# Patient Record
Sex: FEMALE | Race: WHITE | Employment: OTHER | URBAN - METROPOLITAN AREA
[De-identification: names, ages, dates, MRNs, and addresses within clinical notes are randomized per-mention and may not be internally consistent; named-entity substitution may affect disease eponyms.]

---

## 2017-01-01 ENCOUNTER — HOSPITAL ENCOUNTER (OUTPATIENT)
Dept: MRI IMAGING | Age: 67
Discharge: HOME | End: 2017-08-03
Attending: NEUROLOGICAL SURGERY
Payer: MEDICARE

## 2017-01-01 ENCOUNTER — HOSPITAL ENCOUNTER (OUTPATIENT)
Dept: CT IMAGING | Age: 67
Discharge: HOME | End: 2017-07-18
Attending: NEUROLOGICAL SURGERY

## 2017-01-01 ENCOUNTER — OFFICE VISIT (OUTPATIENT)
Dept: FAMILY MEDICINE CLINIC | Age: 67
End: 2017-01-01

## 2017-01-01 ENCOUNTER — APPOINTMENT (OUTPATIENT)
Dept: CT IMAGING | Age: 67
End: 2017-01-01
Attending: NURSE PRACTITIONER
Payer: MEDICARE

## 2017-01-01 ENCOUNTER — TELEPHONE (OUTPATIENT)
Dept: FAMILY MEDICINE CLINIC | Age: 67
End: 2017-01-01

## 2017-01-01 ENCOUNTER — HOSPITAL ENCOUNTER (EMERGENCY)
Age: 67
Discharge: HOME | End: 2017-08-19
Attending: EMERGENCY MEDICINE | Admitting: EMERGENCY MEDICINE
Payer: MEDICARE

## 2017-01-01 ENCOUNTER — APPOINTMENT (OUTPATIENT)
Dept: GENERAL RADIOLOGY | Age: 67
End: 2017-01-01
Attending: NURSE PRACTITIONER
Payer: MEDICARE

## 2017-01-01 VITALS
HEART RATE: 69 BPM | DIASTOLIC BLOOD PRESSURE: 62 MMHG | OXYGEN SATURATION: 94 % | BODY MASS INDEX: 39.85 KG/M2 | WEIGHT: 203 LBS | HEIGHT: 60 IN | RESPIRATION RATE: 16 BRPM | SYSTOLIC BLOOD PRESSURE: 130 MMHG | TEMPERATURE: 97.1 F

## 2017-01-01 VITALS
WEIGHT: 200 LBS | OXYGEN SATURATION: 97 % | DIASTOLIC BLOOD PRESSURE: 71 MMHG | SYSTOLIC BLOOD PRESSURE: 139 MMHG | RESPIRATION RATE: 17 BRPM | TEMPERATURE: 98.4 F | HEART RATE: 59 BPM | BODY MASS INDEX: 39.27 KG/M2 | HEIGHT: 60 IN

## 2017-01-01 VITALS
WEIGHT: 208.6 LBS | RESPIRATION RATE: 16 BRPM | OXYGEN SATURATION: 95 % | DIASTOLIC BLOOD PRESSURE: 72 MMHG | HEIGHT: 62 IN | BODY MASS INDEX: 38.39 KG/M2 | TEMPERATURE: 98 F | HEART RATE: 56 BPM | SYSTOLIC BLOOD PRESSURE: 136 MMHG

## 2017-01-01 DIAGNOSIS — F02.80 EARLY ONSET ALZHEIMER'S DEMENTIA WITHOUT BEHAVIORAL DISTURBANCE (HCC): ICD-10-CM

## 2017-01-01 DIAGNOSIS — Z79.4 TYPE 2 DIABETES MELLITUS WITH HYPERGLYCEMIA, WITH LONG-TERM CURRENT USE OF INSULIN (HCC): Primary | ICD-10-CM

## 2017-01-01 DIAGNOSIS — I10 ESSENTIAL HYPERTENSION WITH GOAL BLOOD PRESSURE LESS THAN 140/90: ICD-10-CM

## 2017-01-01 DIAGNOSIS — E11.65 HYPERGLYCEMIA DUE TO TYPE 2 DIABETES MELLITUS (HCC): ICD-10-CM

## 2017-01-01 DIAGNOSIS — Z12.11 COLON CANCER SCREENING: ICD-10-CM

## 2017-01-01 DIAGNOSIS — I72.9 ANEURYSM OF UNSPECIFIED SITE (HCC): ICD-10-CM

## 2017-01-01 DIAGNOSIS — E08.42 DIABETIC POLYNEUROPATHY ASSOCIATED WITH DIABETES MELLITUS DUE TO UNDERLYING CONDITION (HCC): ICD-10-CM
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
## 2017-01-01 DIAGNOSIS — Z11.59 NEED FOR HEPATITIS C SCREENING TEST: ICD-10-CM

## 2017-01-01 DIAGNOSIS — F33.1 MODERATE EPISODE OF RECURRENT MAJOR DEPRESSIVE DISORDER (HCC): ICD-10-CM

## 2017-01-01 DIAGNOSIS — R56.9 SEIZURE (HCC): Primary | ICD-10-CM

## 2017-01-01 DIAGNOSIS — R56.9 SEIZURE (HCC): ICD-10-CM

## 2017-01-01 DIAGNOSIS — G30.0 EARLY ONSET ALZHEIMER'S DEMENTIA WITHOUT BEHAVIORAL DISTURBANCE (HCC): ICD-10-CM

## 2017-01-01 DIAGNOSIS — E55.9 VITAMIN D DEFICIENCY: ICD-10-CM

## 2017-01-01 DIAGNOSIS — I72.9 ANEURYSM (HCC): ICD-10-CM

## 2017-01-01 DIAGNOSIS — E11.65 TYPE 2 DIABETES MELLITUS WITH HYPERGLYCEMIA, WITH LONG-TERM CURRENT USE OF INSULIN (HCC): Primary | ICD-10-CM

## 2017-01-01 DIAGNOSIS — I73.9 PERIPHERAL ARTERY DISEASE (HCC): ICD-10-CM

## 2017-01-01 DIAGNOSIS — S00.83XA TRAUMATIC HEMATOMA OF FOREHEAD, INITIAL ENCOUNTER: Primary | ICD-10-CM

## 2017-01-01 DIAGNOSIS — Z23 NEEDS FLU SHOT: ICD-10-CM

## 2017-01-01 DIAGNOSIS — S00.81XA FOREHEAD ABRASION, INITIAL ENCOUNTER: ICD-10-CM

## 2017-01-01 DIAGNOSIS — Z23 ENCOUNTER FOR IMMUNIZATION: ICD-10-CM

## 2017-01-01 LAB
ANION GAP SERPL CALC-SCNC: 5 MMOL/L (ref 3–18)
ATRIAL RATE: 54 BPM
BASOPHILS # BLD: 0 K/UL (ref 0–0.06)
BASOPHILS NFR BLD: 1 % (ref 0–2)
BUN SERPL-MCNC: 25 MG/DL (ref 7–18)
BUN/CREAT SERPL: 28 (ref 12–20)
CALCIUM SERPL-MCNC: 9.3 MG/DL (ref 8.5–10.1)
CALCULATED P AXIS, ECG09: 64 DEGREES
CALCULATED R AXIS, ECG10: -5 DEGREES
CALCULATED T AXIS, ECG11: 69 DEGREES
CHLORIDE SERPL-SCNC: 109 MMOL/L (ref 100–108)
CO2 SERPL-SCNC: 27 MMOL/L (ref 21–32)
CREAT SERPL-MCNC: 0.89 MG/DL (ref 0.6–1.3)
DIAGNOSIS, 93000: NORMAL
DIFFERENTIAL METHOD BLD: ABNORMAL
EOSINOPHIL # BLD: 0.1 K/UL (ref 0–0.4)
EOSINOPHIL NFR BLD: 1 % (ref 0–5)
ERYTHROCYTE [DISTWIDTH] IN BLOOD BY AUTOMATED COUNT: 13.4 % (ref 11.6–14.5)
GLUCOSE SERPL-MCNC: 140 MG/DL (ref 74–99)
HBA1C MFR BLD HPLC: 7.1 %
HCT VFR BLD AUTO: 45.7 % (ref 35–45)
HEMOCCULT STL QL IA: NEGATIVE
HGB BLD-MCNC: 15.2 G/DL (ref 12–16)
LEVETIRACETAM SERPL-MCNC: 12.4 UG/ML (ref 10–40)
LYMPHOCYTES # BLD: 1.8 K/UL (ref 0.9–3.6)
LYMPHOCYTES NFR BLD: 21 % (ref 21–52)
MCH RBC QN AUTO: 29.1 PG (ref 24–34)
MCHC RBC AUTO-ENTMCNC: 33.3 G/DL (ref 31–37)
MCV RBC AUTO: 87.5 FL (ref 74–97)
MONOCYTES # BLD: 0.6 K/UL (ref 0.05–1.2)
MONOCYTES NFR BLD: 6 % (ref 3–10)
NEUTS SEG # BLD: 6.3 K/UL (ref 1.8–8)
NEUTS SEG NFR BLD: 71 % (ref 40–73)
P-R INTERVAL, ECG05: 172 MS
PLATELET # BLD AUTO: 214 K/UL (ref 135–420)
PMV BLD AUTO: 10.4 FL (ref 9.2–11.8)
POTASSIUM SERPL-SCNC: 4.9 MMOL/L (ref 3.5–5.5)
Q-T INTERVAL, ECG07: 468 MS
QRS DURATION, ECG06: 88 MS
QTC CALCULATION (BEZET), ECG08: 443 MS
RBC # BLD AUTO: 5.22 M/UL (ref 4.2–5.3)
SODIUM SERPL-SCNC: 141 MMOL/L (ref 136–145)
VENTRICULAR RATE, ECG03: 54 BPM
WBC # BLD AUTO: 8.8 K/UL (ref 4.6–13.2)

## 2017-01-01 PROCEDURE — 73590 X-RAY EXAM OF LOWER LEG: CPT

## 2017-01-01 PROCEDURE — 80048 BASIC METABOLIC PNL TOTAL CA: CPT | Performed by: NURSE PRACTITIONER

## 2017-01-01 PROCEDURE — 70544 MR ANGIOGRAPHY HEAD W/O DYE: CPT

## 2017-01-01 PROCEDURE — 70450 CT HEAD/BRAIN W/O DYE: CPT

## 2017-01-01 PROCEDURE — 80177 DRUG SCRN QUAN LEVETIRACETAM: CPT | Performed by: NURSE PRACTITIONER

## 2017-01-01 PROCEDURE — 85025 COMPLETE CBC W/AUTO DIFF WBC: CPT | Performed by: NURSE PRACTITIONER

## 2017-01-01 PROCEDURE — 74011250637 HC RX REV CODE- 250/637: Performed by: NURSE PRACTITIONER

## 2017-01-01 PROCEDURE — 71020 XR CHEST PA LAT: CPT

## 2017-01-01 PROCEDURE — 99283 EMERGENCY DEPT VISIT LOW MDM: CPT

## 2017-01-01 PROCEDURE — 93005 ELECTROCARDIOGRAM TRACING: CPT

## 2017-01-01 RX ORDER — ASPIRIN 81 MG/1
81 TABLET ORAL DAILY
Qty: 90 TAB | Refills: 1 | Status: SHIPPED | OUTPATIENT
Start: 2017-01-01 | End: 2018-01-01

## 2017-01-01 RX ORDER — DILTIAZEM HYDROCHLORIDE 180 MG/1
180 CAPSULE, EXTENDED RELEASE ORAL DAILY
Qty: 90 CAP | Refills: 0 | Status: SHIPPED | OUTPATIENT
Start: 2017-01-01 | End: 2017-01-01 | Stop reason: SDUPTHER

## 2017-01-01 RX ORDER — LEVETIRACETAM 750 MG/1
TABLET, EXTENDED RELEASE ORAL
Qty: 90 TAB | Refills: 1 | Status: SHIPPED | OUTPATIENT
Start: 2017-01-01

## 2017-01-01 RX ORDER — ACETAMINOPHEN 500 MG
1000 TABLET ORAL
Status: COMPLETED | OUTPATIENT
Start: 2017-01-01 | End: 2017-01-01

## 2017-01-01 RX ORDER — ENALAPRIL MALEATE 20 MG/1
20 TABLET ORAL DAILY
Qty: 90 TAB | Refills: 3 | Status: SHIPPED | OUTPATIENT
Start: 2017-01-01

## 2017-01-01 RX ORDER — DILTIAZEM HYDROCHLORIDE 180 MG/1
CAPSULE, EXTENDED RELEASE ORAL
Qty: 90 CAP | Refills: 0 | Status: SHIPPED | OUTPATIENT
Start: 2017-01-01

## 2017-01-01 RX ORDER — DONEPEZIL HYDROCHLORIDE 10 MG/1
10 TABLET, FILM COATED ORAL
COMMUNITY
End: 2018-01-01 | Stop reason: SDUPTHER

## 2017-01-01 RX ORDER — LEVETIRACETAM 750 MG/1
TABLET, EXTENDED RELEASE ORAL
COMMUNITY
Start: 2017-01-01 | End: 2017-01-01 | Stop reason: SDUPTHER

## 2017-01-01 RX ORDER — INSULIN ASPART 100 [IU]/ML
INJECTION, SOLUTION INTRAVENOUS; SUBCUTANEOUS
Qty: 45 ML | Refills: 1 | Status: SHIPPED | OUTPATIENT
Start: 2017-01-01

## 2017-01-01 RX ORDER — CITALOPRAM 20 MG/1
20 TABLET, FILM COATED ORAL DAILY
Qty: 30 TAB | Refills: 11 | Status: SHIPPED | OUTPATIENT
Start: 2017-01-01

## 2017-01-01 RX ORDER — ERGOCALCIFEROL 1.25 MG/1
50000 CAPSULE ORAL
Qty: 12 CAP | Refills: 3 | Status: SHIPPED | OUTPATIENT
Start: 2017-01-01 | End: 2018-01-01 | Stop reason: SDUPTHER

## 2017-01-01 RX ORDER — LEVETIRACETAM 750 MG/1
TABLET, EXTENDED RELEASE ORAL
Qty: 90 TAB | Refills: 1 | Status: SHIPPED | OUTPATIENT
Start: 2017-01-01 | End: 2017-01-01 | Stop reason: SDUPTHER

## 2017-01-01 RX ADMIN — ACETAMINOPHEN 1000 MG: 500 TABLET ORAL at 08:35

## 2017-01-13 ENCOUNTER — OFFICE VISIT (OUTPATIENT)
Dept: FAMILY MEDICINE CLINIC | Age: 67
End: 2017-01-13

## 2017-01-13 VITALS
WEIGHT: 209 LBS | OXYGEN SATURATION: 90 % | HEIGHT: 62 IN | SYSTOLIC BLOOD PRESSURE: 128 MMHG | HEART RATE: 71 BPM | BODY MASS INDEX: 38.46 KG/M2 | DIASTOLIC BLOOD PRESSURE: 82 MMHG | RESPIRATION RATE: 18 BRPM | TEMPERATURE: 98 F

## 2017-01-13 DIAGNOSIS — R53.81 PHYSICAL DECONDITIONING: ICD-10-CM

## 2017-01-13 DIAGNOSIS — E66.9 OBESITY (BMI 30-39.9): ICD-10-CM

## 2017-01-13 DIAGNOSIS — Z79.4 TYPE 2 DIABETES MELLITUS WITH HYPERGLYCEMIA, WITH LONG-TERM CURRENT USE OF INSULIN (HCC): Primary | ICD-10-CM

## 2017-01-13 DIAGNOSIS — E11.65 TYPE 2 DIABETES MELLITUS WITH HYPERGLYCEMIA, WITH LONG-TERM CURRENT USE OF INSULIN (HCC): Primary | ICD-10-CM

## 2017-01-13 LAB — HBA1C MFR BLD HPLC: 8.4 %

## 2017-01-13 NOTE — PROGRESS NOTES
Kyle Burroughs is a 78 y.o. female presents today with her daughter Macrina Little for annual wellness visit.

## 2017-01-13 NOTE — MR AVS SNAPSHOT
Visit Information Date & Time Provider Department Dept. Phone Encounter #  
 1/13/2017  9:00 AM Heidi Gamboa, [facility] [phone] [id number] Follow-up Instructions Return in about 3 months (around 4/13/2017) for diabetes. Upcoming Health Maintenance Date Due Hepatitis C Screening 1950 COLONOSCOPY 10/3/1967 ZOSTER VACCINE AGE 60> 10/3/2009 MICROALBUMIN Q1 2/19/2017 LIPID PANEL Q1 2/19/2017 MEDICARE YEARLY EXAM 3/19/2017 HEMOGLOBIN A1C Q6M 3/30/2017 FOOT EXAM Q1 4/7/2017 EYE EXAM RETINAL OR DILATED Q1 7/20/2017 BREAST CANCER SCRN MAMMOGRAM 3/30/2018 GLAUCOMA SCREENING Q2Y 7/20/2018 DTaP/Tdap/Td series (2 - Td) 7/6/2026 Allergies as of 1/13/2017  Review Complete On: 1/13/2017 By: Heidi Gamboa MD  
  
 Severity Noted Reaction Type Reactions Latex, Natural Rubber  07/30/2014    Other (comments) Swelling in hands and lips Iodine High 01/23/2014    Anaphylaxis, Swelling Current Immunizations  Reviewed on 9/30/2016 Name Date Influenza High Dose Vaccine PF 9/30/2016 10:03 AM  
 Pneumococcal Conjugate (PCV-13) 9/30/2016 10:03 AM  
  
 Not reviewed this visit You Were Diagnosed With   
  
 Codes Comments Type 2 diabetes mellitus with hyperglycemia, with long-term current use of insulin (HCC)    -  Primary ICD-10-CM: E11.65, Z79.4 ICD-9-CM: 250.00, 790.29, V58.67 Obesity (BMI 30-39. 9)     ICD-10-CM: E66.9 ICD-9-CM: 278.00 Physical deconditioning     ICD-10-CM: R53.81 ICD-9-CM: 799.3 Vitals BP Pulse Temp Resp Height(growth percentile) Weight(growth percentile) 128/82 (BP 1 Location: Left arm, BP Patient Position: Sitting) 71 98 °F (36.7 °C) (Oral) 18 5' 2\" (1.575 m) 209 lb (94.8 kg) SpO2 BMI OB Status Smoking Status 90% 38.23 kg/m2 Hysterectomy Former Smoker BMI and BSA Data  Body Mass Index Body Surface Area  
 38.23 kg/m 2 2.04 m 2  
  
  
 Preferred Pharmacy Pharmacy Name Phone [facility] [address] [place] [address] [address] [phone] Your Updated Medication List  
  
   
This list is accurate as of: 1/13/17  9:10 AM.  Always use your most recent med list. ADVIL PM PO Take  by mouth.  
  
 canagliflozin 100 mg tablet Commonly known as:  Terisa Millet Take 1 Tab by mouth Daily (before breakfast). citalopram 20 mg tablet Commonly known as:  Anu Letters Take 1 Tab by mouth daily. Indications: MAJOR DEPRESSIVE DISORDER  
  
 dilTIAZem  mg XR capsule Commonly known as:  DILACOR XR Take 1 Cap by mouth daily. DUREZOL 0.05 % ophthalmic emulsion Generic drug:  Difluprednate  
  
 enalapril 20 mg tablet Commonly known as:  Ava Sensor Take 1 Tab by mouth daily. ergocalciferol 50,000 unit capsule Commonly known as:  ERGOCALCIFEROL Take 1 Cap by mouth every seven (7) days. ibuprofen 200 mg tablet Commonly known as:  MOTRIN Take 2 Tabs by mouth every eight (8) hours as needed for Pain. Indications: PAIN  
  
 ILEVRO 0.3 % Drps Generic drug:  nepafenac  
  
 insulin detemir 100 unit/mL (3 mL) Inpn Commonly known as:  Zoey Quinterosks Give 40 units at bedtime under the skin  Indications: TYPE 2 DIABETES MELLITUS NovoLOG Flexpen 100 unit/mL Inpn Generic drug:  insulin aspart INJECT  30 UNITS SUBCUTANEOUSLY IN THE MORNING  AND  20 UNITS AT NIGHT. INDICATIONS: DIABETES MELLITUS  
  
 ondansetron 4 mg disintegrating tablet Commonly known as:  ZOFRAN ODT Take 1 Tab by mouth every eight (8) hours as needed for Nausea. Follow-up Instructions Return in about 3 months (around 4/13/2017) for diabetes. Introducing [facility]! Dear Dianne Hartley: Thank you for requesting a [employer] account. Our records indicate that you already have an active [employer] account. You can access your account anytime at [email or web address] Did you know that you can access your hospital and ER discharge instructions at any time in [employer]? You can also review all of your test results from your hospital stay or ER visit. Additional Information If you have questions, please visit the Frequently Asked Questions section of the [employer] website at [email or web address] Remember, [employer] is NOT to be used for urgent needs. For medical emergencies, dial 911. Now available from your iPhone and Android! Please provide this summary of care documentation to your next provider. Your primary care clinician is listed as Román Abbott. If you have any questions after today's visit, please call [phone].

## 2017-01-13 NOTE — PROGRESS NOTES
Linda Reilly is a 78 y.o.  female and presents with    Chief Complaint   Patient presents with    Diabetes    Fatigue       Subjective:  Mrs. Mitchell Espinoza presents for f/u diabetes. She reports that walking into the office she had to rest 3 times. Her daughter reports that she is not exercising at home. Diabetes Mellitus:  She has diabetes mellitus, and  hypertension and obesity. Diabetic ROS - medication compliance: compliant all of the time, diabetic diet compliance: noncompliant some of the time, home glucose monitoring: is performed regularly, minimum reading is 90, maximum reading is 190, and average reading is 160. Lab review: orders written for new lab studies as appropriate; see orders. Patient Active Problem List   Diagnosis Code    DM (diabetes mellitus) ([facility] E11.9    Essential hypertension, benign I10    Morbid obesity ([facility] E66.01    Lymphoma in remission Z85.72    Advance care planning Z71.89     Patient Active Problem List    Diagnosis Date Noted    Advance care planning 07/06/2016    DM (diabetes mellitus) ([facility] 01/23/2014    Essential hypertension, benign 01/23/2014    Morbid obesity ([facility] 01/23/2014    Lymphoma in remission 01/23/2014     Current Outpatient Prescriptions   Medication Sig Dispense Refill    citalopram (CELEXA) 20 mg tablet Take 1 Tab by mouth daily. Indications: MAJOR DEPRESSIVE DISORDER 30 Tab 11    canagliflozin (INVOKANA) 100 mg tablet Take 1 Tab by mouth Daily (before breakfast). 30 Tab 11    NOVOLOG FLEXPEN 100 unit/mL inpn INJECT  30 UNITS SUBCUTANEOUSLY IN THE MORNING  AND  20 UNITS AT NIGHT. INDICATIONS: DIABETES MELLITUS 45 mL 1    DUREZOL 0.05 % ophthalmic emulsion       ILEVRO 0.3 % drps       IBUPROFEN/DIPHENHYDRAMINE CIT (ADVIL PM PO) Take  by mouth.  ondansetron (ZOFRAN ODT) 4 mg disintegrating tablet Take 1 Tab by mouth every eight (8) hours as needed for Nausea.  20 Tab 0    enalapril (VASOTEC) 20 mg tablet Take 1 Tab by mouth daily. 90 Tab 3    diltiazem XR (DILACOR XR) 180 mg XR capsule Take 1 Cap by mouth daily. 90 Cap 3    ibuprofen (MOTRIN) 200 mg tablet Take 2 Tabs by mouth every eight (8) hours as needed for Pain. Indications: PAIN 30 Tab 0    insulin detemir (LEVEMIR FLEXPEN) 100 unit/mL (3 mL) inpn Give 40 units at bedtime under the skin  Indications: TYPE 2 DIABETES MELLITUS 15 Pen 1    ergocalciferol (ERGOCALCIFEROL) 50,000 unit capsule Take 1 Cap by mouth every seven (7) days. 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 No pertinent family history. Social History   Substance Use Topics    Smoking status: Former Smoker    Smokeless tobacco: Never Used    Alcohol use No       ROS   General ROS: negative for - chills or fever  Ophthalmic ROS: negative for - decreased vision  Endocrine ROS: negative for - polydipsia/polyuria or temperature intolerance  Respiratory ROS: no cough, shortness of breath, or wheezing  Cardiovascular ROS: no chest pain or dyspnea on exertion  Gastrointestinal ROS: no abdominal pain, change in bowel habits, or black or bloody stools  Musculoskeletal ROS: negative for - joint pain or muscle pain  Neurological ROS: no TIA or stroke symptoms; using a 4 wheel walker for ambulation  Dermatological ROS: negative for - rash or skin lesion changes    All other systems reviewed and are negative.       Objective:  Vitals:    01/13/17 0850   BP: 128/82   Pulse: 71   Resp: 18   Temp: 98 °F (36.7 °C)   TempSrc: Oral   SpO2: 90%   Weight: 209 lb (94.8 kg)   Height: 5' 2\" (1.575 m)   PainSc:   0 - No pain alert, well appearing, and in no distress, oriented to person, place, and time and obese  Mental status - normal mood, behavior, speech, dress, motor activity, and thought processes  Chest - clear to auscultation, no wheezes, rales or rhonchi, symmetric air entry  Heart - normal rate, regular rhythm, normal S1, S2, no murmurs, rubs, clicks or gallops  Neurological - normal muscle tone, no tremors, strength 5/5, guarded gait; using walker without difficulty    LABS   hgb a1C 8.4    Assessment/Plan:    1. Type 2 diabetes mellitus with hyperglycemia, with long-term current use of insulin ([facility])  Goal Hgb a1C < 7; pt instructed to increase activity; daughter will enroll in [facility] with exercise classes    2. Obesity (BMI 30-39. 9)  I have reviewed/discussed the above normal BMI with the patient. I have recommended the following interventions: dietary management education, guidance, and counseling, dietary needs education and encourage exercise . The plan is as follows: I have counseled this patient on diet and exercise regimens. .        3. Physical deconditioning  Pt to begin supervised exercise      Lab review: labs reviewed, I note that glycosylated hemoglobin abnormal high      I have discussed the diagnosis with the patient and the intended plan as seen in the above orders. The patient has received an after-visit summary and questions were answered concerning future plans. I have discussed medication side effects and warnings with the patient as well. I have reviewed the plan of care with the patient, accepted their input and they are in agreement with the treatment goals. Follow-up Disposition:  Return in about 3 months (around 4/13/2017) for diabetes.

## 2017-03-31 ENCOUNTER — HOSPITAL ENCOUNTER (OUTPATIENT)
Dept: MAMMOGRAPHY | Age: 67
Discharge: HOME | End: 2017-03-31
Attending: FAMILY MEDICINE
Payer: MEDICARE

## 2017-03-31 DIAGNOSIS — Z12.31 VISIT FOR SCREENING MAMMOGRAM: ICD-10-CM

## 2017-03-31 PROCEDURE — 77067 SCR MAMMO BI INCL CAD: CPT

## 2017-04-14 ENCOUNTER — OFFICE VISIT (OUTPATIENT)
Dept: FAMILY MEDICINE CLINIC | Age: 67
End: 2017-04-14

## 2017-04-14 ENCOUNTER — HOSPITAL ENCOUNTER (EMERGENCY)
Age: 67
Discharge: HOME | End: 2017-04-14
Attending: EMERGENCY MEDICINE
Payer: MEDICARE

## 2017-04-14 ENCOUNTER — APPOINTMENT (OUTPATIENT)
Dept: GENERAL RADIOLOGY | Age: 67
End: 2017-04-14
Attending: EMERGENCY MEDICINE
Payer: MEDICARE

## 2017-04-14 ENCOUNTER — APPOINTMENT (OUTPATIENT)
Dept: CT IMAGING | Age: 67
End: 2017-04-14
Attending: EMERGENCY MEDICINE
Payer: MEDICARE

## 2017-04-14 VITALS
OXYGEN SATURATION: 98 % | HEART RATE: 49 BPM | DIASTOLIC BLOOD PRESSURE: 52 MMHG | SYSTOLIC BLOOD PRESSURE: 98 MMHG | BODY MASS INDEX: 38.64 KG/M2 | RESPIRATION RATE: 17 BRPM | TEMPERATURE: 97.9 F | WEIGHT: 210 LBS | HEIGHT: 62 IN

## 2017-04-14 VITALS
HEART RATE: 68 BPM | DIASTOLIC BLOOD PRESSURE: 67 MMHG | RESPIRATION RATE: 18 BRPM | SYSTOLIC BLOOD PRESSURE: 166 MMHG | OXYGEN SATURATION: 90 %

## 2017-04-14 DIAGNOSIS — N30.00 ACUTE CYSTITIS WITHOUT HEMATURIA: ICD-10-CM

## 2017-04-14 DIAGNOSIS — Z00.00 MEDICARE ANNUAL WELLNESS VISIT, SUBSEQUENT: ICD-10-CM

## 2017-04-14 DIAGNOSIS — R56.9 SEIZURE (HCC): Primary | ICD-10-CM

## 2017-04-14 DIAGNOSIS — R55 SYNCOPE AND COLLAPSE: Primary | ICD-10-CM

## 2017-04-14 DIAGNOSIS — Z79.4 TYPE 2 DIABETES MELLITUS WITH HYPERGLYCEMIA, WITH LONG-TERM CURRENT USE OF INSULIN (HCC): ICD-10-CM

## 2017-04-14 DIAGNOSIS — E66.9 OBESITY (BMI 30-39.9): ICD-10-CM

## 2017-04-14 DIAGNOSIS — R11.2 NAUSEA AND VOMITING, INTRACTABILITY OF VOMITING NOT SPECIFIED, UNSPECIFIED VOMITING TYPE: ICD-10-CM

## 2017-04-14 DIAGNOSIS — E11.65 TYPE 2 DIABETES MELLITUS WITH HYPERGLYCEMIA, WITH LONG-TERM CURRENT USE OF INSULIN (HCC): ICD-10-CM

## 2017-04-14 DIAGNOSIS — Z71.89 ADVANCE CARE PLANNING: ICD-10-CM

## 2017-04-14 DIAGNOSIS — I10 ESSENTIAL HYPERTENSION: ICD-10-CM

## 2017-04-14 LAB
ANION GAP BLD CALC-SCNC: 6 MMOL/L (ref 3–18)
APPEARANCE UR: ABNORMAL
BACTERIA URNS QL MICRO: ABNORMAL /HPF
BASOPHILS # BLD AUTO: 0 K/UL (ref 0–0.06)
BASOPHILS # BLD: 0 % (ref 0–2)
BILIRUB UR QL: NEGATIVE
BUN SERPL-MCNC: 24 MG/DL (ref 7–18)
BUN/CREAT SERPL: 23 (ref 12–20)
CALCIUM SERPL-MCNC: 9.6 MG/DL (ref 8.5–10.1)
CHLORIDE SERPL-SCNC: 105 MMOL/L (ref 100–108)
CK MB CFR SERPL CALC: NORMAL % (ref 0–4)
CK MB SERPL-MCNC: <1 NG/ML (ref 5–25)
CK SERPL-CCNC: 50 U/L (ref 26–192)
CO2 SERPL-SCNC: 31 MMOL/L (ref 21–32)
COLOR UR: YELLOW
CREAT SERPL-MCNC: 1.05 MG/DL (ref 0.6–1.3)
DIFFERENTIAL METHOD BLD: ABNORMAL
EOSINOPHIL # BLD: 0.1 K/UL (ref 0–0.4)
EOSINOPHIL NFR BLD: 0 % (ref 0–5)
EPITH CASTS URNS QL MICRO: ABNORMAL /LPF (ref 0–5)
ERYTHROCYTE [DISTWIDTH] IN BLOOD BY AUTOMATED COUNT: 13.2 % (ref 11.6–14.5)
GLUCOSE POC: 133 MG/DL
GLUCOSE SERPL-MCNC: 95 MG/DL (ref 74–99)
GLUCOSE UR STRIP.AUTO-MCNC: >1000 MG/DL
HBA1C MFR BLD HPLC: 8.3 %
HCT VFR BLD AUTO: 43.7 % (ref 35–45)
HGB BLD-MCNC: 14.5 G/DL (ref 12–16)
HGB UR QL STRIP: NEGATIVE
KETONES UR QL STRIP.AUTO: 40 MG/DL
LEUKOCYTE ESTERASE UR QL STRIP.AUTO: NEGATIVE
LYMPHOCYTES # BLD AUTO: 10 % (ref 21–52)
LYMPHOCYTES # BLD: 1.2 K/UL (ref 0.9–3.6)
MCH RBC QN AUTO: 29 PG (ref 24–34)
MCHC RBC AUTO-ENTMCNC: 33.2 G/DL (ref 31–37)
MCV RBC AUTO: 87.4 FL (ref 74–97)
MONOCYTES # BLD: 0.7 K/UL (ref 0.05–1.2)
MONOCYTES NFR BLD AUTO: 5 % (ref 3–10)
NEUTS SEG # BLD: 10 K/UL (ref 1.8–8)
NEUTS SEG NFR BLD AUTO: 85 % (ref 40–73)
NITRITE UR QL STRIP.AUTO: POSITIVE
PH UR STRIP: 5 [PH] (ref 5–8)
PLATELET # BLD AUTO: 172 K/UL (ref 135–420)
PMV BLD AUTO: 11.8 FL (ref 9.2–11.8)
POTASSIUM SERPL-SCNC: 4.1 MMOL/L (ref 3.5–5.5)
PROT UR STRIP-MCNC: NEGATIVE MG/DL
RBC # BLD AUTO: 5 M/UL (ref 4.2–5.3)
RBC #/AREA URNS HPF: 0 /HPF (ref 0–5)
SODIUM SERPL-SCNC: 142 MMOL/L (ref 136–145)
SP GR UR REFRACTOMETRY: >1.03 (ref 1–1.03)
TROPONIN I SERPL-MCNC: <0.02 NG/ML (ref 0–0.04)
TROPONIN I SERPL-MCNC: <0.02 NG/ML (ref 0–0.04)
UROBILINOGEN UR QL STRIP.AUTO: 0.2 EU/DL (ref 0.2–1)
WBC # BLD AUTO: 12 K/UL (ref 4.6–13.2)
WBC URNS QL MICRO: ABNORMAL /HPF (ref 0–4)

## 2017-04-14 PROCEDURE — 99285 EMERGENCY DEPT VISIT HI MDM: CPT

## 2017-04-14 PROCEDURE — 81001 URINALYSIS AUTO W/SCOPE: CPT | Performed by: EMERGENCY MEDICINE

## 2017-04-14 PROCEDURE — 82550 ASSAY OF CK (CPK): CPT | Performed by: EMERGENCY MEDICINE

## 2017-04-14 PROCEDURE — 87086 URINE CULTURE/COLONY COUNT: CPT | Performed by: EMERGENCY MEDICINE

## 2017-04-14 PROCEDURE — 87186 SC STD MICRODIL/AGAR DIL: CPT | Performed by: EMERGENCY MEDICINE

## 2017-04-14 PROCEDURE — 71010 XR CHEST PORT: CPT

## 2017-04-14 PROCEDURE — 70450 CT HEAD/BRAIN W/O DYE: CPT

## 2017-04-14 PROCEDURE — 87077 CULTURE AEROBIC IDENTIFY: CPT | Performed by: EMERGENCY MEDICINE

## 2017-04-14 PROCEDURE — 96361 HYDRATE IV INFUSION ADD-ON: CPT

## 2017-04-14 PROCEDURE — 74011000258 HC RX REV CODE- 258: Performed by: EMERGENCY MEDICINE

## 2017-04-14 PROCEDURE — 74011250636 HC RX REV CODE- 250/636: Performed by: EMERGENCY MEDICINE

## 2017-04-14 PROCEDURE — 80048 BASIC METABOLIC PNL TOTAL CA: CPT | Performed by: EMERGENCY MEDICINE

## 2017-04-14 PROCEDURE — 96365 THER/PROPH/DIAG IV INF INIT: CPT

## 2017-04-14 PROCEDURE — 85025 COMPLETE CBC W/AUTO DIFF WBC: CPT | Performed by: EMERGENCY MEDICINE

## 2017-04-14 PROCEDURE — 93005 ELECTROCARDIOGRAM TRACING: CPT

## 2017-04-14 RX ORDER — CEPHALEXIN 500 MG/1
500 CAPSULE ORAL 4 TIMES DAILY
Qty: 28 CAP | Refills: 0 | Status: SHIPPED | OUTPATIENT
Start: 2017-04-14 | End: 2017-04-21

## 2017-04-14 RX ADMIN — SODIUM CHLORIDE 1000 ML: 900 INJECTION, SOLUTION INTRAVENOUS at 10:50

## 2017-04-14 RX ADMIN — CEFTRIAXONE SODIUM 1 G: 1 INJECTION, POWDER, FOR SOLUTION INTRAMUSCULAR; INTRAVENOUS at 16:07

## 2017-04-14 NOTE — DISCHARGE INSTRUCTIONS
Fainting: Care Instructions  Your Care Instructions    When you faint, or pass out, you lose consciousness for a short time. A brief drop in blood flow to the brain often causes it. When you fall or lie down, more blood flows to your brain and you regain consciousness. Emotional stress, pain, or overheating--especially if you have been standing--can make you faint. In these cases, fainting is usually not serious. But fainting can be a sign of a more serious problem. Your doctor may want you to have more tests to rule out other causes. The treatment you need depends on the reason why you fainted. The doctor has checked you carefully, but problems can develop later. If you notice any problems or new symptoms, get medical treatment right away. Follow-up care is a key part of your treatment and safety. Be sure to make and go to all appointments, and call your doctor if you are having problems. It's also a good idea to know your test results and keep a list of the medicines you take. How can you care for yourself at home? · Drink plenty of fluids to prevent dehydration. If you have kidney, heart, or liver disease and have to limit fluids, talk with your doctor before you increase your fluid intake. When should you call for help? Call 911 anytime you think you may need emergency care. For example, call if:  · You have symptoms of a heart problem. These may include:  ¨ Chest pain or pressure. ¨ Severe trouble breathing. ¨ A fast or irregular heartbeat. ¨ Lightheadedness or sudden weakness. ¨ Coughing up pink, foamy mucus. ¨ Passing out. After you call 911, the  may tell you to chew 1 adult-strength or 2 to 4 low-dose aspirin. Wait for an ambulance. Do not try to drive yourself. · You have symptoms of a stroke. These may include:  ¨ Sudden numbness, tingling, weakness, or loss of movement in your face, arm, or leg, especially on only one side of your body. ¨ Sudden vision changes.   ¨ Sudden trouble speaking. ¨ Sudden confusion or trouble understanding simple statements. ¨ Sudden problems with walking or balance. ¨ A sudden, severe headache that is different from past headaches. · You passed out (lost consciousness) again. Watch closely for changes in your health, and be sure to contact your doctor if:  · You do not get better as expected. Where can you learn more? Go to http://elizabeth-demetris.info/. Enter T331 in the search box to learn more about \"Fainting: Care Instructions. \"  Current as of: May 27, 2016  Content Version: 11.2  © 4136-2450 [employer]. Care instructions adapted under license by [employer] (which disclaims liability or warranty for this information). If you have questions about a medical condition or this instruction, always ask your healthcare professional. [address] any warranty or liability for your use of this information. Lightheadedness or Faintness: Care Instructions  Your Care Instructions  Lightheadedness is a feeling that you are about to faint or \"pass out. \" You do not feel as if you or your surroundings are moving. It is different from vertigo, which is the feeling that you or things around you are spinning or tilting. Lightheadedness usually goes away or gets better when you lie down. If lightheadedness gets worse, it can lead to a fainting spell. It is common to feel lightheaded from time to time. Lightheadedness usually is not caused by a serious problem. It often is caused by a short-lasting drop in blood pressure and blood flow to your head that occurs when you get up too quickly from a seated or lying position. Follow-up care is a key part of your treatment and safety. Be sure to make and go to all appointments, and call your doctor if you are having problems. It's also a good idea to know your test results and keep a list of the medicines you take.   How can you care for yourself at home?  · Lie down for 1 or 2 minutes when you feel lightheaded. After lying down, sit up slowly and remain sitting for 1 to 2 minutes before slowly standing up. · Avoid movements, positions, or activities that have made you lightheaded in the past.  · Get plenty of rest, especially if you have a cold or flu, which can cause lightheadedness. · Make sure you drink plenty of fluids, especially if you have a fever or have been sweating. · Do not drive or put yourself and others in danger while you feel lightheaded. When should you call for help? Call 911 anytime you think you may need emergency care. For example, call if:  · You have symptoms of a stroke. These may include:  ¨ Sudden numbness, tingling, weakness, or loss of movement in your face, arm, or leg, especially on only one side of your body. ¨ Sudden vision changes. ¨ Sudden trouble speaking. ¨ Sudden confusion or trouble understanding simple statements. ¨ Sudden problems with walking or balance. ¨ A sudden, severe headache that is different from past headaches. · You have symptoms of a heart attack. These may include:  ¨ Chest pain or pressure, or a strange feeling in the chest.  ¨ Sweating. ¨ Shortness of breath. ¨ Nausea or vomiting. ¨ Pain, pressure, or a strange feeling in the back, neck, jaw, or upper belly or in one or both shoulders or arms. ¨ Lightheadedness or sudden weakness. ¨ A fast or irregular heartbeat. After you call 911, the  may tell you to chew 1 adult-strength or 2 to 4 low-dose aspirin. Wait for an ambulance. Do not try to drive yourself. Watch closely for changes in your health, and be sure to contact your doctor if:  · Your lightheadedness gets worse or does not get better with home care. Where can you learn more? Go to http://elizabeth-demetris.info/. Enter F800 in the search box to learn more about \"Lightheadedness or Faintness: Care Instructions. \"  Current as of: May 27, 2016  Content Version: 11.2  © 5446-7658 [employer]. Care instructions adapted under license by [employer] (which disclaims liability or warranty for this information). If you have questions about a medical condition or this instruction, always ask your healthcare professional. [address] any warranty or liability for your use of this information.

## 2017-04-14 NOTE — ACP (ADVANCE CARE PLANNING)
Advance Care Planning (ACP) Provider Note - Comprehensive     Date of ACP Conversation: 04/14/17  Persons included in Conversation:  patient and POA  Length of ACP Conversation in minutes:  16 minutes    Authorized Decision Maker (if patient is incapable of making informed decisions): This person is:  her daughter          General ACP for ALL Patients with Decision Making Capacity:   Importance of advance care planning, including choosing a healthcare agent to communicate patient's healthcare decisions if patient lost the ability to make decisions, such as after a sudden illness or accident  Understanding of the healthcare agent role was assessed and information provided  Exploration of values, goals, and preferences if recovery is not expected, even with continued medical treatment in the event of: Imminent death  Severe, permanent brain injury  \"In these circumstances, what matters most to you? \"  Care focused more on comfort or quality of life. \"What, if any, treatments would you want to avoid? \" none at this time  Opportunity offered to explore how cultural, Orthodoxy, spiritual, or personal beliefs would affect decisions for future care     Review of Existing Advance Directive:  What information were you given about medical decisions to consider before completing your advance directive? planning form    For Serious or Chronic Illness:  Understanding of medical condition      Interventions Provided:  Reviewed existing Advance Directive   changes  may come after ER evaluation for seizure and  new findings on brain MRI

## 2017-04-14 NOTE — ED PROVIDER NOTES
HPI Comments: 10:50 AM Jacqueline Mann is a 78 y.o. Female with a hx of HTN, Diabetes, and Depression who presents to the ED via EMS for evaluation of syncope. The pt states that she was visiting Dr. Karla Morris for a check up when she felt \"light-headed\" and passed out. She also states that she vomited one time at her appointment. The pt also states that she has been experiencing lower abdominal pain, which she has had for \"awhile,\" and chest pain when laying down, and that she has been constipated for a couple days. The pt reported that the last time she fainted was two years ago. She denies a fever and urinary sx. No other complaints at this time. Patient is a 78 y.o. female presenting with syncope. The history is provided by the patient. Syncope    Associated symptoms include chest pain (when laying down ), nausea, vomiting and light-headedness. Pertinent negatives include no fever and no back pain. Abdominal pain: lower abdomen  Her past medical history is significant for syncope. 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 No pertinent family history. Social History     Social History    Marital status: SINGLE     Spouse name: N/A    Number of children: N/A    Years of education: N/A     Occupational History    Not on file. Social History Main Topics    Smoking status: Former Smoker    Smokeless tobacco: Never Used    Alcohol use No    Drug use: No    Sexual activity: No     Other Topics Concern    Not on file     Social History Narrative         ALLERGIES: Latex, natural rubber and Iodine    Review of Systems   Constitutional: Negative for fever. HENT: Negative for trouble swallowing.     Respiratory: Negative for shortness of breath. Cardiovascular: Positive for chest pain (when laying down ) and syncope. Gastrointestinal: Positive for constipation, nausea and vomiting. Negative for diarrhea. Abdominal pain: lower abdomen    Genitourinary: Negative. Negative for difficulty urinating. Musculoskeletal: Negative for back pain and neck pain. Skin: Negative for wound. Neurological: Positive for syncope and light-headedness. Psychiatric/Behavioral: Negative for behavioral problems. All other systems reviewed and are negative. Vitals:    04/14/17 1415 04/14/17 1430 04/14/17 1445 04/14/17 1500   BP: (!) 165/145 165/71 (!) 156/106 156/69   Pulse: 65 66 68 72   Resp:       SpO2: 100% 100% 100% 100%            Physical Exam   Constitutional: She is oriented to person, place, and time. She appears well-developed. No distress. Chronically ill-appearing, nad   HENT:   Head: Normocephalic and atraumatic. Eyes: EOM are normal.   Neck: Normal range of motion. Cardiovascular: Normal rate. Pulmonary/Chest: Effort normal and breath sounds normal. No respiratory distress. Abdominal: Soft. There is no tenderness. Musculoskeletal: Normal range of motion. Mechanically stable   Neurological: She is alert and oriented to person, place, and time. No focal deficits noted   Psychiatric: Her behavior is normal.   Nursing note and vitals reviewed. MDM  Number of Diagnoses or Management Options  Acute cystitis without hematuria:   Essential hypertension:   Syncope and collapse:   Diagnosis management comments: 79 yo CF with PMHx HTN, DM presents by EMS after syncopal episode at pcp office. Pt with light-headedness prior to episode, but currently asymptomatic. Examination unremarkable. Consider orthostatic, vasovagal, non-specific. Will evaluate for acute process. 4:10 PM  ua with uti, will culture and treat.   After discussion with pt and family, episode today involved seizure-like activity, had what is described as post-ictal period, and pt does have h/o seizures. Consider uti that lowered seizure threshold. Pt has been at baseline and asymptomatic in ED. Discussed results with pt and family and poc for dc home, abx, symptom management, return precautions.            Amount and/or Complexity of Data Reviewed  Clinical lab tests: ordered and reviewed  Tests in the radiology section of CPT®: ordered and reviewed  Obtain history from someone other than the patient: yes  Review and summarize past medical records: yes  Independent visualization of images, tracings, or specimens: yes    Patient Progress  Patient progress: improved    ED Course       EKG  Date/Time: 4/14/2017 11:19 AM  Performed by: Svetlana Patrick  Authorized by: Svetlana Patrick     ECG reviewed by ED Physician in the absence of a cardiologist: yes    Previous ECG:     Previous ECG:  Compared to current    Comparison ECG info:  1/24/14    Similarity:  No change  Interpretation:     Interpretation: non-specific    Rate:     ECG rate:  56    ECG rate assessment: bradycardic    Rhythm:     Rhythm: sinus bradycardia    Ectopy:     Ectopy: none    QRS:     QRS axis:  Normal    QRS intervals:  Normal  Conduction:     Conduction: normal    ST segments:     ST segments:  Normal  T waves:     T waves: normal            Lab findings:  Recent Results (from the past 12 hour(s))   EKG, 12 LEAD, INITIAL    Collection Time: 04/14/17 10:44 AM   Result Value Ref Range    Ventricular Rate 56 BPM    Atrial Rate 56 BPM    P-R Interval 164 ms    QRS Duration 82 ms    Q-T Interval 454 ms    QTC Calculation (Bezet) 438 ms    Calculated P Axis 57 degrees    Calculated R Axis -9 degrees    Calculated T Axis 87 degrees    Diagnosis       Poor data quality, interpretation may be adversely affected  Sinus bradycardia  Otherwise normal ECG  When compared with ECG of 24-JAN-2014 08:36,  Questionable change in QRS axis     AMB POC HEMOGLOBIN A1C    Collection Time: 04/14/17 12:39 PM   Result Value Ref Range    Hemoglobin A1c (POC) 8.3 %   AMB POC GLUCOSE BLOOD, BY GLUCOSE MONITORING DEVICE    Collection Time: 04/14/17 12:40 PM   Result Value Ref Range    Glucose  mg/dL   CARDIAC PANEL,(CK, CKMB & TROPONIN)    Collection Time: 04/14/17 12:44 PM   Result Value Ref Range    CK 50 26 - 192 U/L    CK - MB <1.0 <3.6 ng/ml    CK-MB Index Cannot be calulated 0.0 - 4.0 %    Troponin-I, Qt. <0.02 0.0 - 0.045 NG/ML   CBC WITH AUTOMATED DIFF    Collection Time: 04/14/17 12:44 PM   Result Value Ref Range    WBC 12.0 4.6 - 13.2 K/uL    RBC 5.00 4.20 - 5.30 M/uL    HGB 14.5 12.0 - 16.0 g/dL    HCT 43.7 35.0 - 45.0 %    MCV 87.4 74.0 - 97.0 FL    MCH 29.0 24.0 - 34.0 PG    MCHC 33.2 31.0 - 37.0 g/dL    RDW 13.2 11.6 - 14.5 %    PLATELET 407 458 - 386 K/uL    MPV 11.8 9.2 - 11.8 FL    NEUTROPHILS 85 (H) 40 - 73 %    LYMPHOCYTES 10 (L) 21 - 52 %    MONOCYTES 5 3 - 10 %    EOSINOPHILS 0 0 - 5 %    BASOPHILS 0 0 - 2 %    ABS. NEUTROPHILS 10.0 (H) 1.8 - 8.0 K/UL    ABS. LYMPHOCYTES 1.2 0.9 - 3.6 K/UL    ABS. MONOCYTES 0.7 0.05 - 1.2 K/UL    ABS. EOSINOPHILS 0.1 0.0 - 0.4 K/UL    ABS.  BASOPHILS 0.0 0.0 - 0.06 K/UL    DF AUTOMATED     METABOLIC PANEL, BASIC    Collection Time: 04/14/17 12:44 PM   Result Value Ref Range    Sodium 142 136 - 145 mmol/L    Potassium 4.1 3.5 - 5.5 mmol/L    Chloride 105 100 - 108 mmol/L    CO2 31 21 - 32 mmol/L    Anion gap 6 3.0 - 18 mmol/L    Glucose 95 74 - 99 mg/dL    BUN 24 (H) 7.0 - 18 MG/DL    Creatinine 1.05 0.6 - 1.3 MG/DL    BUN/Creatinine ratio 23 (H) 12 - 20      GFR est AA >60 >60 ml/min/1.73m2    GFR est non-AA 52 (L) >60 ml/min/1.73m2    Calcium 9.6 8.5 - 10.1 MG/DL   URINALYSIS W/ RFLX MICROSCOPIC    Collection Time: 04/14/17  2:53 PM   Result Value Ref Range    Color YELLOW      Appearance CLOUDY      Specific gravity >1.030 (H) 1.005 - 1.030    pH (UA) 5.0 5.0 - 8.0      Protein NEGATIVE  NEG mg/dL    Glucose >1000 (A) NEG mg/dL    Ketone 40 (A) NEG mg/dL    Bilirubin NEGATIVE  NEG      Blood NEGATIVE  NEG      Urobilinogen 0.2 0.2 - 1.0 EU/dL    Nitrites POSITIVE (A) NEG      Leukocyte Esterase NEGATIVE  NEG     URINE MICROSCOPIC ONLY    Collection Time: 04/14/17  2:53 PM   Result Value Ref Range    WBC 15 to 20 0 - 4 /hpf    RBC 0 0 - 5 /hpf    Epithelial cells FEW 0 - 5 /lpf    Bacteria 4+ (A) NEG /hpf   TROPONIN I    Collection Time: 04/14/17  3:21 PM   Result Value Ref Range    Troponin-I, Qt. <0.02 0.0 - 0.045 NG/ML         X-Ray, CT or other radiology findings or impressions:  CT HEAD WO CONT   Final Result   IMPRESSION:     1. No acute intracranial hemorrhage, mass effect, midline shift, or herniation. No definite CT evidence of acute cortical infarct is seen. Please note that  noncontrast head CT may be normal in early acute infarct.      2. Hypodense cystic-appearing lesion in the anterior left frontal lobe without  current mass effect, instead with adjacent encephalomalacic changes and evidence  of interval volume loss. 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 Of note, a solid appearing, enhancing intra-axial mass was  present in this location on prior 7/12 MR brain study, and underwent needle  biopsy. Questionable tiny hyperdense contralateral right frontal cystic lesion,  not definitive, but also in location of another previously seen enhancing solid  appearing lesion in 2012. These cystic appearing lesions would be an unusual  presentation of cystic residual/recurrent tumor, although not excluded. Follow-up MR brain with gadolinium may be helpful.     3. Relatively confluent bifrontal white matter hypodensity, nonspecific perhaps  representing postradiation changes given prior bifrontal masses.     4. Additional stable, mild nonspecific white matter disease likely representing  chronic small vessel changes. XR CHEST PORT   Final Result   Impression:  No radiographic evidence of an acute abnormality.          Progress notes, Consult notes, additional Procedure notes, and/or Re-evaluation:   4:06 PM I have assessed the patient, who will be discharged in stable condition. I've given the patient precautions to return to the emergency room if there are any new or worsening conditions. I've also instructed the patient to follow up regarding their visit today. Patient has verbalized understanding and agreement with treatment plan, plan to discharge, and follow-up. All questions were answered at this time. Disposition:  Diagnosis:   1. Syncope and collapse    2. Essential hypertension    3. Acute cystitis without hematuria          Disposition: Discharge      Scribe Attestation:   William Stapleton acting as a scribe for and in the presence of Dr. Jaime Diaz MD     Signed by: Spike Mary, April 14, 2017 at 4:13 PM     Signed by: Spike Batista Trainer, April 14, 2017 at 4:13 PM       Provider Attestation:   I personally performed the services described in the documentation, reviewed the documentation, as recorded by the scribe in my presence, and it accurately and completely records my words and actions.      Reviewed and signed by:  Dr. Jaime Diaz MD

## 2017-04-14 NOTE — PROGRESS NOTES
Maida Singh is a 78 y.o.  female and presents with  (AWV) The Medicare Annual Wellness Exam PROGRESS NOTE    This is a John Exam (AWV)    I have reviewed the patient's medical history in detail and updated the computerized patient record. Maida Singh is a 78 y.o.  female and presents for an annual wellness exam       Patient Active Problem List   Diagnosis Code    DM (diabetes mellitus) ([facility] E11.9    Essential hypertension, benign I10    Morbid obesity ([facility] E66.01    Lymphoma in remission Z85.72    Advance care planning Z71.89     Patient Active Problem List    Diagnosis Date Noted    Advance care planning 07/06/2016    DM (diabetes mellitus) ([facility] 01/23/2014    Essential hypertension, benign 01/23/2014    Morbid obesity ([facility] 01/23/2014    Lymphoma in remission 01/23/2014     Current Outpatient Prescriptions   Medication Sig Dispense Refill    NOVOLOG FLEXPEN 100 unit/mL inpn INJECT  30 UNITS SUBCUTANEOUSLY IN THE MORNING  AND  20 UNITS AT NIGHT. INDICATIONS: DIABETES MELLITUS 45 mL 1    DUREZOL 0.05 % ophthalmic emulsion       ILEVRO 0.3 % drps       IBUPROFEN/DIPHENHYDRAMINE CIT (ADVIL PM PO) Take  by mouth.  ondansetron (ZOFRAN ODT) 4 mg disintegrating tablet Take 1 Tab by mouth every eight (8) hours as needed for Nausea. 20 Tab 0    citalopram (CELEXA) 20 mg tablet Take 1 Tab by mouth daily. Indications: MAJOR DEPRESSIVE DISORDER 30 Tab 11    enalapril (VASOTEC) 20 mg tablet Take 1 Tab by mouth daily. 90 Tab 3    diltiazem XR (DILACOR XR) 180 mg XR capsule Take 1 Cap by mouth daily. 90 Cap 3    ibuprofen (MOTRIN) 200 mg tablet Take 2 Tabs by mouth every eight (8) hours as needed for Pain. Indications: PAIN 30 Tab 0    canagliflozin (INVOKANA) 100 mg tablet Take 1 Tab by mouth Daily (before breakfast).  30 Tab 11    insulin detemir (LEVEMIR FLEXPEN) 100 unit/mL (3 mL) inpn Give 40 units at bedtime under the skin  Indications: TYPE 2 DIABETES MELLITUS 15 Pen 1    ergocalciferol (ERGOCALCIFEROL) 50,000 unit capsule Take 1 Cap by mouth every seven (7) days. 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 No pertinent family history. Social History   Substance Use Topics    Smoking status: Former Smoker    Smokeless tobacco: Never Used    Alcohol use No       ROS   General ROS: negative for - chills or fever  Psychological ROS: positive for - irritable  Ophthalmic ROS: negative for - blurry vision  ENT ROS: negative for - headaches or sore throat; + nasal discharge associated with allergies  Endocrine ROS: negative for - polydipsia/polyuria or temperature intolerance  Respiratory ROS: no cough, shortness of breath, or wheezing  Cardiovascular ROS: positive for - chest pain at night when she is recumbent   Gastrointestinal ROS: no abdominal pain or black or bloody stools  positive for - diarrhea for the past 2-3 days  Genito-Urinary ROS: incontinence occasionally  Musculoskeletal ROS: negative for - joint pain or muscle pain  Neurological ROS: no TIA or stroke symptoms  Dermatological ROS: negative for - rash or skin lesion changes    All other systems reviewed and are negative.     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 INDICATIONS: DIABETES MELLITUS 45 mL 1    DUREZOL 0.05 % ophthalmic emulsion       ILEVRO 0.3 % drps       IBUPROFEN/DIPHENHYDRAMINE CIT (ADVIL PM PO) Take  by mouth.  ondansetron (ZOFRAN ODT) 4 mg disintegrating tablet Take 1 Tab by mouth every eight (8) hours as needed for Nausea. 20 Tab 0    citalopram (CELEXA) 20 mg tablet Take 1 Tab by mouth daily. Indications: MAJOR DEPRESSIVE DISORDER 30 Tab 11    enalapril (VASOTEC) 20 mg tablet Take 1 Tab by mouth daily. 90 Tab 3    diltiazem XR (DILACOR XR) 180 mg XR capsule Take 1 Cap by mouth daily. 90 Cap 3    ibuprofen (MOTRIN) 200 mg tablet Take 2 Tabs by mouth every eight (8) hours as needed for Pain. Indications: PAIN 30 Tab 0    canagliflozin (INVOKANA) 100 mg tablet Take 1 Tab by mouth Daily (before breakfast). 30 Tab 11    insulin detemir (LEVEMIR FLEXPEN) 100 unit/mL (3 mL) inpn Give 40 units at bedtime under the skin  Indications: TYPE 2 DIABETES MELLITUS 15 Pen 1    ergocalciferol (ERGOCALCIFEROL) 50,000 unit capsule Take 1 Cap by mouth every seven (7) days. 12 Cap 3     Allergies   Allergen Reactions    Latex, Natural Rubber Other (comments)     Swelling in hands and lips    Iodine Anaphylaxis and Swelling     History reviewed. No pertinent family history.   Social History   Substance Use Topics    Smoking status: Former Smoker    Smokeless tobacco: Never Used    Alcohol use No     Patient Active Problem List   Diagnosis Code    DM (diabetes mellitus) ([facility] [address] E11.9    Essential hypertension, benign I10    Morbid obesity ([facility] [address] E66.01    Lymphoma in remission Z85.72    Advance care planning Z71.89       Health Maintenance History  Immunizations reviewed, dtap due , pneumovax up to date, flu up to date, zoster due  Colonoscopy: up to date,   Eye exam: due  Mammo up to date  Dexascan up to date        Depression Risk Factor Screening:      Patient Health Questionnaire (PHQ-2)   Over the last 2 weeks, how often have you been bothered by any of the following problems? · Little interest or pleasure in doing things? · Not at all. [0]  · Feeling down, depressed, or hopeless? · Not at all. [0]    Total Score: 0/6  PHQ-2 Assessment Scoring:   A score of 2 or more requires further screening with the PHQ-9    Alcohol Risk Factor Screening:     Women: On any occasion during the past 3 months, have you had more than 3 drinks containing alcohol?  no   Do you average more than 7 drinks per week?  no    Functional Ability and Level of Safety:     Hearing Loss    mild    Activities of Daily Living   Self-care. Requires assistance with: no ADLs; her son in law prepares her meals in order to help keep a diabetic diet in place    Fall Risk   Ambulatory aid furniture (30 pts)  Gait weak (10 pts)  Forgets limitations (15 pts)  Score: 55    Abuse Screen   None    Examination   Physical Examination  Vitals:    04/14/17 0922   BP: 124/82   Pulse: 79   Resp: 17   Temp: 98.4 °F (36.9 °C)   TempSrc: Oral   SpO2: 92%   Weight: 210 lb (95.3 kg)   Height: 5' 2\" (1.575 m)   PainSc:   0 - No pain      Body mass index is 38.41 kg/(m^2).      Evaluation of Cognitive Function:  Mood/affect:good mood with appropriate affect initially  Appearance:obese  Family member/caregiver input:she has become irritable; she was seen by Dr. Stephan Fink in oncology and Dr. Dillan Crum in neurosurgery after a new lesion was found on her brain suspicious of lymphoma    ill-appearing    Patient Care Team:  Baldemar Patrick MD as PCP - General (Family Practice)    End-of-life planning  Advanced Directive in the case than an injury or illness causes the patient to be unable to make health care decisions    Health Care Directive or Living Will: yes    Advice/Referrals/Counselling/Plan:   Education and counseling provided:  End-of-Life planning (with patient's consent)  Screening Mammography  Bone mass measurement (DEXA)  Medical nutrition therapy for individuals with diabetes or renal disease  weight loss, physical activity, fall prevention, and nutrition    ICD-10-CM ICD-9-CM    1. Seizure ([address] R56.9 780.39    2. Obesity (BMI 30-39. 9) E66.9 278.00    3. Nausea and vomiting, intractability of vomiting not specified, unspecified vomiting type R11.2 787.01    4. Type 2 diabetes mellitus with hyperglycemia, with long-term current use of insulin (HCC) E11.65 250.00 AMB POC HEMOGLOBIN A1C    Z79.4 790.29 AMB POC GLUCOSE BLOOD, BY GLUCOSE MONITORING DEVICE     V58.67    5. Medicare annual wellness visit, subsequent Z00.00 V70.0    6. Advance care planning Z71.89 V65.49 ADVANCE CARE PLANNING FIRST 30 MINS   . Brief written plan, checklist    I have discussed the diagnosis with the patient and the intended plan as seen in the above orders. The patient has received an after-visit summary and questions were answered concerning future plans. I have discussed medication side effects and warnings with the patient as well. I have reviewed the plan of care with the patient, accepted their input and they are in agreement with the treatment goals. Follow-up Disposition: Not on File      ____________________________________________________________    Problem Assessment    for treatment of   Chief Complaint   Patient presents with    Annual Wellness Visit    Diabetes    Hypertension         SUBJECTIVE  Pt presents for annual wellness exam and for f/u diabetes, hypertension and brain lesion. His daughter reports that if the lymphoma has returned the neurosurgeon has said there is nothing more to do. Hypertension  Patient is here for follow-up of hypertension. She indicates that she is feeling well and denies any symptoms referable to her hypertension. She is not exercising and is adherent to low salt diet. Blood pressure is well controlled at home. Use of agents associated with hypertension: none.     Diabetes Mellitus:  She has diabetes mellitus, and  hyperlipidemia and obesity. She did not eat today but used her insulin. Diabetic ROS - medication compliance: compliant most of the time, diabetic diet compliance: noncompliant some of the time, home glucose monitoring: is performed regularly. Lab review: orders written for new lab studies as appropriate; see orders. During interview patient had staring spell and tremor and was confused afterwards. Visit Vitals    BP 98/52 (BP 1 Location: Left arm, BP Patient Position: Sitting)    Pulse (!) 49    Temp 97.9 °F (36.6 °C) (Oral)    Resp 17    Ht 5' 2\" (1.575 m)    Wt 210 lb (95.3 kg)    SpO2 98%    BMI 38.41 kg/m2     General:  Answers questions when questioned but ill appearing after staring and tremors. Head:  Normocephalic, without obvious abnormality, atraumatic. Eyes:  Conjunctivae/corneas clear. PERRL, EOMs intact. Ears:  External ears normal; no hemotympanum   Nose: Nares normal. Clear rhinorrhea   Throat: Lips, mucosa, and tongue normal. Teeth and gums normal.   Neck: Supple, symmetrical, trachea midline, no adenopathy, thyroid: no enlargement/tenderness/nodules, no carotid bruit and no JVD. Back:   Symmetric, no curvature. ROM normal. No CVA tenderness. Lungs:   Clear to auscultation bilaterally. Chest wall:  No tenderness or deformity. Heart:  Regular rate and rhythm, S1, S2 normal, no murmur, click, rub or gallop. Breast Exam:  No tenderness, masses, or nipple abnormality. Abdomen:   Soft, non-tender. Bowel sounds normal. No masses,  No organomegaly. Genitalia:  deferred   Rectal:  deferred   Extremities: Extremities normal, atraumatic, no cyanosis or edema. Pulses: 2+ and symmetric all extremities. Skin: Diaphoretic and pale. No rashes or lesions. Lymph nodes: Cervical, supraclavicular, and axillary nodes normal.   Neurologic: CNII-XII intact. Normal strength, sensation and reflexes throughout.      LABS   hgb a1c 8.3  Glucose 133    Assessment/Plan: 1. Seizure ([address]  Suspected episode; EMT called to transport pt to ER    2. Obesity (BMI 30-39. 9)  I have reviewed/discussed the above normal BMI with the patient. I have recommended the following interventions: dietary management education, guidance, and counseling and encourage exercise . Roberta Munoz 3. Nausea and vomiting, intractability of vomiting not specified, unspecified vomiting type  Hypotensive, diaphoretic with normal glucose level; possibly post ictal    4. Type 2 diabetes mellitus with hyperglycemia, with long-term current use of insulin ([facility])  Goal hgb a1c <7; poorly controlled; needs to follow diet more closely  - AMB POC HEMOGLOBIN A1C  - AMB POC GLUCOSE BLOOD, BY GLUCOSE MONITORING DEVICE    5. Medicare annual wellness visit, subsequent  Reviewed preventive recommendations    6.  Advance care planning  See ACP  - ADVANCE CARE PLANNING FIRST 30 MINS      Lab review: labs reviewed, I note that glycosylated hemoglobin abnormal high

## 2017-04-15 LAB
ATRIAL RATE: 56 BPM
CALCULATED P AXIS, ECG09: 57 DEGREES
CALCULATED R AXIS, ECG10: -9 DEGREES
CALCULATED T AXIS, ECG11: 87 DEGREES
DIAGNOSIS, 93000: NORMAL
P-R INTERVAL, ECG05: 164 MS
Q-T INTERVAL, ECG07: 454 MS
QRS DURATION, ECG06: 82 MS
QTC CALCULATION (BEZET), ECG08: 438 MS
VENTRICULAR RATE, ECG03: 56 BPM

## 2017-04-16 LAB
BACTERIA SPEC CULT: ABNORMAL
SERVICE CMNT-IMP: ABNORMAL

## 2017-04-18 NOTE — ACP (ADVANCE CARE PLANNING)
Advance Care Planning (ACP) Provider Note - Comprehensive     Date of ACP Conversation: 04/14/17  Persons included in Conversation:  patient and POA  Length of ACP Conversation in minutes:  16 minutes    Authorized Decision Maker (if patient is incapable of making informed decisions): This person is:  her daughter          General ACP for ALL Patients with Decision Making Capacity:   Importance of advance care planning, including choosing a healthcare agent to communicate patient's healthcare decisions if patient lost the ability to make decisions, such as after a sudden illness or accident  Understanding of the healthcare agent role was assessed and information provided  Exploration of values, goals, and preferences if recovery is not expected, even with continued medical treatment in the event of: Imminent death  Severe, permanent brain injury  \"In these circumstances, what matters most to you? \"  Care focused more on comfort or quality of life. \"What, if any, treatments would you want to avoid? \" nothing  Opportunity offered to explore how cultural, Latter-day, spiritual, or personal beliefs would affect decisions for future care     Review of Existing Advance Directive:  What information were you given about medical decisions to consider before completing your advance directive? decision making tools    For Serious or Chronic Illness:  Understanding of medical condition      Interventions Provided:  Reviewed existing Advance Directive

## 2017-04-20 ENCOUNTER — OFFICE VISIT (OUTPATIENT)
Dept: FAMILY MEDICINE CLINIC | Age: 67
End: 2017-04-20

## 2017-04-20 VITALS
RESPIRATION RATE: 17 BRPM | HEIGHT: 62 IN | WEIGHT: 210 LBS | BODY MASS INDEX: 38.64 KG/M2 | SYSTOLIC BLOOD PRESSURE: 138 MMHG | DIASTOLIC BLOOD PRESSURE: 70 MMHG | HEART RATE: 96 BPM | TEMPERATURE: 98.6 F | OXYGEN SATURATION: 99 %

## 2017-04-20 DIAGNOSIS — E11.65 TYPE 2 DIABETES MELLITUS WITH HYPERGLYCEMIA, WITH LONG-TERM CURRENT USE OF INSULIN (HCC): ICD-10-CM

## 2017-04-20 DIAGNOSIS — E66.9 OBESITY (BMI 30-39.9): ICD-10-CM

## 2017-04-20 DIAGNOSIS — Z79.4 TYPE 2 DIABETES MELLITUS WITH HYPERGLYCEMIA, WITH LONG-TERM CURRENT USE OF INSULIN (HCC): ICD-10-CM

## 2017-04-20 DIAGNOSIS — B37.31 VAGINA, CANDIDIASIS: ICD-10-CM

## 2017-04-20 DIAGNOSIS — N30.00 ACUTE CYSTITIS WITHOUT HEMATURIA: Primary | ICD-10-CM

## 2017-04-20 DIAGNOSIS — R53.81 PHYSICAL DECONDITIONING: ICD-10-CM

## 2017-04-20 RX ORDER — FLUCONAZOLE 150 MG/1
150 TABLET ORAL DAILY
Qty: 1 TAB | Refills: 0 | Status: SHIPPED | OUTPATIENT
Start: 2017-04-20 | End: 2017-04-21

## 2017-04-20 NOTE — MR AVS SNAPSHOT
Visit Information Date & Time Provider Department Dept. Phone Encounter #  
 4/20/2017 10:45 AM Red Wheeler, [address] [facility] [phone] [id number] Follow-up Instructions Return in about 3 months (around 7/20/2017) for diabetes. Upcoming Health Maintenance Date Due Hepatitis C Screening 1950 COLONOSCOPY 10/3/1967 ZOSTER VACCINE AGE 60> 10/3/2009 MICROALBUMIN Q1 2/19/2017 LIPID PANEL Q1 2/19/2017 FOOT EXAM Q1 4/7/2017 EYE EXAM RETINAL OR DILATED Q1 7/20/2017 HEMOGLOBIN A1C Q6M 10/14/2017 MEDICARE YEARLY EXAM 4/15/2018 GLAUCOMA SCREENING Q2Y 7/20/2018 BREAST CANCER SCRN MAMMOGRAM 3/31/2019 DTaP/Tdap/Td series (2 - Td) 7/6/2026 Allergies as of 4/20/2017  Review Complete On: 4/20/2017 By: Red Wheeler MD  
  
 Severity Noted Reaction Type Reactions Latex, Natural Rubber  07/30/2014    Other (comments) Swelling in hands and lips Iodine High 01/23/2014    Anaphylaxis, Swelling Current Immunizations  Reviewed on 9/30/2016 Name Date Influenza High Dose Vaccine PF 9/30/2016 10:03 AM  
 Pneumococcal Conjugate (PCV-13) 9/30/2016 10:03 AM  
  
 Not reviewed this visit You Were Diagnosed With   
  
 Codes Comments Acute cystitis without hematuria    -  Primary ICD-10-CM: N30.00 ICD-9-CM: 595.0 Obesity (BMI 30-39. 9)     ICD-10-CM: E66.9 ICD-9-CM: 278.00 Type 2 diabetes mellitus with hyperglycemia, with long-term current use of insulin (HCC)     ICD-10-CM: E11.65, Z79.4 ICD-9-CM: 250.00, 790.29, V58.67 Physical deconditioning     ICD-10-CM: R53.81 ICD-9-CM: 799.3 Vagina, candidiasis     ICD-10-CM: B37.3 ICD-9-CM: 112.1 Vitals BP Pulse Temp Resp Height(growth percentile) Weight(growth percentile) 138/70 (BP 1 Location: Right arm, BP Patient Position: Sitting) 96 98.6 °F (37 °C) (Oral) 17 5' 2\" (1.575 m) 210 lb (95.3 kg) SpO2 BMI OB Status Smoking Status 99% 38.41 kg/m2 Hysterectomy Former Smoker BMI and BSA Data Body Mass Index Body Surface Area  
 38.41 kg/m 2 2.04 m 2 Preferred Pharmacy Pharmacy Name Phone [facility] [place] [address] [address] [phone] Your Updated Medication List  
  
   
This list is accurate as of: 4/20/17 11:36 AM.  Always use your most recent med list. ADVIL PM PO Take  by mouth.  
  
 canagliflozin 100 mg tablet Commonly known as:  Rudolph Beverage Take 1 Tab by mouth Daily (before breakfast). cephALEXin 500 mg capsule Commonly known as:  Kerry Greener Take 1 Cap by mouth four (4) times daily for 7 days. citalopram 20 mg tablet Commonly known as:  Corie Brunette Take 1 Tab by mouth daily. Indications: MAJOR DEPRESSIVE DISORDER  
  
 dilTIAZem  mg XR capsule Commonly known as:  DILACOR XR Take 1 Cap by mouth daily. DUREZOL 0.05 % ophthalmic emulsion Generic drug:  Difluprednate  
  
 enalapril 20 mg tablet Commonly known as:  Danni Mosley Take 1 Tab by mouth daily. ergocalciferol 50,000 unit capsule Commonly known as:  ERGOCALCIFEROL Take 1 Cap by mouth every seven (7) days. fluconazole 150 mg tablet Commonly known as:  DIFLUCAN Take 1 Tab by mouth daily for 1 day. FDA advises cautious prescribing of oral fluconazole in pregnancy. ibuprofen 200 mg tablet Commonly known as:  MOTRIN Take 2 Tabs by mouth every eight (8) hours as needed for Pain. Indications: PAIN  
  
 ILEVRO 0.3 % Drps Generic drug:  nepafenac  
  
 insulin detemir 100 unit/mL (3 mL) Inpn Commonly known as:  Lisette Corti Give 40 units at bedtime under the skin  Indications: TYPE 2 DIABETES MELLITUS NovoLOG Flexpen 100 unit/mL Inpn Generic drug:  insulin aspart INJECT  30 UNITS SUBCUTANEOUSLY IN THE MORNING  AND  20 UNITS AT NIGHT. INDICATIONS: DIABETES MELLITUS  
  
 ondansetron 4 mg disintegrating tablet Commonly known as:  ZOFRAN ODT Take 1 Tab by mouth every eight (8) hours as needed for Nausea. Prescriptions Sent to Pharmacy Refills  
 fluconazole (DIFLUCAN) 150 mg tablet 0 Sig: Take 1 Tab by mouth daily for 1 day. FDA advises cautious prescribing of oral fluconazole in pregnancy. Class: Normal  
 Pharmacy: [address] [place] [address], [address] [place] [address] #: [phone] Route: Oral  
  
Follow-up Instructions Return in about 3 months (around 7/20/2017) for diabetes. Patient Instructions Vaginal Yeast Infection: Care Instructions Your Care Instructions A vaginal yeast infection is caused by too many yeast cells in the vagina. This is common in women of all ages. Itching, vaginal discharge and irritation, and other symptoms can bother you. But yeast infections don't often cause other health problems. Some medicines can increase your risk of getting a yeast infection. These include antibiotics, birth control pills, hormones, and steroids. You may also be more likely to get a yeast infection if you are pregnant, have diabetes, douche, or wear tight clothes. With treatment, most yeast infections get better in 2 to 3 days. Follow-up care is a key part of your treatment and safety. Be sure to make and go to all appointments, and call your doctor if you are having problems. It's also a good idea to know your test results and keep a list of the medicines you take. How can you care for yourself at home? · Take your medicines exactly as prescribed. Call your doctor if you think you are having a problem with your medicine. · Ask your doctor about over-the-counter (OTC) medicines for yeast infections. They may cost less than prescription medicines. If you use an OTC treatment, read and follow all instructions on the label. · Do not use tampons while using a vaginal cream or suppository. The tampons can absorb the medicine. Use pads instead. · Wear loose cotton clothing. Do not wear nylon or other fabric that holds body heat and moisture close to the skin. · Try sleeping without underwear. · Do not scratch. Relieve itching with a cold pack or a cool bath. · Do not wash your vaginal area more than once a day. Use plain water or a mild, unscented soap. Air-dry the vaginal area. · Change out of wet swimsuits after swimming. · Do not have sex until you have finished your treatment. · Do not douche. When should you call for help? Call your doctor now or seek immediate medical care if: 
· You have unexpected vaginal bleeding. · You have new or increased pain in your vagina or pelvis. Watch closely for changes in your health, and be sure to contact your doctor if: 
· You have a fever. · You are not getting better after 2 days. · Your symptoms come back after you finish your medicines. Where can you learn more? Go to http://elizabeth-demetris.info/. Enter Y909 in the search box to learn more about \"Vaginal Yeast Infection: Care Instructions. \" Current as of: October 13, 2016 Content Version: 11.2 © 0209-3347 [employer]. Care instructions adapted under license by [employer] (which disclaims liability or warranty for this information). If you have questions about a medical condition or this instruction, always ask your healthcare professional. [address] any warranty or liability for your use of this information. Introducing [facility]! Dear Benton Lefort: Thank you for requesting a Social DJ account. Our records indicate that you already have an active Social DJ account. You can access your account anytime at [email or web address] Did you know that you can access your hospital and ER discharge instructions at any time in Social DJ? You can also review all of your test results from your hospital stay or ER visit. Additional Information If you have questions, please visit the Frequently Asked Questions section of the [employer] website at [email or web address] Remember, [employer] is NOT to be used for urgent needs. For medical emergencies, dial 911. Now available from your iPhone and Android! Please provide this summary of care documentation to your next provider. Your primary care clinician is listed as Courtney Barnard. If you have any questions after today's visit, please call [phone].

## 2017-04-20 NOTE — PATIENT INSTRUCTIONS

## 2017-04-20 NOTE — PROGRESS NOTES
Akil Rodriguez is a 78 y.o.  female and presents with    Chief Complaint   Patient presents with   [facility] Follow Up       Subjective:  Ms. López Polanco presents for f/u after ER evaluation for change in mental status. She was transferred via EMS and underwent laboratory study and imaging; she was found to have a UTI and is taking keflex without side effects. She now c/o vaginal itching. Diabetes Mellitus:  She has diabetes mellitus, and  hypertension, hyperlipidemia and obesity. Diabetic ROS - medication compliance: compliant all of the time, diabetic diet compliance: improved, home glucose monitoring: improved with increased fluid intake after ER evaluation. Lab review: labs reviewed, I note that glycosylated hemoglobin abnormal high. Blood glucose improved after ER visit for a couple days but has returned    Patient Active Problem List   Diagnosis Code    DM (diabetes mellitus) ([facility] E11.9    Essential hypertension, benign I10    Morbid obesity ([facility] E66.01    Lymphoma in remission Z85.72    Advance care planning Z71.89     Patient Active Problem List    Diagnosis Date Noted    Advance care planning 07/06/2016    DM (diabetes mellitus) ([facility] [facility] 01/23/2014    Essential hypertension, benign 01/23/2014    Morbid obesity ([facility] [facility] 01/23/2014    Lymphoma in remission 01/23/2014     Current Outpatient Prescriptions   Medication Sig Dispense Refill    cephALEXin (KEFLEX) 500 mg capsule Take 1 Cap by mouth four (4) times daily for 7 days. 28 Cap 0    NOVOLOG FLEXPEN 100 unit/mL inpn INJECT  30 UNITS SUBCUTANEOUSLY IN THE MORNING  AND  20 UNITS AT NIGHT. INDICATIONS: DIABETES MELLITUS 45 mL 1    DUREZOL 0.05 % ophthalmic emulsion       ILEVRO 0.3 % drps       IBUPROFEN/DIPHENHYDRAMINE CIT (ADVIL PM PO) Take  by mouth.  ondansetron (ZOFRAN ODT) 4 mg disintegrating tablet Take 1 Tab by mouth every eight (8) hours as needed for Nausea.  20 Tab 0    citalopram (CELEXA) 20 mg tablet Take 1 Tab by mouth daily. Indications: MAJOR DEPRESSIVE DISORDER 30 Tab 11    enalapril (VASOTEC) 20 mg tablet Take 1 Tab by mouth daily. 90 Tab 3    diltiazem XR (DILACOR XR) 180 mg XR capsule Take 1 Cap by mouth daily. 90 Cap 3    ibuprofen (MOTRIN) 200 mg tablet Take 2 Tabs by mouth every eight (8) hours as needed for Pain. Indications: PAIN 30 Tab 0    canagliflozin (INVOKANA) 100 mg tablet Take 1 Tab by mouth Daily (before breakfast). 30 Tab 11    insulin detemir (LEVEMIR FLEXPEN) 100 unit/mL (3 mL) inpn Give 40 units at bedtime under the skin  Indications: TYPE 2 DIABETES MELLITUS 15 Pen 1    ergocalciferol (ERGOCALCIFEROL) 50,000 unit capsule Take 1 Cap by mouth every seven (7) days. 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 No pertinent family history.   Social History   Substance Use Topics    Smoking status: Former Smoker    Smokeless tobacco: Never Used    Alcohol use No       ROS   General ROS: negative for - chills or fever  Psychological ROS: negative for - anxiety or depression  Ophthalmic ROS: negative for - blurry vision  ENT ROS: negative for - headaches  Endocrine ROS: negative for - polydipsia/polyuria or temperature intolerance  Respiratory ROS: no cough, shortness of breath, or wheezing  Cardiovascular ROS: no chest pain or dyspnea on exertion  Gastrointestinal ROS: no abdominal pain, change in bowel habits, or black or bloody stools  Genito-Urinary ROS: no dysuria, trouble voiding, or hematuria  Musculoskeletal ROS: negative for - joint pain or muscle pain  Neurological ROS: negative for - numbness/tingling or weakness  Dermatological ROS: negative for - rash or skin lesion changes    All other systems reviewed and are negative. Objective:  Vitals:    04/20/17 1050   BP: 138/70   Pulse: 96   Resp: 17   Temp: 98.6 °F (37 °C)   TempSrc: Oral   SpO2: 99%   Weight: 210 lb (95.3 kg)   Height: 5' 2\" (1.575 m)       alert, well appearing, and in no distress, oriented to person, place, and time and obese  Mental status - normal mood, behavior, speech, dress, motor activity, and thought processes  Chest - clear to auscultation, no wheezes, rales or rhonchi, symmetric air entry  Heart - normal rate, regular rhythm, normal S1, S2, no murmurs, rubs, clicks or gallops  Neurological - guarded gait using walker for assistance  Extremities - peripheral pulses normal, no pedal edema, no clubbing or cyanosis  Skin - normal coloration and turgor, no rashes, no suspicious skin lesions noted    LABS   Urine culture - E. coli  TESTS  Head CT  IMPRESSION:     1. No acute intracranial hemorrhage, mass effect, midline shift, or herniation. No definite CT evidence of acute cortical infarct is seen. Please note that  noncontrast head CT may be normal in early acute infarct.      2. Hypodense cystic-appearing lesion in the anterior left frontal lobe without  current mass effect, instead with adjacent encephalomalacic changes and evidence  of interval volume loss. 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 Of note, a solid appearing, enhancing intra-axial mass was  present in this location on prior 7/12 MR brain study, and underwent needle  biopsy. Questionable tiny hyperdense contralateral right frontal cystic lesion,  not definitive, but also in location of another previously seen enhancing solid  appearing lesion in 2012. These cystic appearing lesions would be an unusual  presentation of cystic residual/recurrent tumor, although not excluded.   Follow-up MR brain with gadolinium may be helpful.     3. Relatively confluent bifrontal white matter hypodensity, nonspecific perhaps  representing postradiation changes given prior bifrontal masses.     4. Additional stable, mild nonspecific white matter disease likely representing  chronic small vessel changes. Assessment/Plan:    1. Acute cystitis without hematuria  Improved with current antibiotic regimen; increase hydration    2. Obesity (BMI 30-39. 9)  I have reviewed/discussed the above normal BMI with the patient. I have recommended the following interventions: dietary management education, guidance, and counseling, monitor weight and prescribed dietary intake . 3. Type 2 diabetes mellitus with hyperglycemia, with long-term current use of insulin (HCC)  Goal hgb a1c <7; encourage compliance with diet and medication regimen    4. Physical deconditioning  Exercise as tolerated    5. Vagina, candidiasis  Antibiotic induced change in vaginal javier  - fluconazole (DIFLUCAN) 150 mg tablet; Take 1 Tab by mouth daily for 1 day. FDA advises cautious prescribing of oral fluconazole in pregnancy. Dispense: 1 Tab; Refill: 0      Lab review: labs reviewed, I note that hemogram normal, basic metabolic panel normal      I have discussed the diagnosis with the patient and the intended plan as seen in the above orders. The patient has received an after-visit summary and questions were answered concerning future plans. I have discussed medication side effects and warnings with the patient as well. I have reviewed the plan of care with the patient, accepted their input and they are in agreement with the treatment goals. Follow-up Disposition:  Return in about 3 months (around 7/20/2017) for diabetes.

## 2017-05-18 NOTE — TELEPHONE ENCOUNTER
Patient requesting the following medication refill     Medication requesting   canagliflozin (Invokana) 100mg tablet  Citalopram (Celexa) 20mg tablet    Date last prescribed 07/22/2016       QTY 30 Refills 11    Date patient last seen 04/20/2017    Follow up appt scheduled for 07/21/2017    Please advise

## 2017-06-05 NOTE — TELEPHONE ENCOUNTER
Patient requesting the following medication refill     Medication requesting:    enalapril (Vasotec) 20mg tablet  Ergocalciferol (Ergocalciferol) 50,000 unit capsule    Date last prescribed 07/22/2016    QTY 90 Refills 3    Date patient last seen 04/20/2017    Follow up appt scheduled for 07/21/2017    Please advise

## 2017-07-21 NOTE — MR AVS SNAPSHOT
Visit Information Date & Time Provider Department Dept. Phone Encounter #  
 7/21/2017 10:45 AM Eddie Kumar, [address] [phone] [id number] Follow-up Instructions Return in about 6 months (around 1/21/2018) for diabetes. Upcoming Health Maintenance Date Due Hepatitis C Screening 1950 COLONOSCOPY 10/3/1967 ZOSTER VACCINE AGE 60> 8/3/2009 MICROALBUMIN Q1 2/19/2017 LIPID PANEL Q1 2/19/2017 FOOT EXAM Q1 4/7/2017 EYE EXAM RETINAL OR DILATED Q1 7/20/2017 INFLUENZA AGE 9 TO ADULT 8/1/2017 HEMOGLOBIN A1C Q6M 10/14/2017 MEDICARE YEARLY EXAM 4/15/2018 GLAUCOMA SCREENING Q2Y 7/20/2018 BREAST CANCER SCRN MAMMOGRAM 3/31/2019 DTaP/Tdap/Td series (2 - Td) 7/6/2026 Allergies as of 7/21/2017  Review Complete On: 7/21/2017 By: Eddie Kumar MD  
  
 Severity Noted Reaction Type Reactions Latex, Natural Rubber  07/30/2014    Other (comments) Swelling in hands and lips Iodine High 01/23/2014    Anaphylaxis, Swelling Current Immunizations  Reviewed on 9/30/2016 Name Date Influenza High Dose Vaccine PF 9/30/2016 10:03 AM  
 Pneumococcal Conjugate (PCV-13) 9/30/2016 10:03 AM  
  
 Not reviewed this visit You Were Diagnosed With   
  
 Codes Comments Type 2 diabetes mellitus with hyperglycemia, with long-term current use of insulin (HCC)    -  Primary ICD-10-CM: E11.65, Z79.4 ICD-9-CM: 250.00, 790.29, V58.67 Essential hypertension with goal blood pressure less than 140/90     ICD-10-CM: I10 
ICD-9-CM: 401.9 Vitamin D deficiency     ICD-10-CM: E55.9 ICD-9-CM: 268.9 Need for hepatitis C screening test     ICD-10-CM: Z11.59 
ICD-9-CM: V73.89 Diabetic polyneuropathy associated with diabetes mellitus due to underlying condition ([facility] [email or web address] 75.)     ICD-10-CM: N77.26 
ICD-9-CM: 249.60, 357.2 Vitals BP Pulse Temp Resp Height(growth percentile) Weight(growth percentile) 136/72 (BP 1 Location: Left arm, BP Patient Position: Sitting) (!) 56 98 °F (36.7 °C) (Oral) 16 5' 2\" (1.575 m) 208 lb 9.6 oz (94.6 kg) SpO2 BMI OB Status Smoking Status 95% 38.15 kg/m2 Hysterectomy Former Smoker Vitals History BMI and BSA Data Body Mass Index Body Surface Area  
 38.15 kg/m 2 2.03 m 2 Preferred Pharmacy Pharmacy Name Phone [facility] [facility] [address] [place] [address] [phone] Your Updated Medication List  
  
   
This list is accurate as of: 7/21/17 11:39 AM.  Always use your most recent med list. ADVIL PM PO Take  by mouth.  
  
 canagliflozin 100 mg tablet Commonly known as:  Cecile  Take 1 Tab by mouth Daily (before breakfast). citalopram 20 mg tablet Commonly known as:  Margreta Shainta Take 1 Tab by mouth daily. Indications: major depressive disorder  
  
 dilTIAZem  mg XR capsule Commonly known as:  DILACOR XR Take 1 Cap by mouth daily. DUREZOL 0.05 % ophthalmic emulsion Generic drug:  Difluprednate  
  
 enalapril 20 mg tablet Commonly known as:  Darreld Standard Take 1 Tab by mouth daily. ergocalciferol 50,000 unit capsule Commonly known as:  ERGOCALCIFEROL Take 1 Cap by mouth every seven (7) days. ILEVRO 0.3 % Drps Generic drug:  nepafenac  
  
 levETIRAcetam 750 mg ER tablet Commonly known as:  KEPPRA XR Take 1 Tab by Mouth Every Night at Bedtime. 3 MO SUPPLY NovoLOG Flexpen 100 unit/mL Inpn Generic drug:  insulin aspart INJECT  30 UNITS SUBCUTANEOUSLY IN THE MORNING  AND  20 UNITS AT NIGHT FOR DIABETES  
  
 ondansetron 4 mg disintegrating tablet Commonly known as:  ZOFRAN ODT Take 1 Tab by mouth every eight (8) hours as needed for Nausea. We Performed the Following AMB POC FUNDUS PHOTOGRAPHY WITH INTERP AND REPORT [74619 CPT(R)] AMB POC HEMOGLOBIN A1C [98985 CPT(R)]  DIABETES FOOT EXAM [7 Custom] Follow-up Instructions Return in about 6 months (around 1/21/2018) for diabetes. To-Do List   
 07/21/2017 Lab:  HEPATITIS C AB   
  
 07/21/2017 Lab:  LIPID PANEL Patient Instructions Learning About Foot and Toenail Care Checking your loved one's feet and keeping them clean and soft can help prevent cracks and infection in the skin. This is especially important for people who have diabetes. Keeping toenails trimmedand polished if that's what the person likesalso helps the person feel well-groomed. If the person you care for has diabetes or has foot problems, such as bad bunions and corns, think about taking them to see a podiatrist. This is a doctor who specializes in the care of the feet. Sometimes a podiatrist will come to the home if the person can't go out for visits. Try to take the person for salon pedicures if that is what they want. It's a chance to get out and see people and continue a favorite activity. You can do basic nail care at home. Usually all you need to do is keep the nails clean and at a safe length. How do you trim someone's toenails? Try to trim the person's nails every week. Or check the nails each week to see if they need to be trimmed. It's easiest to trim nails after the person has had a shower or foot bath. It makes the nails softer and easier to trim. Start by gathering your supplies. You will need toenail clippers and a nail file. You may also need nail polish and nail polish remover. To trim the nails: 
1. Wash and dry your hands. You don't need to wear gloves. 2. Use nail polish remover to take off any polish. 3. Hold the person's foot and toe steady with one hand while you trim the nail with your other hand. Trim the nails straight across. Leave the nails a little longer at the corners so that the sharp ends don't cut into the skin. 4. Keep the nails no longer than the tip of the toes. 5. Let the nails dry if they are still damp and soft. 6. Use a nail file to gently smooth the edges of the nails, especially at the corners. They may be sharp after the nails are cut straight. 7. Apply nail polish, if the person wants it. If the person's nails are thick and discolored, it may be safest to have a podiatrist cut them. What else do you need to know? When you're caring for someone's nails, it is important to remember not to trim or cut the cuticles. A minor cut in a cuticle could lead to an infection. Wash the feet daily in the shower or bath or in a basin made for washing feet. It's extra important to wash the feet carefully if the person has diabetes. After washing the feet, dry gently. Put lotion on the feet, especially on the heels. But don't put it between the toes. If the person doesn't have diabetes and you see signs of athlete's foot (such as dry, cracking, or itchy skin between the toes), you can try an over-the-counter medicine. These medicines can kill the fungus that causes athlete's foot. If the problem doesn't go away, talk to the person's doctor. Look every day for cuts or signs of infection, such as pain, swelling, redness, or warmth. If you see any of these signsespecially in someone who has diabetescall the doctor. Where can you learn more? Go to http://elizabeth-demetris.info/. Enter A726 in the search box to learn more about \"Learning About Foot and Toenail Care. \" Current as of: March 17, 2017 Content Version: 11.3 © 6578-5042 [employer]. Care instructions adapted under license by [employer] (which disclaims liability or warranty for this information). If you have questions about a medical condition or this instruction, always ask your healthcare professional. [address] any warranty or liability for your use of this information. Diabetes Foot Health: Care Instructions Your Care Instructions When you have diabetes, your feet need extra care and attention. Diabetes can damage the nerve endings and blood vessels in your feet, making you less likely to notice when your feet are injured. Diabetes also limits your body's ability to fight infection and get blood to areas that need it. If you get a minor foot injury, it could become an ulcer or a serious infection. With good foot care, you can prevent most of these problems. Caring for your feet can be quick and easy. Most of the care can be done when you are bathing or getting ready for bed. Follow-up care is a key part of your treatment and safety. Be sure to make and go to all appointments, and call your doctor if you are having problems. Its also a good idea to know your test results and keep a list of the medicines you take. How can you care for yourself at home? · Keep your blood sugar close to normal by watching what and how much you eat, monitoring blood sugar, taking medicines if prescribed, and getting regular exercise. · Do not smoke. Smoking affects blood flow and can make foot problems worse. If you need help quitting, talk to your doctor about stop-smoking programs and medicines. These can increase your chances of quitting for good. · Eat a diet that is low in fats. High fat intake can cause fat to build up in your blood vessels and decrease blood flow. · Inspect your feet daily for blisters, cuts, cracks, or sores. If you cannot see well, use a mirror or have someone help you. · Take care of your feet: 
[facility] AUTHORITY your feet every day. Use warm (not hot) water. Check the water temperature with your wrists or other part of your body, not your feet. ¨ Dry your feet well. Pat them dry. Do not rub the skin on your feet too hard. Dry well between your toes. If the skin on your feet stays moist, bacteria or a fungus can grow, which can lead to infection. ¨ Keep your skin soft.  Use moisturizing skin cream to keep the skin on your feet soft and prevent calluses and cracks. But do not put the cream between your toes, and stop using any cream that causes a rash. ¨ Clean underneath your toenails carefully. Do not use a sharp object to clean underneath your toenails. Use the blunt end of a nail file or other rounded tool. ¨ Trim and file your toenails straight across to prevent ingrown toenails. Use a nail clipper, not scissors. Use an emery board to smooth the edges. · Change socks daily. Socks without seams are best, because seams often rub the feet. You can find socks for people with diabetes from specialty catalogs. · Look inside your shoes every day for things like gravel or torn linings, which could cause blisters or sores. · Buy shoes that fit well: 
¨ Look for shoes that have plenty of space around the toes. This helps prevent bunions and blisters. ¨ Try on shoes while wearing the kind of socks you will usually wear with the shoes. ¨ Avoid plastic shoes. They may rub your feet and cause blisters. Good shoes should be made of materials that are flexible and breathable, such as leather or cloth. ¨ Break in new shoes slowly by wearing them for no more than an hour a day for several days. Take extra time to check your feet for red areas, blisters, or other problems after you wear new shoes. · Do not go barefoot. Do not wear sandals, and do not wear shoes with very thin soles. Thin soles are easy to puncture. They also do not protect your feet from hot pavement or cold weather. · Have your doctor check your feet during each visit. If you have a foot problem, see your doctor. Do not try to treat an early foot problem at home. Home remedies or treatments that you can buy without a prescription (such as corn removers) can be harmful. · Always get early treatment for foot problems. A minor irritation can lead to a major problem if not properly cared for early. When should you call for help? Call your doctor now or seek immediate medical care if: 
· You have a foot sore, an ulcer or break in the skin that is not healing after 4 days, bleeding corns or calluses, or an ingrown toenail. · You have blue or black areas, which can mean bruising or blood flow problems. · You have peeling skin or tiny blisters between your toes or cracking or oozing of the skin. · You have a fever for more than 24 hours and a foot sore. · You have new numbness or tingling in your feet that does not go away after you move your feet or change positions. · You have unexplained or unusual swelling of the foot or ankle. Watch closely for changes in your health, and be sure to contact your doctor if: 
· You cannot do proper foot care. Where can you learn more? Go to http://elizabeth-demetris.info/. Enter A739 in the search box to learn more about \"Diabetes Foot Health: Care Instructions. \" Current as of: March 13, 2017 Content Version: 11.3 © 6391-5842 [employer]. Care instructions adapted under license by [employer] (which disclaims liability or warranty for this information). If you have questions about a medical condition or this instruction, always ask your healthcare professional. [address] any warranty or liability for your use of this information. Introducing [facility]! Dear Lianna Watts: Thank you for requesting a [employer] account. Our records indicate that you already have an active [employer] account. You can access your account anytime at [email or web address] Did you know that you can access your hospital and ER discharge instructions at any time in [employer]? You can also review all of your test results from your hospital stay or ER visit. Additional Information If you have questions, please visit the Frequently Asked Questions section of the [employer] website at [email or web address] Remember, MyChart is NOT to be used for urgent needs. For medical emergencies, dial 911. Now available from your iPhone and Android! Please provide this summary of care documentation to your next provider. Your primary care clinician is listed as Prettyil Adelina. If you have any questions after today's visit, please call [phone].

## 2017-07-21 NOTE — PATIENT INSTRUCTIONS
Learning About Foot and Toenail Care  Checking your loved one's feet and keeping them clean and soft can help prevent cracks and infection in the skin. This is especially important for people who have diabetes. Keeping toenails trimmedand polished if that's what the person likesalso helps the person feel well-groomed. If the person you care for has diabetes or has foot problems, such as bad bunions and corns, think about taking them to see a podiatrist. This is a doctor who specializes in the care of the feet. Sometimes a podiatrist will come to the home if the person can't go out for visits. Try to take the person for salon pedicures if that is what they want. It's a chance to get out and see people and continue a favorite activity. You can do basic nail care at home. Usually all you need to do is keep the nails clean and at a safe length. How do you trim someone's toenails? Try to trim the person's nails every week. Or check the nails each week to see if they need to be trimmed. It's easiest to trim nails after the person has had a shower or foot bath. It makes the nails softer and easier to trim. Start by gathering your supplies. You will need toenail clippers and a nail file. You may also need nail polish and nail polish remover. To trim the nails:  1. Wash and dry your hands. You don't need to wear gloves. 2. Use nail polish remover to take off any polish. 3. Hold the person's foot and toe steady with one hand while you trim the nail with your other hand. Trim the nails straight across. Leave the nails a little longer at the corners so that the sharp ends don't cut into the skin. 4. Keep the nails no longer than the tip of the toes. 5. Let the nails dry if they are still damp and soft. 6. Use a nail file to gently smooth the edges of the nails, especially at the corners. They may be sharp after the nails are cut straight. 7. Apply nail polish, if the person wants it.   If the person's nails are thick and discolored, it may be safest to have a podiatrist cut them. What else do you need to know? When you're caring for someone's nails, it is important to remember not to trim or cut the cuticles. A minor cut in a cuticle could lead to an infection. Wash the feet daily in the shower or bath or in a basin made for washing feet. It's extra important to wash the feet carefully if the person has diabetes. After washing the feet, dry gently. Put lotion on the feet, especially on the heels. But don't put it between the toes. If the person doesn't have diabetes and you see signs of athlete's foot (such as dry, cracking, or itchy skin between the toes), you can try an over-the-counter medicine. These medicines can kill the fungus that causes athlete's foot. If the problem doesn't go away, talk to the person's doctor. Look every day for cuts or signs of infection, such as pain, swelling, redness, or warmth. If you see any of these signsespecially in someone who has diabetescall the doctor. Where can you learn more? Go to http://elizabeth-demetris.info/. Enter A726 in the search box to learn more about \"Learning About Foot and Toenail Care. \"  Current as of: March 17, 2017  Content Version: 11.3  © 4499-4570 [employer]. Care instructions adapted under license by [employer] (which disclaims liability or warranty for this information). If you have questions about a medical condition or this instruction, always ask your healthcare professional. [place] [address] any warranty or liability for your use of this information. Diabetes Foot Health: Care Instructions  Your Care Instructions    When you have diabetes, your feet need extra care and attention. Diabetes can damage the nerve endings and blood vessels in your feet, making you less likely to notice when your feet are injured.  Diabetes also limits your body's ability to fight infection and get blood to areas that need it. If you get a minor foot injury, it could become an ulcer or a serious infection. With good foot care, you can prevent most of these problems. Caring for your feet can be quick and easy. Most of the care can be done when you are bathing or getting ready for bed. Follow-up care is a key part of your treatment and safety. Be sure to make and go to all appointments, and call your doctor if you are having problems. Its also a good idea to know your test results and keep a list of the medicines you take. How can you care for yourself at home? · Keep your blood sugar close to normal by watching what and how much you eat, monitoring blood sugar, taking medicines if prescribed, and getting regular exercise. · Do not smoke. Smoking affects blood flow and can make foot problems worse. If you need help quitting, talk to your doctor about stop-smoking programs and medicines. These can increase your chances of quitting for good. · Eat a diet that is low in fats. High fat intake can cause fat to build up in your blood vessels and decrease blood flow. · Inspect your feet daily for blisters, cuts, cracks, or sores. If you cannot see well, use a mirror or have someone help you. · Take care of your feet:  [facility] AUTHORITY your feet every day. Use warm (not hot) water. Check the water temperature with your wrists or other part of your body, not your feet. ¨ Dry your feet well. Pat them dry. Do not rub the skin on your feet too hard. Dry well between your toes. If the skin on your feet stays moist, bacteria or a fungus can grow, which can lead to infection. ¨ Keep your skin soft. Use moisturizing skin cream to keep the skin on your feet soft and prevent calluses and cracks. But do not put the cream between your toes, and stop using any cream that causes a rash. ¨ Clean underneath your toenails carefully. Do not use a sharp object to clean underneath your toenails.  Use the blunt end of a nail file or other rounded tool. ¨ Trim and file your toenails straight across to prevent ingrown toenails. Use a nail clipper, not scissors. Use an emery board to smooth the edges. · Change socks daily. Socks without seams are best, because seams often rub the feet. You can find socks for people with diabetes from specialty catalogs. · Look inside your shoes every day for things like gravel or torn linings, which could cause blisters or sores. · Buy shoes that fit well:  ¨ Look for shoes that have plenty of space around the toes. This helps prevent bunions and blisters. ¨ Try on shoes while wearing the kind of socks you will usually wear with the shoes. ¨ Avoid plastic shoes. They may rub your feet and cause blisters. Good shoes should be made of materials that are flexible and breathable, such as leather or cloth. ¨ Break in new shoes slowly by wearing them for no more than an hour a day for several days. Take extra time to check your feet for red areas, blisters, or other problems after you wear new shoes. · Do not go barefoot. Do not wear sandals, and do not wear shoes with very thin soles. Thin soles are easy to puncture. They also do not protect your feet from hot pavement or cold weather. · Have your doctor check your feet during each visit. If you have a foot problem, see your doctor. Do not try to treat an early foot problem at home. Home remedies or treatments that you can buy without a prescription (such as corn removers) can be harmful. · Always get early treatment for foot problems. A minor irritation can lead to a major problem if not properly cared for early. When should you call for help? Call your doctor now or seek immediate medical care if:  · You have a foot sore, an ulcer or break in the skin that is not healing after 4 days, bleeding corns or calluses, or an ingrown toenail. · You have blue or black areas, which can mean bruising or blood flow problems.   · You have peeling skin or tiny blisters between your toes or cracking or oozing of the skin. · You have a fever for more than 24 hours and a foot sore. · You have new numbness or tingling in your feet that does not go away after you move your feet or change positions. · You have unexplained or unusual swelling of the foot or ankle. Watch closely for changes in your health, and be sure to contact your doctor if:  · You cannot do proper foot care. Where can you learn more? Go to http://elizabeth-demetris.info/. Enter A739 in the search box to learn more about \"Diabetes Foot Health: Care Instructions. \"  Current as of: March 13, 2017  Content Version: 11.3  © 3931-7453 [employer]. Care instructions adapted under license by [employer] (which disclaims liability or warranty for this information). If you have questions about a medical condition or this instruction, always ask your healthcare professional. [address] any warranty or liability for your use of this information.

## 2017-07-21 NOTE — PROGRESS NOTES
1. Have you been to the ER, urgent care clinic since your last visit? Hospitalized since your last visit? No    2. Have you seen or consulted any other health care providers outside of the [address] [place] [address] since your last visit? Include any pap smears or colon screening.  No

## 2017-07-21 NOTE — PROGRESS NOTES
Jayec Becker is a 78 y.o.  female and presents with    Chief Complaint   Patient presents with    Diabetes     Subjective:  Hypertension  Patient is here for follow-up of hypertension. She indicates that she is feeling well and denies any symptoms referable to her hypertension. She is not exercising and is adherent to low salt diet. Blood pressure is well controlled at home. Use of agents associated with hypertension: none. Diabetes Mellitus:  She has diabetes mellitus, and  hypertension, hyperlipidemia and obesity. Diabetic ROS - medication compliance: compliant all of the time, diabetic diet compliance: improved, home glucose monitoring: improved with increased fluid intake after ER evaluation. Lab review: labs reviewed, I note that glycosylated hemoglobin ordered         Patient Active Problem List   Diagnosis Code    DM (diabetes mellitus) ([facility] E11.9    Essential hypertension, benign I10    Morbid obesity ([facility] E66.01    Lymphoma in remission Z85.72    Advance care planning Z71.89     Patient Active Problem List    Diagnosis Date Noted    Advance care planning 07/06/2016    DM (diabetes mellitus) ([facility] 01/23/2014    Essential hypertension, benign 01/23/2014    Morbid obesity ([facility] 01/23/2014    Lymphoma in remission 01/23/2014     Current Outpatient Prescriptions   Medication Sig Dispense Refill    dilTIAZem XR (DILACOR XR) 180 mg XR capsule Take 1 Cap by mouth daily. 90 Cap 0    enalapril (VASOTEC) 20 mg tablet Take 1 Tab by mouth daily. 90 Tab 3    ergocalciferol (ERGOCALCIFEROL) 50,000 unit capsule Take 1 Cap by mouth every seven (7) days. 12 Cap 3    NOVOLOG FLEXPEN 100 unit/mL inpn INJECT  30 UNITS SUBCUTANEOUSLY IN THE MORNING  AND  20 UNITS AT NIGHT FOR DIABETES 45 mL 1    citalopram (CELEXA) 20 mg tablet Take 1 Tab by mouth daily.  Indications: major depressive disorder 30 Tab 11    canagliflozin (INVOKANA) 100 mg tablet Take 1 Tab by mouth Daily (before breakfast). 30 Tab 11    IBUPROFEN/DIPHENHYDRAMINE CIT (ADVIL PM PO) Take  by mouth.  ondansetron (ZOFRAN ODT) 4 mg disintegrating tablet Take 1 Tab by mouth every eight (8) hours as needed for Nausea. 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 No pertinent family history. Social History   Substance Use Topics    Smoking status: Former Smoker    Smokeless tobacco: Never Used    Alcohol use No       ROS   General ROS: negative for - chills or fever  Psychological ROS: negative for - anxiety or depression  Ophthalmic ROS: negative for - blurry vision  ENT ROS: negative for - headaches  Endocrine ROS: negative for - polydipsia/polyuria or temperature intolerance  Respiratory ROS: no cough, shortness of breath, or wheezing  Cardiovascular ROS: no chest pain or dyspnea on exertion  Gastrointestinal ROS: no abdominal pain, change in bowel habits, or black or bloody stools  Genito-Urinary ROS: no dysuria, trouble voiding, or hematuria  Musculoskeletal ROS: negative for - joint pain or muscle pain  Neurological ROS: negative for - numbness/tingling or weakness  Dermatological ROS: negative for - rash or skin lesion changes       All other systems reviewed and are negative.       Objective:  Vitals:    07/21/17 1042   BP: 136/72   Pulse: (!) 56   Resp: 16   Temp: 98 °F (36.7 °C)   TempSrc: Oral   SpO2: 95%   Weight: 208 lb 9.6 oz (94.6 kg)   Height: 5' 2\" (1.575 m)   PainSc:   0 - No pain     alert, well appearing, and in no distress, oriented to person, place, and time and obese  Mental status - normal mood, behavior, speech, dress, motor activity, and thought processes  Chest - clear to auscultation, no wheezes, rales or rhonchi, symmetric air entry  Heart - normal rate, regular rhythm, normal S1, S2, no murmurs, rubs, clicks or gallops  Neurological - guarded gait using walker for assistance  Extremities - peripheral pulses normal, no pedal edema, no clubbing or cyanosis  Skin - nails with onychomycosis; normal coloration and turgor, no rashes, no suspicious skin lesions noted    Diabetic foot exam:     Left: Filament test reduced sensation with micro filament   Pulse DP: 2+ (normal)   Deformities: None  Right: Filament test reduced sensation with micro filament   Pulse DP: 2+ (normal)   Deformities: None    LABS   hgb a1c 7.1    Assessment/Plan:    1. Type 2 diabetes mellitus with hyperglycemia, with long-term current use of insulin ([facility])  Goal hgb a1c <7; improved but borderline controlled; evaluate for retinopathy; encourage foot care  - AMB POC HEMOGLOBIN A1C  - AMB POC FUNDUS PHOTOGRAPHY WITH INTERP AND REPORT  - LIPID PANEL; Future  -  DIABETES FOOT EXAM    2. Essential hypertension with goal blood pressure less than 140/90  Well controlled with current medication    3. Vitamin D deficiency  Continue treatment    4. Need for hepatitis C screening test    - HEPATITIS C AB; Future    5. Diabetic polyneuropathy associated with diabetes mellitus due to underlying condition ([facility]  Foot care  -  DIABETES FOOT EXAM      Lab review: labs reviewed, I note that glycosylated hemoglobin mildly abnormal but acceptable      I have discussed the diagnosis with the patient and the intended plan as seen in the above orders. The patient has received an after-visit summary and questions were answered concerning future plans. I have discussed medication side effects and warnings with the patient as well.  I have reviewed the plan of care with the patient, accepted their input and they are in agreement with the treatment goals. Follow-up Disposition:  Return in about 6 months (around 1/21/2018) for diabetes.

## 2017-08-19 NOTE — ED PROVIDER NOTES
HPI Comments: Arcelia Curling is a 78year old female who presents to the ED after being brought in by her daughter. Pt states that earlier this morning, she rolled out of bed and hit the floor. She struck her right forehead and bilateral lower legs in the fall. Pt has a seizure Hx and takes Keppra at night to prevent seizures. States she did not lose consciousness during this event today, and the family reported that she was whispering out for help and they heard her, so as post ictal state is unlikely - lending credence to the fact that this was a fall and not a seizure. Pt has no other complaints of pain other than the aforementioned. Patient is a 78 y.o. female presenting with fall. The history is provided by the patient and a relative. History limited by: No communication barrier. Fall   Fall occurred: Pt states she fell out of bed. Distance fallen: unknown. She landed on carpet. Point of impact: Right forehead. Pain location: Bilateral lower legs & forehead. The pain is moderate. She was not ambulatory at the scene. There was no entrapment after the fall. There was no drug use involved in the accident. There was no alcohol use involved in the accident. Pertinent negatives include no loss of consciousness. The risk factors include being elderly (Mobility problems - uses walker). The symptoms are aggravated by activity. She has tried nothing for the symptoms. The treatment provided no relief. 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 No pertinent family history. Social History     Social History    Marital status: SINGLE     Spouse name: N/A    Number of children: N/A    Years of education: N/A     Occupational History    Not on file. Social History Main Topics    Smoking status: Former Smoker    Smokeless tobacco: Never Used    Alcohol use No    Drug use: No    Sexual activity: No     Other Topics Concern    Not on file     Social History Narrative         ALLERGIES: Latex, natural rubber and Iodine    Review of Systems   Constitutional: Negative. HENT: Negative. Eyes: Negative. Respiratory: Negative. Cardiovascular: Negative. Gastrointestinal: Negative. Endocrine: Negative. Genitourinary: Negative. Musculoskeletal: Positive for arthralgias (bialteral lower legs) and myalgias (bilateral lower legs). Skin: Positive for wound (forehead abrasion and hematoma). Allergic/Immunologic: Negative. Neurological: Negative. Negative for loss of consciousness. Psychiatric/Behavioral: Negative. Vitals:    08/19/17 0723   BP: 139/71   Pulse: (!) 59   Resp: 17   Temp: 98.4 °F (36.9 °C)   SpO2: 97%   Weight: 90.7 kg (200 lb)   Height: 5' (1.524 m)            Physical Exam   Constitutional: She is oriented to person, place, and time. She appears well-developed and well-nourished. No distress. HENT:   Head: Normocephalic and atraumatic. Eyes: EOM are normal. Pupils are equal, round, and reactive to light. Neck: Normal range of motion. Neck supple. Cardiovascular: Normal rate, regular rhythm, normal heart sounds and intact distal pulses. Pulmonary/Chest: Effort normal and breath sounds normal. No respiratory distress. She has no wheezes. She has no rales. Abdominal: Soft. Bowel sounds are normal. There is no tenderness. There is no rebound. Genitourinary:   Genitourinary Comments: NE   Musculoskeletal: Normal range of motion. Neurological: She is alert and oriented to person, place, and time. No cranial nerve deficit. Coordination normal.   Skin: Skin is warm and dry. Forehead hematoma with slight central abrasion     Psychiatric: She has a normal mood and affect.    Nursing note and vitals reviewed. MDM  Number of Diagnoses or Management Options  Fall, initial encounter:   Traumatic hematoma of forehead, initial encounter:      Amount and/or Complexity of Data Reviewed  Tests in the radiology section of CPT®: ordered and reviewed    Risk of Complications, Morbidity, and/or Mortality  Presenting problems: low  Diagnostic procedures: low  Management options: low    Patient Progress  Patient progress: stable    ED Course       Procedures    Diagnosis:   1. Traumatic hematoma of forehead, initial encounter    2. Fall, initial encounter          Disposition:   Discharged to Home. Follow-up Information     Follow up With Details Comments Irene Stafford MD Call on Monday to arrange follow up. [id number] [facility]  [address] [facility]  [phone]            Patient's Medications   Start Taking    No medications on file   Continue Taking    CANAGLIFLOZIN (INVOKANA) 100 MG TABLET    Take 1 Tab by mouth Daily (before breakfast). CITALOPRAM (CELEXA) 20 MG TABLET    Take 1 Tab by mouth daily. Indications: major depressive disorder    DILTIAZEM XR (DILACOR XR) 180 MG XR CAPSULE    Take 1 Cap by mouth daily. DUREZOL 0.05 % OPHTHALMIC EMULSION        ENALAPRIL (VASOTEC) 20 MG TABLET    Take 1 Tab by mouth daily. ERGOCALCIFEROL (ERGOCALCIFEROL) 50,000 UNIT CAPSULE    Take 1 Cap by mouth every seven (7) days. IBUPROFEN/DIPHENHYDRAMINE CIT (ADVIL PM PO)    Take  by mouth. ILEVRO 0.3 % DRPS        LEVETIRACETAM (KEPPRA XR) 750 MG ER TABLET    Take 1 Tab by Mouth Every Night at Bedtime. 3 MO SUPPLY    NOVOLOG FLEXPEN 100 UNIT/ML INPN    INJECT  30 UNITS SUBCUTANEOUSLY IN THE MORNING  AND  20 UNITS AT NIGHT FOR DIABETES    ONDANSETRON (ZOFRAN ODT) 4 MG DISINTEGRATING TABLET    Take 1 Tab by mouth every eight (8) hours as needed for Nausea.    These Medications have changed    No medications on file   Stop Taking    No medications on file Provider Attestation:    I was personally available for consultation in the emergency department. I have reviewed the chart prior to the patient being discharged and agree with the [facility], including the assessment, treatment plan, and disposition.      Kenney Warren MD

## 2017-08-19 NOTE — DISCHARGE INSTRUCTIONS
Head Injury: Care Instructions  Your Care Instructions  Most injuries to the head are minor. Bumps, cuts, and scrapes on the head and face usually heal well and can be treated the same as injuries to other parts of the body. Although it's rare, once in a while a more serious problem shows up after you are home. So it's good to be on the lookout for symptoms for a day or two. Follow-up care is a key part of your treatment and safety. Be sure to make and go to all appointments, and call your doctor if you are having problems. It's also a good idea to know your test results and keep a list of the medicines you take. How can you care for yourself at home? · Follow your doctor's instructions. He or she will tell you if you need someone to watch you closely for the next 24 hours or longer. · Take it easy for the next few days or more if you are not feeling well. · Ask your doctor when it's okay for you to go back to activities like driving a car, riding a bike, or operating machinery. When should you call for help? Call 911 anytime you think you may need emergency care. For example, call if:  · You have a seizure. · You passed out (lost consciousness). · You are confused or can't stay awake. Call your doctor now or seek immediate medical care if:  · You have new or worse vomiting. · You feel less alert. · You have new weakness or numbness in any part of your body. Watch closely for changes in your health, and be sure to contact your doctor if:  · You do not get better as expected. · You have new symptoms, such as headaches, trouble concentrating, or changes in mood. Where can you learn more? Go to http://elizabeth-demetris.info/. Enter C418 in the search box to learn more about \"Head Injury: Care Instructions. \"  Current as of: October 14, 2016  Content Version: 11.3  © 0906-4872 [employer].  Care instructions adapted under license by [employer] (which disclaims liability or warranty for this information). If you have questions about a medical condition or this instruction, always ask your healthcare professional. [address] any warranty or liability for your use of this information. [employer] Activation    Thank you for requesting access to [employer]. Please follow the instructions below to securely access and download your online medical record. [employer] allows you to send messages to your doctor, view your test results, renew your prescriptions, schedule appointments, and more. How Do I Sign Up? 1. In your internet browser, go to [email or web address]  2. Click on the First Time User? Click Here link in the Sign In box. You will be redirect to the New Member Sign Up page. 3. Enter your [employer] Access Code exactly as it appears below. You will not need to use this code after youve completed the sign-up process. If you do not sign up before the expiration date, you must request a new code. [employer] Access Code: Activation code not generated  Current [employer] Status: Active (This is the date your [employer] access code will )    4. Enter the last four digits of your Social Security Number (xxxx) and Date of Birth (mm/dd/yyyy) as indicated and click Submit. You will be taken to the next sign-up page. 5. Create a [employer] ID. This will be your [employer] login ID and cannot be changed, so think of one that is secure and easy to remember. 6. Create a [employer] password. You can change your password at any time. 7. Enter your Password Reset Question and Answer. This can be used at a later time if you forget your password. 8. Enter your e-mail address. You will receive e-mail notification when new information is available in [address] 9. Click Sign Up. You can now view and download portions of your medical record. 10. Click the Download Summary menu link to download a portable copy of your medical information.     Additional Information    If you have questions, please visit the Frequently Asked Questions section of the MyChart website at [email or web address] Remember, MyChart is NOT to be used for urgent needs. For medical emergencies, dial 911. Apply Ice to the affected area three times daily for 5 to 10 minutes at a time. Follow up with Dr Lana Wheeler for further evaluation and treatment. If symptoms worsen, return top the ER at once. May take Tylenol as needed for discomfort.

## 2017-08-19 NOTE — ED NOTES
I have reviewed discharge instructions with the patient and daughter. The patient and caregiver verbalized understanding.

## 2017-08-19 NOTE — ED TRIAGE NOTES
Found on floor this morning after being on floor x 30 min. Right temporal bump with bruising with small cut.

## 2017-10-13 NOTE — PATIENT INSTRUCTIONS
Learning About Peripheral Arterial Disease of the Legs  What is peripheral arterial disease? Peripheral arterial disease (PAD) is narrowing or blockage of arteries in your arms and legs. The most common cause of PAD is the buildup of plaque on the inside of arteries. Plaque is made of extra cholesterol, calcium, and other material in your blood. Over time, plaque builds up in the walls of the arteries, including those that supply blood to your legs. This buildup leads to poor blood flow. When you have PAD, you have a risk of having plaque in other arteries in your body. This raises your risk of a heart attack and stroke. This information focuses on peripheral arterial disease of the legs, the area where it is most common. When you have PAD of the legs and you walk or exercise, your leg muscles do not get enough blood, and you can get painful cramps. The cramps are called intermittent claudication. Peripheral arterial disease is also called peripheral vascular disease. What are the symptoms? Many people who have PAD do not have any symptoms. But if you have symptoms, you may have a tight, aching, or squeezing pain in the calf, thigh, or buttock. This pain usually happens after you have walked a certain distance. The pain goes away if you stop walking. As PAD gets worse, you may have pain in your foot or toe when you are not walking. You also may have symptoms that you can see, such as:  · Feet and toes that become pale from exercise or when elevated. · Loss of hair on the feet and toes. · Feet that turn red when dangled. · Blue or purple marks on the legs, feet, or toes. · Sores on the feet or toes. · Discolored or black skin on the legs or feet. This is a sign of gangrene (death of tissue). How can you prevent PAD? · Quit smoking. Quitting smoking is one of the best things you can do to help prevent PAD. If you need help quitting, talk to your doctor about stop-smoking programs and medicines. These can increase your chances of quitting for good. · Stay at a healthy weight. · Manage other health problems, including diabetes, high blood pressure, and high cholesterol. · Be physically active. Get at least 30 minutes of exercise on most days of the week. Walking is a good choice. You also may want to do other activities, such as running, swimming, cycling, or playing tennis or team sports. · Eat a variety of heart-healthy foods. ¨ Eat fruits, vegetables, whole grains (like oatmeal), dried beans and peas, nuts and seeds, soy products (like tofu), and fat-free or low-fat dairy products. ¨ Replace butter, margarine, and hydrogenated or partially hydrogenated oils with olive and canola oils. (Canola oil margarine without trans fat is fine.)  ¨ Replace red meat with fish, poultry, and soy protein (like tofu). ¨ Limit processed and packaged foods like chips, crackers, and cookies. How is PAD treated? Your doctor may suggest ways to relieve symptoms and lower your risk of heart attack and stroke. These may include:  · Quitting smoking. It's one of the most important things you can do. If you need help quitting, talk to your doctor about stop-smoking programs and medicines. These can increase your chances of quitting for good. · Eating heart-healthy foods. · Staying at a healthy weight. Lose weight if you need to. · Regular exercise. Your doctor may suggest a program that includes walking and weight training exercises. You might try a supervised program or try it at home. Your goal is to be able to walk farther without pain. · Medicines that help manage other problems such as high blood pressure and high cholesterol. · Medicine, such as aspirin, that prevents blood clots which could cause a heart attack or stroke. · Procedures, such as opening narrowed or blocked arteries (angioplasty) or using healthy blood vessels to create detours around narrowed or blocked arteries (bypass surgery).   Follow-up care is a key part of your treatment and safety. Be sure to make and go to all appointments, and call your doctor if you are having problems. It's also a good idea to know your test results and keep a list of the medicines you take. Where can you learn more? Go to http://elizabeth-demetris.info/. Enter W578 in the search box to learn more about \"Learning About Peripheral Arterial Disease of the Legs. \"  Current as of: June 4, 2016  Content Version: 11.3  © 8427-7774 [employer]. Care instructions adapted under license by [employer] (which disclaims liability or warranty for this information). If you have questions about a medical condition or this instruction, always ask your healthcare professional. [address] any warranty or liability for your use of this information.

## 2017-10-13 NOTE — PROGRESS NOTES
Meron Sarmiento is a 75 y.o. female  Chief Complaint   Patient presents with    Medication Evaluation     1. Have you been to the ER, urgent care clinic since your last visit? Hospitalized since your last visit? No    2. Have you seen or consulted any other health care providers outside of the [address] [place] [address] since your last visit? Include any pap smears or colon screening.  No

## 2017-10-13 NOTE — MR AVS SNAPSHOT
Visit Information Date & Time Provider Department Dept. Phone Encounter #  
 10/13/2017  2:00 PM Joon Sen, [address] [facility] [phone] Follow-up Instructions Return in about 3 months (around 1/13/2018) for diabetes. Your Appointments 1/19/2018 10:15 AM  
Follow Up with Joon Sen MD  
[address] [address]) Appt Note: 6 months (around 1/21/2018) for diabetes. [address] [facility] 83 [address]  
  
   
 [address] [place] [address] [address] Upcoming Health Maintenance Date Due Hepatitis C Screening 1950 COLONOSCOPY 10/3/1967 ZOSTER VACCINE AGE 60> 8/3/2009 MICROALBUMIN Q1 2/19/2017 LIPID PANEL Q1 2/19/2017 INFLUENZA AGE 9 TO ADULT 8/1/2017 HEMOGLOBIN A1C Q6M 1/21/2018 MEDICARE YEARLY EXAM 4/15/2018 GLAUCOMA SCREENING Q2Y 7/20/2018 FOOT EXAM Q1 7/21/2018 EYE EXAM RETINAL OR DILATED Q1 7/21/2018 BREAST CANCER SCRN MAMMOGRAM 3/31/2019 DTaP/Tdap/Td series (2 - Td) 7/6/2026 Allergies as of 10/13/2017  Review Complete On: 10/13/2017 By: Jeanmarie Parra LPN Severity Noted Reaction Type Reactions Latex, Natural Rubber  07/30/2014    Other (comments) Swelling in hands and lips Iodine High 01/23/2014    Anaphylaxis, Swelling Current Immunizations  Reviewed on 9/30/2016 Name Date Influenza High Dose Vaccine PF 10/13/2017  2:44 PM, 9/30/2016 10:03 AM  
 Pneumococcal Conjugate (PCV-13) 9/30/2016 10:03 AM  
 Pneumococcal Polysaccharide (PPSV-23) 10/13/2017  2:44 PM  
  
 Not reviewed this visit You Were Diagnosed With   
  
 Codes Comments Type 2 diabetes mellitus with hyperglycemia, with long-term current use of insulin (HCC)    -  Primary ICD-10-CM: E11.65, Z79.4 ICD-9-CM: 250.00, 790.29, V58.67 Essential hypertension with goal blood pressure less than 140/90     ICD-10-CM: I10 
ICD-9-CM: 401.9 Diabetic polyneuropathy associated with diabetes mellitus due to underlying condition ([facility]     ICD-10-CM: N56.12 
ICD-9-CM: 249.60, 357.2 Colon cancer screening     ICD-10-CM: Z12.11 ICD-9-CM: V76.51 Needs flu shot     ICD-10-CM: U55 ICD-9-CM: V04.81 Encounter for immunization     ICD-10-CM: C09 ICD-9-CM: V03.89 Peripheral artery disease ([facility]     ICD-10-CM: I73.9 ICD-9-CM: 443.9 Early onset Alzheimer's dementia without behavioral disturbance     ICD-10-CM: G30.0, F02.80 ICD-9-CM: 331.0, 294.10 Vitals BP Pulse Temp Resp Height(growth percentile) Weight(growth percentile) 130/62 (BP 1 Location: Left arm, BP Patient Position: Sitting) 69 97.1 °F (36.2 °C) (Oral) 16 5' (1.524 m) 203 lb (92.1 kg) SpO2 BMI OB Status Smoking Status 94% 39.65 kg/m2 Hysterectomy Former Smoker Vitals History BMI and BSA Data Body Mass Index Body Surface Area  
 39.65 kg/m 2 1.97 m 2 Preferred Pharmacy Pharmacy Name Phone [address] 7524 [facility] [address] [phone] Your Updated Medication List  
  
   
This list is accurate as of: 10/13/17  2:45 PM.  Always use your most recent med list. ADVIL PM PO Take  by mouth. aspirin delayed-release 81 mg tablet Take 1 Tab by mouth daily. canagliflozin 100 mg tablet Commonly known as:  Terisa Millet Take 1 Tab by mouth Daily (before breakfast). citalopram 20 mg tablet Commonly known as:  Anu Letters Take 1 Tab by mouth daily. Indications: major depressive disorder  
  
 dilTIAZem  mg XR capsule Commonly known as:  DILACOR XR Take 1 Cap by mouth daily. donepezil 10 mg tablet Commonly known as:  ARICEPT Take 10 mg by mouth nightly. DUREZOL 0.05 % ophthalmic emulsion Generic drug:  Difluprednate  
  
 enalapril 20 mg tablet Commonly known as:  Ava Sensor Take 1 Tab by mouth daily. ergocalciferol 50,000 unit capsule Commonly known as:  ERGOCALCIFEROL Take 1 Cap by mouth every seven (7) days. ILEVRO 0.3 % Drps Generic drug:  nepafenac  
  
 levETIRAcetam 750 mg ER tablet Commonly known as:  KEPPRA XR Take 1 Tab by Mouth Every Night at Bedtime. NovoLOG Flexpen 100 unit/mL Inpn Generic drug:  insulin aspart INJECT  30 UNITS SUBCUTANEOUSLY IN THE MORNING  AND  20 UNITS AT NIGHT FOR DIABETES  
  
 ondansetron 4 mg disintegrating tablet Commonly known as:  ZOFRAN ODT Take 1 Tab by mouth every eight (8) hours as needed for Nausea. Prescriptions Sent to Pharmacy Refills  
 aspirin delayed-release 81 mg tablet 1 Sig: Take 1 Tab by mouth daily. Class: Normal  
 Pharmacy: [address] [place] [address], [address] [place] [address] Ph #: [phone] Route: Oral  
  
We Performed the Following ADMIN INFLUENZA VIRUS VAC [ [facility]PCS] ADMIN PNEUMOCOCCAL VACCINE [ HCPCS] INFLUENZA VIRUS VACCINE, HIGH DOSE SEASONAL, PRESERVATIVE FREE [39425 CPT(R)] PNEUMOCOCCAL POLYSACCHARIDE VACCINE, 23-VALENT, ADULT OR IMMUNOSUPPRESSED PT DOSE, [09531 CPT(R)] Follow-up Instructions Return in about 3 months (around 1/13/2018) for diabetes. To-Do List   
 11/12/2017 Lab:  OCCULT BLOOD, IMMUNOASSAY (FIT) Patient Instructions Learning About Peripheral Arterial Disease of the Legs What is peripheral arterial disease? Peripheral arterial disease (PAD) is narrowing or blockage of arteries in your arms and legs. The most common cause of PAD is the buildup of plaque on the inside of arteries. Plaque is made of extra cholesterol, calcium, and other material in your blood. Over time, plaque builds up in the walls of the arteries, including those that supply blood to your legs. This buildup leads to poor blood flow.  When you have PAD, you have a risk of having plaque in other arteries in your body. This raises your risk of a heart attack and stroke. This information focuses on peripheral arterial disease of the legs, the area where it is most common. When you have PAD of the legs and you walk or exercise, your leg muscles do not get enough blood, and you can get painful cramps. The cramps are called intermittent claudication. Peripheral arterial disease is also called peripheral vascular disease. What are the symptoms? Many people who have PAD do not have any symptoms. But if you have symptoms, you may have a tight, aching, or squeezing pain in the calf, thigh, or buttock. This pain usually happens after you have walked a certain distance. The pain goes away if you stop walking. As PAD gets worse, you may have pain in your foot or toe when you are not walking. You also may have symptoms that you can see, such as: · Feet and toes that become pale from exercise or when elevated. · Loss of hair on the feet and toes. · Feet that turn red when dangled. · Blue or purple marks on the legs, feet, or toes. · Sores on the feet or toes. · Discolored or black skin on the legs or feet. This is a sign of gangrene (death of tissue). How can you prevent PAD? · Quit smoking. Quitting smoking is one of the best things you can do to help prevent PAD. If you need help quitting, talk to your doctor about stop-smoking programs and medicines. These can increase your chances of quitting for good. · Stay at a healthy weight. · Manage other health problems, including diabetes, high blood pressure, and high cholesterol. · Be physically active. Get at least 30 minutes of exercise on most days of the week. Walking is a good choice. You also may want to do other activities, such as running, swimming, cycling, or playing tennis or team sports. · Eat a variety of heart-healthy foods.  
¨ Eat fruits, vegetables, whole grains (like oatmeal), dried beans and peas, nuts and seeds, soy products (like tofu), and fat-free or low-fat dairy products. ¨ Replace butter, margarine, and hydrogenated or partially hydrogenated oils with olive and canola oils. (Canola oil margarine without trans fat is fine.) ¨ Replace red meat with fish, poultry, and soy protein (like tofu). ¨ Limit processed and packaged foods like chips, crackers, and cookies. How is PAD treated? Your doctor may suggest ways to relieve symptoms and lower your risk of heart attack and stroke. These may include: · Quitting smoking. It's one of the most important things you can do. If you need help quitting, talk to your doctor about stop-smoking programs and medicines. These can increase your chances of quitting for good. · Eating heart-healthy foods. · Staying at a healthy weight. Lose weight if you need to. · Regular exercise. Your doctor may suggest a program that includes walking and weight training exercises. You might try a supervised program or try it at home. Your goal is to be able to walk farther without pain. · Medicines that help manage other problems such as high blood pressure and high cholesterol. · Medicine, such as aspirin, that prevents blood clots which could cause a heart attack or stroke. · Procedures, such as opening narrowed or blocked arteries (angioplasty) or using healthy blood vessels to create detours around narrowed or blocked arteries (bypass surgery). Follow-up care is a key part of your treatment and safety. Be sure to make and go to all appointments, and call your doctor if you are having problems. It's also a good idea to know your test results and keep a list of the medicines you take. Where can you learn more? Go to http://elizabeth-demetris.info/. Enter J034 in the search box to learn more about \"Learning About Peripheral Arterial Disease of the Legs. \" Current as of: June 4, 2016 Content Version: 11.3 © 6289-3361 Healthwise, Incorporated. Care instructions adapted under license by [employer] (which disclaims liability or warranty for this information). If you have questions about a medical condition or this instruction, always ask your healthcare professional. [address] any warranty or liability for your use of this information. Introducing [facility]! Dear Aleida Walters: Thank you for requesting a [employer] account. Our records indicate that you already have an active [employer] account. You can access your account anytime at [email or web address] Did you know that you can access your hospital and ER discharge instructions at any time in [employer]? You can also review all of your test results from your hospital stay or ER visit. Additional Information If you have questions, please visit the Frequently Asked Questions section of the [employer] website at [email or web address] Remember, [employer] is NOT to be used for urgent needs. For medical emergencies, dial 911. Now available from your iPhone and Android! Please provide this summary of care documentation to your next provider. Your primary care clinician is listed as Lisa Mcfarlane. If you have any questions after today's visit, please call [phone].

## 2017-10-13 NOTE — PROGRESS NOTES
Sixto Bennett is a 75 y.o.  female and presents with    Chief Complaint   Patient presents with    Medication Evaluation     Subjective:  Mrs. Mo Turner presents with her daughter for request for colon cancer screening. She is also here for evaluation of diabetes. Diabetes Mellitus:  She has diabetes mellitus, and  hypertension, hyperlipidemia and obesity. Diabetic ROS - medication compliance: compliant all of the time, diabetic diet compliance: noncompliant some of the time, home glucose monitoring: is performed sporadically. Lab review: labs reviewed, I note that glycosylated hemoglobin mildly abnormal but acceptable. Patient Active Problem List   Diagnosis Code    DM (diabetes mellitus) ([facility] E11.9    Essential hypertension, benign I10    Morbid obesity ([facility] E66.01    Lymphoma in remission ([facility] C85.90    Advance care planning Z71.89     Patient Active Problem List    Diagnosis Date Noted    Advance care planning 07/06/2016    DM (diabetes mellitus) ([facility] 01/23/2014    Essential hypertension, benign 01/23/2014    Morbid obesity ([facility] 01/23/2014    Lymphoma in remission ([facility] 01/23/2014     Current Outpatient Prescriptions   Medication Sig Dispense Refill    donepezil (ARICEPT) 10 mg tablet Take 10 mg by mouth nightly.  levETIRAcetam (KEPPRA XR) 750 mg ER tablet Take 1 Tab by Mouth Every Night at Bedtime. 90 Tab 1    dilTIAZem XR (DILACOR XR) 180 mg XR capsule Take 1 Cap by mouth daily. 90 Cap 0    enalapril (VASOTEC) 20 mg tablet Take 1 Tab by mouth daily. 90 Tab 3    ergocalciferol (ERGOCALCIFEROL) 50,000 unit capsule Take 1 Cap by mouth every seven (7) days. 12 Cap 3    NOVOLOG FLEXPEN 100 unit/mL inpn INJECT  30 UNITS SUBCUTANEOUSLY IN THE MORNING  AND  20 UNITS AT NIGHT FOR DIABETES 45 mL 1    citalopram (CELEXA) 20 mg tablet Take 1 Tab by mouth daily.  Indications: major depressive disorder 30 Tab 11    canagliflozin (INVOKANA) 100 mg tablet Take 1 Tab by mouth Daily (before breakfast). 30 Tab 11    ILEVRO 0.3 % drps       ondansetron (ZOFRAN ODT) 4 mg disintegrating tablet Take 1 Tab by mouth every eight (8) hours as needed for Nausea. 20 Tab 0    DUREZOL 0.05 % ophthalmic emulsion       IBUPROFEN/DIPHENHYDRAMINE CIT (ADVIL PM PO) Take  by mouth. 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 Social History   Substance Use Topics    Smoking status: Former Smoker    Smokeless tobacco: Never Used    Alcohol use No       ROS   General ROS: negative for - chills or fever  Psychological ROS: negative for - anxiety or depression  Ophthalmic ROS: negative for - blurry vision  ENT ROS: negative for - headaches, nasal congestion or sore throat  Endocrine ROS: negative for - temperature intolerance  Respiratory ROS: no cough, shortness of breath, or wheezing  Cardiovascular ROS: no chest pain or dyspnea on exertion  Gastrointestinal ROS: no abdominal pain, change in bowel habits, or black or bloody stools  Genito-Urinary ROS: no dysuria, trouble voiding, or hematuria  Musculoskeletal ROS: negative for - joint pain or muscle pain  Neurological ROS: no TIA or stroke symptoms  Dermatological ROS: negative for - rash or skin lesion changes    All other systems reviewed and are negative.       Objective:Vitals:    10/13/17 1341   BP: 130/62   Pulse: 69   Resp: 16   Temp: 97.1 °F (36.2 °C)   TempSrc: Oral   SpO2: 94%   Weight: 203 lb (92.1 kg)   Height: 5' (1.524 m)   PainSc:   0 - No pain       alert, well appearing, and in no distress, oriented to person, place, and time and obese  Mental status - normal mood, behavior, speech, dress, motor activity, and thought processes  Chest - clear to auscultation, no wheezes, rales or rhonchi, symmetric air entry  Heart - normal rate, regular rhythm, normal S1, S2, no murmurs, rubs, clicks or gallops  Neurological - cranial nerves II through XII intact, wide based gait and station  Skin - NAILS: onychomycosis of the toenails and overgrowth    Assessment/Plan:    1. Type 2 diabetes mellitus with hyperglycemia, with long-term current use of insulin ([facility])  Goal hgb a1c <7; borderline controlled; family is working well with Ms. Braulio Gtz to keep her glucose controlled; continue current treatment  2. Essential hypertension with goal blood pressure less than 140/90  Continue current medication with good control    3. Diabetic polyneuropathy associated with diabetes mellitus due to underlying condition ([facility]  Foot care    4. Colon cancer screening  Concern for risk associated with colonoscopy; check stool for blood which is acceptable screening  - OCCULT BLOOD, IMMUNOASSAY (FIT); Future    5. Needs flu shot    - ADMIN INFLUENZA VIRUS VAC  - INFLUENZA VIRUS VACCINE, HIGH DOSE SEASONAL, PRESERVATIVE FREE    6. Encounter for immunization    - ADMIN PNEUMOCOCCAL VACCINE  - Pneumococcal Polysac (PPSV) 23-Valent    7. Peripheral artery disease (HCC)  Continue ASA therapy  - aspirin delayed-release 81 mg tablet; Take 1 Tab by mouth daily. Dispense: 90 Tab; Refill: 1      Lab review: no lab studies available for review at time of visit      I have discussed the diagnosis with the patient and the intended plan as seen in the above orders. The patient has received an after-visit summary and questions were answered concerning future plans. I have discussed medication side effects and warnings with the patient as well. I have reviewed the plan of care with the patient, accepted their input and they are in agreement with the treatment goals.      Follow-up Disposition:  Return in about 3 months (around 1/13/2018) for diabetes.

## 2017-10-16 NOTE — TELEPHONE ENCOUNTER
From: [place] [facility]  To: Estelita Campbell MD  Sent: 10/14/2017 6:17 AM EDT  Subject: Medication Renewal Request    Original authorizing provider: MD Mallika Ballard Alert would like a refill of the following medications:  dilTIAZem XR (DILACOR XR) 180 mg XR capsule Estelita Campbell MD]    Preferred pharmacy: [facility], [address]    Comment:

## 2017-10-17 NOTE — TELEPHONE ENCOUNTER
[employer] auth Approved. NQOD#[id number]  10/17/2017 to 10/17/2018  Faxed to [phone], this encounter will be closed.

## 2018-01-01 ENCOUNTER — HOME CARE VISIT (OUTPATIENT)
Dept: SCHEDULING | Facility: HOME HEALTH | Age: 68
End: 2018-01-01
Payer: MEDICARE

## 2018-01-01 ENCOUNTER — DOCUMENTATION ONLY (OUTPATIENT)
Dept: FAMILY MEDICINE CLINIC | Age: 68
End: 2018-01-01

## 2018-01-01 ENCOUNTER — APPOINTMENT (OUTPATIENT)
Dept: MRI IMAGING | Age: 68
DRG: 064 | End: 2018-01-01
Attending: HOSPITALIST
Payer: MEDICARE

## 2018-01-01 ENCOUNTER — PATIENT OUTREACH (OUTPATIENT)
Dept: FAMILY MEDICINE CLINIC | Age: 68
End: 2018-01-01

## 2018-01-01 ENCOUNTER — APPOINTMENT (OUTPATIENT)
Dept: MRI IMAGING | Age: 68
DRG: 064 | End: 2018-01-01
Attending: EMERGENCY MEDICINE
Payer: MEDICARE

## 2018-01-01 ENCOUNTER — HOME CARE VISIT (OUTPATIENT)
Dept: HOME HEALTH SERVICES | Facility: HOME HEALTH | Age: 68
End: 2018-01-01
Payer: MEDICARE

## 2018-01-01 ENCOUNTER — HOME CARE VISIT (OUTPATIENT)
Dept: HOME HEALTH SERVICES | Facility: HOME HEALTH | Age: 68
End: 2018-01-01

## 2018-01-01 ENCOUNTER — TELEPHONE (OUTPATIENT)
Dept: FAMILY MEDICINE CLINIC | Age: 68
End: 2018-01-01

## 2018-01-01 ENCOUNTER — APPOINTMENT (OUTPATIENT)
Dept: GENERAL RADIOLOGY | Age: 68
DRG: 064 | End: 2018-01-01
Attending: HOSPITALIST
Payer: MEDICARE

## 2018-01-01 ENCOUNTER — HOSPITAL ENCOUNTER (INPATIENT)
Dept: RADIATION THERAPY | Age: 68
Discharge: HOME | DRG: 064 | End: 2018-03-07
Payer: MEDICARE

## 2018-01-01 ENCOUNTER — OFFICE VISIT (OUTPATIENT)
Dept: FAMILY MEDICINE CLINIC | Age: 68
End: 2018-01-01

## 2018-01-01 ENCOUNTER — HOME HEALTH ADMISSION (OUTPATIENT)
Dept: HOME HEALTH SERVICES | Facility: HOME HEALTH | Age: 68
End: 2018-01-01
Payer: MEDICARE

## 2018-01-01 ENCOUNTER — APPOINTMENT (OUTPATIENT)
Dept: CT IMAGING | Age: 68
DRG: 064 | End: 2018-01-01
Attending: EMERGENCY MEDICINE
Payer: MEDICARE

## 2018-01-01 ENCOUNTER — HOSPITAL ENCOUNTER (INPATIENT)
Age: 68
LOS: 6 days | Discharge: HOME | DRG: 064 | End: 2018-03-12
Attending: EMERGENCY MEDICINE | Admitting: HOSPITALIST
Payer: MEDICARE

## 2018-01-01 ENCOUNTER — APPOINTMENT (OUTPATIENT)
Dept: GENERAL RADIOLOGY | Age: 68
DRG: 064 | End: 2018-01-01
Attending: EMERGENCY MEDICINE
Payer: MEDICARE

## 2018-01-01 VITALS
TEMPERATURE: 96.6 F | HEART RATE: 69 BPM | SYSTOLIC BLOOD PRESSURE: 138 MMHG | WEIGHT: 198 LBS | DIASTOLIC BLOOD PRESSURE: 62 MMHG | HEIGHT: 60 IN | OXYGEN SATURATION: 95 % | RESPIRATION RATE: 18 BRPM | BODY MASS INDEX: 38.87 KG/M2

## 2018-01-01 VITALS
RESPIRATION RATE: 17 BRPM | TEMPERATURE: 96.3 F | HEART RATE: 78 BPM | SYSTOLIC BLOOD PRESSURE: 166 MMHG | DIASTOLIC BLOOD PRESSURE: 76 MMHG | WEIGHT: 194 LBS | BODY MASS INDEX: 38.09 KG/M2 | HEIGHT: 60 IN | OXYGEN SATURATION: 97 %

## 2018-01-01 VITALS
HEART RATE: 63 BPM | TEMPERATURE: 98.7 F | DIASTOLIC BLOOD PRESSURE: 69 MMHG | SYSTOLIC BLOOD PRESSURE: 142 MMHG | OXYGEN SATURATION: 97 %

## 2018-01-01 VITALS
DIASTOLIC BLOOD PRESSURE: 70 MMHG | SYSTOLIC BLOOD PRESSURE: 124 MMHG | HEART RATE: 71 BPM | OXYGEN SATURATION: 97 % | TEMPERATURE: 100.1 F

## 2018-01-01 VITALS
SYSTOLIC BLOOD PRESSURE: 126 MMHG | HEART RATE: 74 BPM | TEMPERATURE: 98.1 F | OXYGEN SATURATION: 93 % | DIASTOLIC BLOOD PRESSURE: 70 MMHG

## 2018-01-01 VITALS
HEART RATE: 55 BPM | OXYGEN SATURATION: 97 % | DIASTOLIC BLOOD PRESSURE: 70 MMHG | TEMPERATURE: 97.9 F | SYSTOLIC BLOOD PRESSURE: 140 MMHG | RESPIRATION RATE: 16 BRPM

## 2018-01-01 VITALS
HEART RATE: 78 BPM | SYSTOLIC BLOOD PRESSURE: 143 MMHG | HEIGHT: 60 IN | BODY MASS INDEX: 38.21 KG/M2 | TEMPERATURE: 97.1 F | OXYGEN SATURATION: 97 % | RESPIRATION RATE: 16 BRPM | DIASTOLIC BLOOD PRESSURE: 73 MMHG | WEIGHT: 194.6 LBS

## 2018-01-01 VITALS
WEIGHT: 196.87 LBS | SYSTOLIC BLOOD PRESSURE: 162 MMHG | BODY MASS INDEX: 36.23 KG/M2 | RESPIRATION RATE: 15 BRPM | HEART RATE: 60 BPM | OXYGEN SATURATION: 96 % | TEMPERATURE: 98 F | HEIGHT: 62 IN | DIASTOLIC BLOOD PRESSURE: 83 MMHG

## 2018-01-01 DIAGNOSIS — E11.65 TYPE 2 DIABETES MELLITUS WITH HYPERGLYCEMIA, WITHOUT LONG-TERM CURRENT USE OF INSULIN (HCC): ICD-10-CM

## 2018-01-01 DIAGNOSIS — Z71.89 ADVANCE CARE PLANNING: ICD-10-CM

## 2018-01-01 DIAGNOSIS — F03.90 DEMENTIA WITHOUT BEHAVIORAL DISTURBANCE, UNSPECIFIED DEMENTIA TYPE: ICD-10-CM
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
## 2018-01-01 DIAGNOSIS — J02.9 SORE THROAT: Primary | ICD-10-CM

## 2018-01-01 DIAGNOSIS — F01.50 VASCULAR DEMENTIA WITHOUT BEHAVIORAL DISTURBANCE (HCC): ICD-10-CM

## 2018-01-01 DIAGNOSIS — F02.80 DEMENTIA ASSOCIATED WITH OTHER UNDERLYING DISEASE WITHOUT BEHAVIORAL DISTURBANCE (HCC): ICD-10-CM

## 2018-01-01 DIAGNOSIS — S06.320A INTRAPARENCHYMAL HEMATOMA OF BRAIN, LEFT, WITHOUT LOSS OF CONSCIOUSNESS, INITIAL ENCOUNTER (HCC): ICD-10-CM

## 2018-01-01 DIAGNOSIS — R11.2 NON-INTRACTABLE VOMITING WITH NAUSEA, UNSPECIFIED VOMITING TYPE: Primary | ICD-10-CM

## 2018-01-01 DIAGNOSIS — R13.10 DYSPHAGIA, UNSPECIFIED TYPE: Primary | ICD-10-CM

## 2018-01-01 DIAGNOSIS — E55.9 VITAMIN D DEFICIENCY: ICD-10-CM
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
## 2018-01-01 DIAGNOSIS — R26.81 UNSTABLE GAIT: ICD-10-CM

## 2018-01-01 DIAGNOSIS — R53.81 DEBILITY: ICD-10-CM

## 2018-01-01 DIAGNOSIS — Z74.2 NEED FOR HOME HEALTH CARE: ICD-10-CM

## 2018-01-01 DIAGNOSIS — K59.01 SLOW TRANSIT CONSTIPATION: Primary | ICD-10-CM

## 2018-01-01 DIAGNOSIS — M25.551 RIGHT HIP PAIN: Primary | ICD-10-CM

## 2018-01-01 LAB
ABO + RH BLD: NORMAL
ALBUMIN SERPL-MCNC: 3.3 G/DL (ref 3.4–5)
ALBUMIN SERPL-MCNC: 3.3 G/DL (ref 3.4–5)
ALBUMIN SERPL-MCNC: 3.4 G/DL (ref 3.4–5)
ALBUMIN SERPL-MCNC: 3.4 G/DL (ref 3.4–5)
ALBUMIN SERPL-MCNC: 3.5 G/DL (ref 3.4–5)
ALBUMIN SERPL-MCNC: 3.6 G/DL (ref 3.4–5)
ALBUMIN SERPL-MCNC: 3.9 G/DL (ref 3.4–5)
ALBUMIN/GLOB SERPL: 0.9 {RATIO} (ref 0.8–1.7)
ALBUMIN/GLOB SERPL: 0.9 {RATIO} (ref 0.8–1.7)
ALBUMIN/GLOB SERPL: 1 {RATIO} (ref 0.8–1.7)
ALP SERPL-CCNC: 61 U/L (ref 45–117)
ALP SERPL-CCNC: 63 U/L (ref 45–117)
ALP SERPL-CCNC: 65 U/L (ref 45–117)
ALP SERPL-CCNC: 67 U/L (ref 45–117)
ALP SERPL-CCNC: 67 U/L (ref 45–117)
ALP SERPL-CCNC: 70 U/L (ref 45–117)
ALP SERPL-CCNC: 78 U/L (ref 45–117)
ALT SERPL-CCNC: 14 U/L (ref 13–56)
ALT SERPL-CCNC: 15 U/L (ref 13–56)
ALT SERPL-CCNC: 16 U/L (ref 13–56)
ALT SERPL-CCNC: 17 U/L (ref 13–56)
ALT SERPL-CCNC: 19 U/L (ref 13–56)
AMMONIA PLAS-SCNC: 13 UMOL/L (ref 11–32)
AMMONIA PLAS-SCNC: 15 UMOL/L (ref 11–32)
AMMONIA PLAS-SCNC: 45 UMOL/L (ref 11–32)
AMPHET UR QL SCN: NEGATIVE
ANION GAP SERPL CALC-SCNC: 11 MMOL/L (ref 3–18)
ANION GAP SERPL CALC-SCNC: 6 MMOL/L (ref 3–18)
ANION GAP SERPL CALC-SCNC: 7 MMOL/L (ref 3–18)
ANION GAP SERPL CALC-SCNC: 7 MMOL/L (ref 3–18)
ANION GAP SERPL CALC-SCNC: 8 MMOL/L (ref 3–18)
ANION GAP SERPL CALC-SCNC: 9 MMOL/L (ref 3–18)
APPEARANCE UR: ABNORMAL
APTT PPP: 24.7 SEC (ref 23–36.4)
APTT PPP: 24.8 SEC (ref 23–36.4)
APTT PPP: 25.2 SEC (ref 23–36.4)
APTT PPP: 26.7 SEC (ref 23–36.4)
APTT PPP: 26.7 SEC (ref 23–36.4)
APTT PPP: 28 SEC (ref 23–36.4)
APTT PPP: 28.1 SEC (ref 23–36.4)
APTT PPP: 28.1 SEC (ref 23–36.4)
AST SERPL-CCNC: 10 U/L (ref 15–37)
AST SERPL-CCNC: 11 U/L (ref 15–37)
AST SERPL-CCNC: 18 U/L (ref 15–37)
AST SERPL-CCNC: 4 U/L (ref 15–37)
AST SERPL-CCNC: 7 U/L (ref 15–37)
AST SERPL-CCNC: 7 U/L (ref 15–37)
AST SERPL-CCNC: 8 U/L (ref 15–37)
ATRIAL RATE: 71 BPM
BACTERIA SPEC CULT: ABNORMAL
BACTERIA URNS QL MICRO: ABNORMAL /HPF
BARBITURATES UR QL SCN: NEGATIVE
BASOPHILS # BLD: 0 K/UL (ref 0–0.06)
BASOPHILS # BLD: 0.1 K/UL (ref 0–0.06)
BASOPHILS NFR BLD: 0 % (ref 0–2)
BASOPHILS NFR BLD: 1 % (ref 0–2)
BENZODIAZ UR QL: NEGATIVE
BILIRUB DIRECT SERPL-MCNC: 0.1 MG/DL (ref 0–0.2)
BILIRUB DIRECT SERPL-MCNC: 0.1 MG/DL (ref 0–0.2)
BILIRUB DIRECT SERPL-MCNC: <0.1 MG/DL (ref 0–0.2)
BILIRUB SERPL-MCNC: 0.3 MG/DL (ref 0.2–1)
BILIRUB SERPL-MCNC: 0.3 MG/DL (ref 0.2–1)
BILIRUB SERPL-MCNC: 0.4 MG/DL (ref 0.2–1)
BILIRUB SERPL-MCNC: 0.5 MG/DL (ref 0.2–1)
BILIRUB UR QL: NEGATIVE
BLOOD GROUP ANTIBODIES SERPL: NORMAL
BNP SERPL-MCNC: 37 PG/ML (ref 0–900)
BUN SERPL-MCNC: 12 MG/DL (ref 7–18)
BUN SERPL-MCNC: 14 MG/DL (ref 7–18)
BUN SERPL-MCNC: 18 MG/DL (ref 7–18)
BUN SERPL-MCNC: 19 MG/DL (ref 7–18)
BUN SERPL-MCNC: 19 MG/DL (ref 7–18)
BUN SERPL-MCNC: 23 MG/DL (ref 7–18)
BUN/CREAT SERPL: 18 (ref 12–20)
BUN/CREAT SERPL: 20 (ref 12–20)
BUN/CREAT SERPL: 26 (ref 12–20)
BUN/CREAT SERPL: 27 (ref 12–20)
BUN/CREAT SERPL: 28 (ref 12–20)
BUN/CREAT SERPL: 30 (ref 12–20)
CA-I SERPL-SCNC: 1.08 MMOL/L (ref 1.12–1.32)
CA-I SERPL-SCNC: 1.09 MMOL/L (ref 1.12–1.32)
CA-I SERPL-SCNC: 1.15 MMOL/L (ref 1.12–1.32)
CA-I SERPL-SCNC: 1.16 MMOL/L (ref 1.12–1.32)
CA-I SERPL-SCNC: 1.17 MMOL/L (ref 1.12–1.32)
CA-I SERPL-SCNC: 1.18 MMOL/L (ref 1.12–1.32)
CALCIUM SERPL-MCNC: 8.6 MG/DL (ref 8.5–10.1)
CALCIUM SERPL-MCNC: 9 MG/DL (ref 8.5–10.1)
CALCIUM SERPL-MCNC: 9.2 MG/DL (ref 8.5–10.1)
CALCULATED P AXIS, ECG09: 33 DEGREES
CALCULATED R AXIS, ECG10: -22 DEGREES
CALCULATED T AXIS, ECG11: 7 DEGREES
CANNABINOIDS UR QL SCN: NEGATIVE
CHLORIDE SERPL-SCNC: 104 MMOL/L (ref 100–108)
CHLORIDE SERPL-SCNC: 105 MMOL/L (ref 100–108)
CHLORIDE SERPL-SCNC: 106 MMOL/L (ref 100–108)
CHLORIDE SERPL-SCNC: 107 MMOL/L (ref 100–108)
CHLORIDE SERPL-SCNC: 108 MMOL/L (ref 100–108)
CHLORIDE SERPL-SCNC: 110 MMOL/L (ref 100–108)
CHOLEST SERPL-MCNC: 145 MG/DL
CK MB CFR SERPL CALC: 2.3 % (ref 0–4)
CK MB CFR SERPL CALC: NORMAL % (ref 0–4)
CK MB SERPL-MCNC: 1.2 NG/ML (ref 5–25)
CK MB SERPL-MCNC: <1 NG/ML (ref 5–25)
CK SERPL-CCNC: 40 U/L (ref 26–192)
CK SERPL-CCNC: 53 U/L (ref 26–192)
CO2 SERPL-SCNC: 23 MMOL/L (ref 21–32)
CO2 SERPL-SCNC: 25 MMOL/L (ref 21–32)
CO2 SERPL-SCNC: 26 MMOL/L (ref 21–32)
CO2 SERPL-SCNC: 27 MMOL/L (ref 21–32)
CO2 SERPL-SCNC: 27 MMOL/L (ref 21–32)
CO2 SERPL-SCNC: 28 MMOL/L (ref 21–32)
COCAINE UR QL SCN: NEGATIVE
COLOR UR: YELLOW
CORTIS SERPL-MCNC: 9.6 UG/DL (ref 3.09–22.4)
CREAT SERPL-MCNC: 0.64 MG/DL (ref 0.6–1.3)
CREAT SERPL-MCNC: 0.65 MG/DL (ref 0.6–1.3)
CREAT SERPL-MCNC: 0.67 MG/DL (ref 0.6–1.3)
CREAT SERPL-MCNC: 0.69 MG/DL (ref 0.6–1.3)
CREAT SERPL-MCNC: 0.7 MG/DL (ref 0.6–1.3)
CREAT SERPL-MCNC: 0.89 MG/DL (ref 0.6–1.3)
CRP SERPL HS-MCNC: 4.34 MG/L (ref 0–3)
DIAGNOSIS, 93000: NORMAL
DIFFERENTIAL METHOD BLD: ABNORMAL
DIFFERENTIAL METHOD BLD: NORMAL
EOSINOPHIL # BLD: 0 K/UL (ref 0–0.4)
EOSINOPHIL # BLD: 0.1 K/UL (ref 0–0.4)
EOSINOPHIL # BLD: 0.1 K/UL (ref 0–0.4)
EOSINOPHIL # BLD: 0.2 K/UL (ref 0–0.4)
EOSINOPHIL NFR BLD: 0 % (ref 0–5)
EOSINOPHIL NFR BLD: 1 % (ref 0–5)
EOSINOPHIL NFR BLD: 1 % (ref 0–5)
EOSINOPHIL NFR BLD: 2 % (ref 0–5)
EPITH CASTS URNS QL MICRO: ABNORMAL /LPF (ref 0–5)
ERYTHROCYTE [DISTWIDTH] IN BLOOD BY AUTOMATED COUNT: 12.9 % (ref 11.6–14.5)
ERYTHROCYTE [DISTWIDTH] IN BLOOD BY AUTOMATED COUNT: 12.9 % (ref 11.6–14.5)
ERYTHROCYTE [DISTWIDTH] IN BLOOD BY AUTOMATED COUNT: 13.2 % (ref 11.6–14.5)
ERYTHROCYTE [DISTWIDTH] IN BLOOD BY AUTOMATED COUNT: 13.4 % (ref 11.6–14.5)
ERYTHROCYTE [DISTWIDTH] IN BLOOD BY AUTOMATED COUNT: 13.5 % (ref 11.6–14.5)
ERYTHROCYTE [DISTWIDTH] IN BLOOD BY AUTOMATED COUNT: 13.5 % (ref 11.6–14.5)
ERYTHROCYTE [SEDIMENTATION RATE] IN BLOOD: 24 MM/HR (ref 0–30)
EST. AVERAGE GLUCOSE BLD GHB EST-MCNC: 117 MG/DL
GLOBULIN SER CALC-MCNC: 3.4 G/DL (ref 2–4)
GLOBULIN SER CALC-MCNC: 3.4 G/DL (ref 2–4)
GLOBULIN SER CALC-MCNC: 3.5 G/DL (ref 2–4)
GLOBULIN SER CALC-MCNC: 3.5 G/DL (ref 2–4)
GLOBULIN SER CALC-MCNC: 3.6 G/DL (ref 2–4)
GLOBULIN SER CALC-MCNC: 3.7 G/DL (ref 2–4)
GLOBULIN SER CALC-MCNC: 4.2 G/DL (ref 2–4)
GLUCOSE BLD STRIP.AUTO-MCNC: 113 MG/DL (ref 70–110)
GLUCOSE BLD STRIP.AUTO-MCNC: 122 MG/DL (ref 70–110)
GLUCOSE BLD STRIP.AUTO-MCNC: 130 MG/DL (ref 70–110)
GLUCOSE BLD STRIP.AUTO-MCNC: 132 MG/DL (ref 70–110)
GLUCOSE BLD STRIP.AUTO-MCNC: 132 MG/DL (ref 70–110)
GLUCOSE BLD STRIP.AUTO-MCNC: 135 MG/DL (ref 70–110)
GLUCOSE BLD STRIP.AUTO-MCNC: 141 MG/DL (ref 70–110)
GLUCOSE BLD STRIP.AUTO-MCNC: 144 MG/DL (ref 70–110)
GLUCOSE BLD STRIP.AUTO-MCNC: 144 MG/DL (ref 70–110)
GLUCOSE BLD STRIP.AUTO-MCNC: 145 MG/DL (ref 70–110)
GLUCOSE BLD STRIP.AUTO-MCNC: 146 MG/DL (ref 70–110)
GLUCOSE BLD STRIP.AUTO-MCNC: 150 MG/DL (ref 70–110)
GLUCOSE BLD STRIP.AUTO-MCNC: 154 MG/DL (ref 70–110)
GLUCOSE BLD STRIP.AUTO-MCNC: 156 MG/DL (ref 70–110)
GLUCOSE BLD STRIP.AUTO-MCNC: 158 MG/DL (ref 70–110)
GLUCOSE BLD STRIP.AUTO-MCNC: 162 MG/DL (ref 70–110)
GLUCOSE BLD STRIP.AUTO-MCNC: 164 MG/DL (ref 70–110)
GLUCOSE BLD STRIP.AUTO-MCNC: 168 MG/DL (ref 70–110)
GLUCOSE BLD STRIP.AUTO-MCNC: 173 MG/DL (ref 70–110)
GLUCOSE BLD STRIP.AUTO-MCNC: 173 MG/DL (ref 70–110)
GLUCOSE BLD STRIP.AUTO-MCNC: 185 MG/DL (ref 70–110)
GLUCOSE BLD STRIP.AUTO-MCNC: 188 MG/DL (ref 70–110)
GLUCOSE BLD STRIP.AUTO-MCNC: 189 MG/DL (ref 70–110)
GLUCOSE BLD STRIP.AUTO-MCNC: 191 MG/DL (ref 70–110)
GLUCOSE SERPL-MCNC: 117 MG/DL (ref 74–99)
GLUCOSE SERPL-MCNC: 129 MG/DL (ref 74–99)
GLUCOSE SERPL-MCNC: 138 MG/DL (ref 74–99)
GLUCOSE SERPL-MCNC: 148 MG/DL (ref 74–99)
GLUCOSE SERPL-MCNC: 158 MG/DL (ref 74–99)
GLUCOSE SERPL-MCNC: 99 MG/DL (ref 74–99)
GLUCOSE UR STRIP.AUTO-MCNC: >1000 MG/DL
HBA1C MFR BLD HPLC: 6.1 %
HBA1C MFR BLD: 5.7 % (ref 4.2–5.6)
HCT VFR BLD AUTO: 38.6 % (ref 35–45)
HCT VFR BLD AUTO: 39.8 % (ref 35–45)
HCT VFR BLD AUTO: 40.2 % (ref 35–45)
HCT VFR BLD AUTO: 40.3 % (ref 35–45)
HCT VFR BLD AUTO: 42.3 % (ref 35–45)
HCT VFR BLD AUTO: 44.3 % (ref 35–45)
HDLC SERPL-MCNC: 46 MG/DL (ref 40–60)
HDLC SERPL: 3.2 {RATIO} (ref 0–5)
HDSCOM,HDSCOM: NORMAL
HGB BLD-MCNC: 12.9 G/DL (ref 12–16)
HGB BLD-MCNC: 12.9 G/DL (ref 12–16)
HGB BLD-MCNC: 13.1 G/DL (ref 12–16)
HGB BLD-MCNC: 13.1 G/DL (ref 12–16)
HGB BLD-MCNC: 13.8 G/DL (ref 12–16)
HGB BLD-MCNC: 14.5 G/DL (ref 12–16)
HGB UR QL STRIP: ABNORMAL
INR PPP: 1 (ref 0.8–1.2)
INR PPP: 1.1 (ref 0.8–1.2)
KETONES UR QL STRIP.AUTO: 40 MG/DL
LDLC SERPL CALC-MCNC: 84.6 MG/DL (ref 0–100)
LEUKOCYTE ESTERASE UR QL STRIP.AUTO: NEGATIVE
LIPASE SERPL-CCNC: 72 U/L (ref 73–393)
LIPASE SERPL-CCNC: 77 U/L (ref 73–393)
LIPASE SERPL-CCNC: 86 U/L (ref 73–393)
LIPID PROFILE,FLP: NORMAL
LYMPHOCYTES # BLD: 1.2 K/UL (ref 0.9–3.6)
LYMPHOCYTES # BLD: 1.3 K/UL (ref 0.9–3.6)
LYMPHOCYTES # BLD: 2 K/UL (ref 0.9–3.6)
LYMPHOCYTES # BLD: 2.2 K/UL (ref 0.9–3.6)
LYMPHOCYTES NFR BLD: 14 % (ref 21–52)
LYMPHOCYTES NFR BLD: 16 % (ref 21–52)
LYMPHOCYTES NFR BLD: 25 % (ref 21–52)
LYMPHOCYTES NFR BLD: 29 % (ref 21–52)
MAGNESIUM SERPL-MCNC: 1.9 MG/DL (ref 1.6–2.6)
MAGNESIUM SERPL-MCNC: 1.9 MG/DL (ref 1.6–2.6)
MAGNESIUM SERPL-MCNC: 2 MG/DL (ref 1.6–2.6)
MAGNESIUM SERPL-MCNC: 2.1 MG/DL (ref 1.6–2.6)
MAGNESIUM SERPL-MCNC: 2.1 MG/DL (ref 1.6–2.6)
MAGNESIUM SERPL-MCNC: 2.2 MG/DL (ref 1.6–2.6)
MCH RBC QN AUTO: 28.8 PG (ref 24–34)
MCH RBC QN AUTO: 29 PG (ref 24–34)
MCH RBC QN AUTO: 29 PG (ref 24–34)
MCH RBC QN AUTO: 29.1 PG (ref 24–34)
MCH RBC QN AUTO: 29.2 PG (ref 24–34)
MCH RBC QN AUTO: 29.6 PG (ref 24–34)
MCHC RBC AUTO-ENTMCNC: 32 G/DL (ref 31–37)
MCHC RBC AUTO-ENTMCNC: 32.6 G/DL (ref 31–37)
MCHC RBC AUTO-ENTMCNC: 32.6 G/DL (ref 31–37)
MCHC RBC AUTO-ENTMCNC: 32.7 G/DL (ref 31–37)
MCHC RBC AUTO-ENTMCNC: 32.9 G/DL (ref 31–37)
MCHC RBC AUTO-ENTMCNC: 33.4 G/DL (ref 31–37)
MCV RBC AUTO: 86.9 FL (ref 74–97)
MCV RBC AUTO: 88.2 FL (ref 74–97)
MCV RBC AUTO: 88.9 FL (ref 74–97)
MCV RBC AUTO: 89.3 FL (ref 74–97)
MCV RBC AUTO: 90 FL (ref 74–97)
MCV RBC AUTO: 91 FL (ref 74–97)
METHADONE UR QL: NEGATIVE
MONOCYTES # BLD: 0.5 K/UL (ref 0.05–1.2)
MONOCYTES # BLD: 0.5 K/UL (ref 0.05–1.2)
MONOCYTES # BLD: 0.6 K/UL (ref 0.05–1.2)
MONOCYTES # BLD: 0.6 K/UL (ref 0.05–1.2)
MONOCYTES NFR BLD: 6 % (ref 3–10)
MONOCYTES NFR BLD: 6 % (ref 3–10)
MONOCYTES NFR BLD: 7 % (ref 3–10)
MONOCYTES NFR BLD: 8 % (ref 3–10)
NEUTS SEG # BLD: 4.6 K/UL (ref 1.8–8)
NEUTS SEG # BLD: 5.4 K/UL (ref 1.8–8)
NEUTS SEG # BLD: 6.1 K/UL (ref 1.8–8)
NEUTS SEG # BLD: 7.2 K/UL (ref 1.8–8)
NEUTS SEG NFR BLD: 60 % (ref 40–73)
NEUTS SEG NFR BLD: 67 % (ref 40–73)
NEUTS SEG NFR BLD: 77 % (ref 40–73)
NEUTS SEG NFR BLD: 80 % (ref 40–73)
NITRITE UR QL STRIP.AUTO: NEGATIVE
OPIATES UR QL: NEGATIVE
P-R INTERVAL, ECG05: 138 MS
PCP UR QL: NEGATIVE
PH UR STRIP: 5 [PH] (ref 5–8)
PHOSPHATE SERPL-MCNC: 2.9 MG/DL (ref 2.5–4.9)
PHOSPHATE SERPL-MCNC: 3 MG/DL (ref 2.5–4.9)
PHOSPHATE SERPL-MCNC: 3 MG/DL (ref 2.5–4.9)
PHOSPHATE SERPL-MCNC: 3.1 MG/DL (ref 2.5–4.9)
PHOSPHATE SERPL-MCNC: 3.4 MG/DL (ref 2.5–4.9)
PLATELET # BLD AUTO: 137 K/UL (ref 135–420)
PLATELET # BLD AUTO: 188 K/UL (ref 135–420)
PLATELET # BLD AUTO: 191 K/UL (ref 135–420)
PLATELET # BLD AUTO: 192 K/UL (ref 135–420)
PLATELET # BLD AUTO: 199 K/UL (ref 135–420)
PLATELET # BLD AUTO: 199 K/UL (ref 135–420)
PMV BLD AUTO: 10 FL (ref 9.2–11.8)
PMV BLD AUTO: 10.5 FL (ref 9.2–11.8)
PMV BLD AUTO: 10.5 FL (ref 9.2–11.8)
PMV BLD AUTO: 10.6 FL (ref 9.2–11.8)
PMV BLD AUTO: 10.7 FL (ref 9.2–11.8)
PMV BLD AUTO: 10.8 FL (ref 9.2–11.8)
POTASSIUM SERPL-SCNC: 3.7 MMOL/L (ref 3.5–5.5)
POTASSIUM SERPL-SCNC: 3.8 MMOL/L (ref 3.5–5.5)
POTASSIUM SERPL-SCNC: 4 MMOL/L (ref 3.5–5.5)
POTASSIUM SERPL-SCNC: 4.2 MMOL/L (ref 3.5–5.5)
POTASSIUM SERPL-SCNC: 4.4 MMOL/L (ref 3.5–5.5)
POTASSIUM SERPL-SCNC: 4.6 MMOL/L (ref 3.5–5.5)
POTASSIUM SERPL-SCNC: 4.6 MMOL/L (ref 3.5–5.5)
PROT SERPL-MCNC: 6.7 G/DL (ref 6.4–8.2)
PROT SERPL-MCNC: 6.7 G/DL (ref 6.4–8.2)
PROT SERPL-MCNC: 6.9 G/DL (ref 6.4–8.2)
PROT SERPL-MCNC: 7.1 G/DL (ref 6.4–8.2)
PROT SERPL-MCNC: 8.1 G/DL (ref 6.4–8.2)
PROT UR STRIP-MCNC: NEGATIVE MG/DL
PROTHROMBIN TIME: 12.5 SEC (ref 11.5–15.2)
PROTHROMBIN TIME: 12.7 SEC (ref 11.5–15.2)
PROTHROMBIN TIME: 12.9 SEC (ref 11.5–15.2)
PROTHROMBIN TIME: 13.1 SEC (ref 11.5–15.2)
PROTHROMBIN TIME: 13.3 SEC (ref 11.5–15.2)
PROTHROMBIN TIME: 13.4 SEC (ref 11.5–15.2)
PROTHROMBIN TIME: 13.5 SEC (ref 11.5–15.2)
PROTHROMBIN TIME: 13.9 SEC (ref 11.5–15.2)
Q-T INTERVAL, ECG07: 422 MS
QRS DURATION, ECG06: 86 MS
QTC CALCULATION (BEZET), ECG08: 458 MS
QUICKVUE INFLUENZA TEST: NEGATIVE
RBC # BLD AUTO: 4.42 M/UL (ref 4.2–5.3)
RBC # BLD AUTO: 4.44 M/UL (ref 4.2–5.3)
RBC # BLD AUTO: 4.48 M/UL (ref 4.2–5.3)
RBC # BLD AUTO: 4.51 M/UL (ref 4.2–5.3)
RBC # BLD AUTO: 4.76 M/UL (ref 4.2–5.3)
RBC # BLD AUTO: 4.96 M/UL (ref 4.2–5.3)
RBC #/AREA URNS HPF: ABNORMAL /HPF (ref 0–5)
S PYO AG THROAT QL: NEGATIVE
SERVICE CMNT-IMP: ABNORMAL
SODIUM SERPL-SCNC: 138 MMOL/L (ref 136–145)
SODIUM SERPL-SCNC: 140 MMOL/L (ref 136–145)
SODIUM SERPL-SCNC: 141 MMOL/L (ref 136–145)
SODIUM SERPL-SCNC: 141 MMOL/L (ref 136–145)
SODIUM SERPL-SCNC: 142 MMOL/L (ref 136–145)
SODIUM SERPL-SCNC: 142 MMOL/L (ref 136–145)
SP GR UR REFRACTOMETRY: 1.04 (ref 1–1.03)
SPECIMEN EXP DATE BLD: NORMAL
TRIGL SERPL-MCNC: 72 MG/DL (ref ?–150)
TROPONIN I SERPL-MCNC: <0.02 NG/ML (ref 0–0.04)
TROPONIN I SERPL-MCNC: <0.02 NG/ML (ref 0–0.04)
UROBILINOGEN UR QL STRIP.AUTO: 0.2 EU/DL (ref 0.2–1)
VALID INTERNAL CONTROL?: YES
VALID INTERNAL CONTROL?: YES
VENTRICULAR RATE, ECG03: 71 BPM
VLDLC SERPL CALC-MCNC: 14.4 MG/DL
WBC # BLD AUTO: 10.8 K/UL (ref 4.6–13.2)
WBC # BLD AUTO: 7.6 K/UL (ref 4.6–13.2)
WBC # BLD AUTO: 8 K/UL (ref 4.6–13.2)
WBC # BLD AUTO: 8.1 K/UL (ref 4.6–13.2)
WBC # BLD AUTO: 9 K/UL (ref 4.6–13.2)
WBC # BLD AUTO: 9.8 K/UL (ref 4.6–13.2)
WBC URNS QL MICRO: ABNORMAL /HPF (ref 0–4)

## 2018-01-01 PROCEDURE — 83735 ASSAY OF MAGNESIUM: CPT | Performed by: HOSPITALIST

## 2018-01-01 PROCEDURE — 95816 EEG AWAKE AND DROWSY: CPT | Performed by: PSYCHIATRY & NEUROLOGY

## 2018-01-01 PROCEDURE — 3331090001 HH PPS REVENUE CREDIT

## 2018-01-01 PROCEDURE — 74011000250 HC RX REV CODE- 250: Performed by: HOSPITALIST

## 2018-01-01 PROCEDURE — 85610 PROTHROMBIN TIME: CPT | Performed by: HOSPITALIST

## 2018-01-01 PROCEDURE — 3331090002 HH PPS REVENUE DEBIT

## 2018-01-01 PROCEDURE — 96365 THER/PROPH/DIAG IV INF INIT: CPT

## 2018-01-01 PROCEDURE — 80076 HEPATIC FUNCTION PANEL: CPT | Performed by: HOSPITALIST

## 2018-01-01 PROCEDURE — 84132 ASSAY OF SERUM POTASSIUM: CPT | Performed by: HOSPITALIST

## 2018-01-01 PROCEDURE — 82962 GLUCOSE BLOOD TEST: CPT

## 2018-01-01 PROCEDURE — G0152 HHCP-SERV OF OT,EA 15 MIN: HCPCS

## 2018-01-01 PROCEDURE — 74011636637 HC RX REV CODE- 636/637: Performed by: HOSPITALIST

## 2018-01-01 PROCEDURE — 80307 DRUG TEST PRSMV CHEM ANLYZR: CPT | Performed by: HOSPITALIST

## 2018-01-01 PROCEDURE — 65610000009 HC RM ICU NEURO

## 2018-01-01 PROCEDURE — 85610 PROTHROMBIN TIME: CPT | Performed by: EMERGENCY MEDICINE

## 2018-01-01 PROCEDURE — 400013 HH SOC

## 2018-01-01 PROCEDURE — 74011000258 HC RX REV CODE- 258: Performed by: HOSPITALIST

## 2018-01-01 PROCEDURE — 36415 COLL VENOUS BLD VENIPUNCTURE: CPT | Performed by: HOSPITALIST

## 2018-01-01 PROCEDURE — G0153 HHCP-SVS OF S/L PATH,EA 15MN: HCPCS

## 2018-01-01 PROCEDURE — 86141 C-REACTIVE PROTEIN HS: CPT | Performed by: HOSPITALIST

## 2018-01-01 PROCEDURE — 77030011256 HC DRSG MEPILEX <16IN NO BORD MOLN -A

## 2018-01-01 PROCEDURE — 74011250636 HC RX REV CODE- 250/636: Performed by: INTERNAL MEDICINE

## 2018-01-01 PROCEDURE — 77030020245 HC SOL INJ 5% D/0.9%NACL

## 2018-01-01 PROCEDURE — 74011250637 HC RX REV CODE- 250/637: Performed by: HOSPITALIST

## 2018-01-01 PROCEDURE — G0151 HHCP-SERV OF PT,EA 15 MIN: HCPCS

## 2018-01-01 PROCEDURE — 80048 BASIC METABOLIC PNL TOTAL CA: CPT | Performed by: HOSPITALIST

## 2018-01-01 PROCEDURE — 97166 OT EVAL MOD COMPLEX 45 MIN: CPT

## 2018-01-01 PROCEDURE — 70450 CT HEAD/BRAIN W/O DYE: CPT

## 2018-01-01 PROCEDURE — 85025 COMPLETE CBC W/AUTO DIFF WBC: CPT | Performed by: HOSPITALIST

## 2018-01-01 PROCEDURE — 84100 ASSAY OF PHOSPHORUS: CPT | Performed by: HOSPITALIST

## 2018-01-01 PROCEDURE — 97168 OT RE-EVAL EST PLAN CARE: CPT

## 2018-01-01 PROCEDURE — 85730 THROMBOPLASTIN TIME PARTIAL: CPT | Performed by: HOSPITALIST

## 2018-01-01 PROCEDURE — G0155 HHCP-SVS OF CSW,EA 15 MIN: HCPCS

## 2018-01-01 PROCEDURE — 97530 THERAPEUTIC ACTIVITIES: CPT

## 2018-01-01 PROCEDURE — 96361 HYDRATE IV INFUSION ADD-ON: CPT

## 2018-01-01 PROCEDURE — 82330 ASSAY OF CALCIUM: CPT | Performed by: HOSPITALIST

## 2018-01-01 PROCEDURE — 74011250636 HC RX REV CODE- 250/636: Performed by: HOSPITALIST

## 2018-01-01 PROCEDURE — 86900 BLOOD TYPING SEROLOGIC ABO: CPT | Performed by: HOSPITALIST

## 2018-01-01 PROCEDURE — 74011250636 HC RX REV CODE- 250/636: Performed by: EMERGENCY MEDICINE

## 2018-01-01 PROCEDURE — 80061 LIPID PANEL: CPT | Performed by: HOSPITALIST

## 2018-01-01 PROCEDURE — 65660000001 HC RM ICU INTERMED STEPDOWN

## 2018-01-01 PROCEDURE — 82533 TOTAL CORTISOL: CPT | Performed by: HOSPITALIST

## 2018-01-01 PROCEDURE — 71045 X-RAY EXAM CHEST 1 VIEW: CPT

## 2018-01-01 PROCEDURE — 83690 ASSAY OF LIPASE: CPT | Performed by: HOSPITALIST

## 2018-01-01 PROCEDURE — 77030032490 HC SLV COMPR SCD KNE COVD -B

## 2018-01-01 PROCEDURE — 93306 TTE W/DOPPLER COMPLETE: CPT

## 2018-01-01 PROCEDURE — 82140 ASSAY OF AMMONIA: CPT | Performed by: HOSPITALIST

## 2018-01-01 PROCEDURE — 3331090003 HH PPS REVENUE ADJ

## 2018-01-01 PROCEDURE — 97164 PT RE-EVAL EST PLAN CARE: CPT

## 2018-01-01 PROCEDURE — 80053 COMPREHEN METABOLIC PANEL: CPT | Performed by: EMERGENCY MEDICINE

## 2018-01-01 PROCEDURE — 85652 RBC SED RATE AUTOMATED: CPT | Performed by: HOSPITALIST

## 2018-01-01 PROCEDURE — 97535 SELF CARE MNGMENT TRAINING: CPT

## 2018-01-01 PROCEDURE — A9577 INJ MULTIHANCE: HCPCS | Performed by: EMERGENCY MEDICINE

## 2018-01-01 PROCEDURE — 87086 URINE CULTURE/COLONY COUNT: CPT | Performed by: EMERGENCY MEDICINE

## 2018-01-01 PROCEDURE — 97110 THERAPEUTIC EXERCISES: CPT

## 2018-01-01 PROCEDURE — 85730 THROMBOPLASTIN TIME PARTIAL: CPT | Performed by: EMERGENCY MEDICINE

## 2018-01-01 PROCEDURE — 80053 COMPREHEN METABOLIC PANEL: CPT | Performed by: HOSPITALIST

## 2018-01-01 PROCEDURE — 65270000029 HC RM PRIVATE

## 2018-01-01 PROCEDURE — 99285 EMERGENCY DEPT VISIT HI MDM: CPT

## 2018-01-01 PROCEDURE — 83880 ASSAY OF NATRIURETIC PEPTIDE: CPT | Performed by: EMERGENCY MEDICINE

## 2018-01-01 PROCEDURE — 70553 MRI BRAIN STEM W/O & W/DYE: CPT

## 2018-01-01 PROCEDURE — 97162 PT EVAL MOD COMPLEX 30 MIN: CPT

## 2018-01-01 PROCEDURE — 85027 COMPLETE CBC AUTOMATED: CPT | Performed by: EMERGENCY MEDICINE

## 2018-01-01 PROCEDURE — 76450000000

## 2018-01-01 PROCEDURE — 74011000258 HC RX REV CODE- 258: Performed by: EMERGENCY MEDICINE

## 2018-01-01 PROCEDURE — C9113 INJ PANTOPRAZOLE SODIUM, VIA: HCPCS | Performed by: HOSPITALIST

## 2018-01-01 PROCEDURE — 83036 HEMOGLOBIN GLYCOSYLATED A1C: CPT | Performed by: HOSPITALIST

## 2018-01-01 PROCEDURE — 82550 ASSAY OF CK (CPK): CPT | Performed by: EMERGENCY MEDICINE

## 2018-01-01 PROCEDURE — 74011250637 HC RX REV CODE- 250/637: Performed by: EMERGENCY MEDICINE

## 2018-01-01 PROCEDURE — 92522 EVALUATE SPEECH PRODUCTION: CPT

## 2018-01-01 PROCEDURE — G0157 HHC PT ASSISTANT EA 15: HCPCS

## 2018-01-01 PROCEDURE — 92526 ORAL FUNCTION THERAPY: CPT

## 2018-01-01 PROCEDURE — 93005 ELECTROCARDIOGRAM TRACING: CPT

## 2018-01-01 PROCEDURE — 81001 URINALYSIS AUTO W/SCOPE: CPT | Performed by: EMERGENCY MEDICINE

## 2018-01-01 PROCEDURE — 86850 RBC ANTIBODY SCREEN: CPT | Performed by: HOSPITALIST

## 2018-01-01 PROCEDURE — 92610 EVALUATE SWALLOWING FUNCTION: CPT

## 2018-01-01 RX ORDER — CITALOPRAM 20 MG/1
20 TABLET, FILM COATED ORAL DAILY
Status: DISCONTINUED | OUTPATIENT
Start: 2018-01-01 | End: 2018-01-01 | Stop reason: HOSPADM

## 2018-01-01 RX ORDER — ASPIRIN 300 MG/1
300 SUPPOSITORY RECTAL
Status: COMPLETED | OUTPATIENT
Start: 2018-01-01 | End: 2018-01-01

## 2018-01-01 RX ORDER — INSULIN LISPRO 100 [IU]/ML
INJECTION, SOLUTION INTRAVENOUS; SUBCUTANEOUS
Status: DISCONTINUED | OUTPATIENT
Start: 2018-01-01 | End: 2018-01-01 | Stop reason: HOSPADM

## 2018-01-01 RX ORDER — DEXTROSE, SODIUM CHLORIDE, AND POTASSIUM CHLORIDE 5; .9; .15 G/100ML; G/100ML; G/100ML
67 INJECTION INTRAVENOUS CONTINUOUS
Status: DISCONTINUED | OUTPATIENT
Start: 2018-01-01 | End: 2018-01-01

## 2018-01-01 RX ORDER — ACETAMINOPHEN 325 MG/1
650 TABLET ORAL
Status: DISCONTINUED | OUTPATIENT
Start: 2018-01-01 | End: 2018-01-01 | Stop reason: HOSPADM

## 2018-01-01 RX ORDER — FOLIC ACID 1 MG/1
1 TABLET ORAL DAILY
Qty: 30 TAB | Refills: 0 | Status: SHIPPED
Start: 2018-01-01

## 2018-01-01 RX ORDER — ASPIRIN 325 MG/1
100 TABLET, FILM COATED ORAL DAILY
Status: DISCONTINUED | OUTPATIENT
Start: 2018-01-01 | End: 2018-01-01 | Stop reason: HOSPADM

## 2018-01-01 RX ORDER — DEXTROSE MONOHYDRATE AND SODIUM CHLORIDE 5; .9 G/100ML; G/100ML
67 INJECTION, SOLUTION INTRAVENOUS CONTINUOUS
Status: DISCONTINUED | OUTPATIENT
Start: 2018-01-01 | End: 2018-01-01

## 2018-01-01 RX ORDER — DEXAMETHASONE 4 MG/1
2 TABLET ORAL EVERY 12 HOURS
Status: DISCONTINUED | OUTPATIENT
Start: 2018-01-01 | End: 2018-01-01 | Stop reason: HOSPADM

## 2018-01-01 RX ORDER — DEXTROSE 50 % IN WATER (D50W) INTRAVENOUS SYRINGE
25-50 AS NEEDED
Status: DISCONTINUED | OUTPATIENT
Start: 2018-01-01 | End: 2018-01-01 | Stop reason: HOSPADM

## 2018-01-01 RX ORDER — LABETALOL HCL 20 MG/4 ML
5 SYRINGE (ML) INTRAVENOUS
Status: DISCONTINUED | OUTPATIENT
Start: 2018-01-01 | End: 2018-01-01 | Stop reason: HOSPADM

## 2018-01-01 RX ORDER — DONEPEZIL HYDROCHLORIDE 5 MG/1
10 TABLET, FILM COATED ORAL
Status: DISCONTINUED | OUTPATIENT
Start: 2018-01-01 | End: 2018-01-01 | Stop reason: HOSPADM

## 2018-01-01 RX ORDER — DEXAMETHASONE 0.5 MG/1
1 TABLET ORAL EVERY 12 HOURS
Status: DISCONTINUED | OUTPATIENT
Start: 2018-01-01 | End: 2018-01-01 | Stop reason: CLARIF

## 2018-01-01 RX ORDER — FOLIC ACID 1 MG/1
1 TABLET ORAL DAILY
Status: DISCONTINUED | OUTPATIENT
Start: 2018-01-01 | End: 2018-01-01 | Stop reason: HOSPADM

## 2018-01-01 RX ORDER — ASPIRIN 325 MG/1
100 TABLET, FILM COATED ORAL DAILY
Qty: 30 TAB | Refills: 0 | Status: SHIPPED
Start: 2018-01-01

## 2018-01-01 RX ORDER — DEXTROSE MONOHYDRATE AND SODIUM CHLORIDE 5; .9 G/100ML; G/100ML
0.75 INJECTION, SOLUTION INTRAVENOUS CONTINUOUS
Status: DISCONTINUED | OUTPATIENT
Start: 2018-01-01 | End: 2018-01-01

## 2018-01-01 RX ORDER — DEXTROSE, SODIUM CHLORIDE, AND POTASSIUM CHLORIDE 5; .9; .15 G/100ML; G/100ML; G/100ML
0.75 INJECTION INTRAVENOUS CONTINUOUS
Status: DISCONTINUED | OUTPATIENT
Start: 2018-01-01 | End: 2018-01-01

## 2018-01-01 RX ORDER — POTASSIUM CHLORIDE 750 MG/1
10 TABLET, FILM COATED, EXTENDED RELEASE ORAL ONCE
Status: COMPLETED | OUTPATIENT
Start: 2018-01-01 | End: 2018-01-01

## 2018-01-01 RX ORDER — DEXAMETHASONE 4 MG/1
2 TABLET ORAL 2 TIMES DAILY WITH MEALS
Qty: 60 TAB | Refills: 0 | Status: SHIPPED
Start: 2018-01-01

## 2018-01-01 RX ORDER — AMLODIPINE BESYLATE 10 MG/1
10 TABLET ORAL DAILY
Qty: 30 TAB | Refills: 0 | Status: SHIPPED
Start: 2018-01-01

## 2018-01-01 RX ORDER — PANTOPRAZOLE SODIUM 40 MG/1
40 TABLET, DELAYED RELEASE ORAL EVERY 12 HOURS
Status: DISCONTINUED | OUTPATIENT
Start: 2018-01-01 | End: 2018-01-01 | Stop reason: HOSPADM

## 2018-01-01 RX ORDER — DEXAMETHASONE 2 MG/1
1 TABLET ORAL EVERY 12 HOURS
Status: DISCONTINUED | OUTPATIENT
Start: 2018-01-01 | End: 2018-01-01

## 2018-01-01 RX ORDER — ACETAMINOPHEN 650 MG/1
650 SUPPOSITORY RECTAL
Status: DISCONTINUED | OUTPATIENT
Start: 2018-01-01 | End: 2018-01-01 | Stop reason: HOSPADM

## 2018-01-01 RX ORDER — POLYETHYLENE GLYCOL 3350 17 G/17G
17 POWDER, FOR SOLUTION ORAL DAILY
Status: DISCONTINUED | OUTPATIENT
Start: 2018-01-01 | End: 2018-01-01 | Stop reason: HOSPADM

## 2018-01-01 RX ORDER — AMLODIPINE BESYLATE 10 MG/1
10 TABLET ORAL DAILY
Status: DISCONTINUED | OUTPATIENT
Start: 2018-01-01 | End: 2018-01-01 | Stop reason: HOSPADM

## 2018-01-01 RX ORDER — ALBUTEROL SULFATE 0.83 MG/ML
2.5 SOLUTION RESPIRATORY (INHALATION)
Status: DISCONTINUED | OUTPATIENT
Start: 2018-01-01 | End: 2018-01-01 | Stop reason: HOSPADM

## 2018-01-01 RX ORDER — ERGOCALCIFEROL 1.25 MG/1
CAPSULE ORAL
Qty: 12 CAP | Refills: 3 | Status: SHIPPED | OUTPATIENT
Start: 2018-01-01 | End: 2018-01-01

## 2018-01-01 RX ORDER — DEXTROSE MONOHYDRATE AND SODIUM CHLORIDE 5; .9 G/100ML; G/100ML
67.1 INJECTION, SOLUTION INTRAVENOUS CONTINUOUS
Status: DISCONTINUED | OUTPATIENT
Start: 2018-01-01 | End: 2018-01-01

## 2018-01-01 RX ORDER — ENALAPRIL MALEATE 10 MG/1
20 TABLET ORAL DAILY
Status: DISCONTINUED | OUTPATIENT
Start: 2018-01-01 | End: 2018-01-01 | Stop reason: HOSPADM

## 2018-01-01 RX ORDER — AMOXICILLIN 250 MG
2 CAPSULE ORAL
Status: DISCONTINUED | OUTPATIENT
Start: 2018-01-01 | End: 2018-01-01 | Stop reason: HOSPADM

## 2018-01-01 RX ORDER — ONDANSETRON 2 MG/ML
2 INJECTION INTRAMUSCULAR; INTRAVENOUS
Status: DISCONTINUED | OUTPATIENT
Start: 2018-01-01 | End: 2018-01-01 | Stop reason: HOSPADM

## 2018-01-01 RX ORDER — PANTOPRAZOLE SODIUM 40 MG/1
40 GRANULE, DELAYED RELEASE ORAL EVERY 12 HOURS
Status: DISCONTINUED | OUTPATIENT
Start: 2018-01-01 | End: 2018-01-01 | Stop reason: HOSPADM

## 2018-01-01 RX ORDER — NICARDIPINE HYDROCHLORIDE 0.1 MG/ML
0-15 INJECTION INTRAVENOUS
Status: DISCONTINUED | OUTPATIENT
Start: 2018-01-01 | End: 2018-01-01

## 2018-01-01 RX ORDER — MAGNESIUM SULFATE 100 %
16 CRYSTALS MISCELLANEOUS AS NEEDED
Status: DISCONTINUED | OUTPATIENT
Start: 2018-01-01 | End: 2018-01-01 | Stop reason: HOSPADM

## 2018-01-01 RX ORDER — DONEPEZIL HYDROCHLORIDE 10 MG/1
10 TABLET, FILM COATED ORAL
Qty: 90 TAB | Refills: 1 | Status: SHIPPED | OUTPATIENT
Start: 2018-01-01

## 2018-01-01 RX ADMIN — FOLIC ACID: 5 INJECTION, SOLUTION INTRAMUSCULAR; INTRAVENOUS; SUBCUTANEOUS at 18:36

## 2018-01-01 RX ADMIN — AMLODIPINE BESYLATE 10 MG: 10 TABLET ORAL at 09:39

## 2018-01-01 RX ADMIN — DEXAMETHASONE 2 MG: 4 TABLET ORAL at 08:59

## 2018-01-01 RX ADMIN — ENALAPRIL MALEATE 20 MG: 10 TABLET ORAL at 11:33

## 2018-01-01 RX ADMIN — PANTOPRAZOLE SODIUM 40 MG: 40 INJECTION, POWDER, FOR SOLUTION INTRAVENOUS at 15:13

## 2018-01-01 RX ADMIN — POTASSIUM CHLORIDE 10 MEQ: 750 TABLET, EXTENDED RELEASE ORAL at 09:39

## 2018-01-01 RX ADMIN — PANTOPRAZOLE SODIUM 40 MG: 40 TABLET, DELAYED RELEASE ORAL at 10:02

## 2018-01-01 RX ADMIN — DEXTROSE AND SODIUM CHLORIDE 67 ML/HR: 5; 900 INJECTION, SOLUTION INTRAVENOUS at 23:59

## 2018-01-01 RX ADMIN — DONEPEZIL HYDROCHLORIDE 10 MG: 5 TABLET, FILM COATED ORAL at 21:59

## 2018-01-01 RX ADMIN — DEXAMETHASONE 2 MG: 4 TABLET ORAL at 22:04

## 2018-01-01 RX ADMIN — INSULIN LISPRO 2 UNITS: 100 INJECTION, SOLUTION INTRAVENOUS; SUBCUTANEOUS at 18:01

## 2018-01-01 RX ADMIN — CITALOPRAM HYDROBROMIDE 20 MG: 20 TABLET, FILM COATED ORAL at 10:01

## 2018-01-01 RX ADMIN — CITALOPRAM HYDROBROMIDE 20 MG: 20 TABLET, FILM COATED ORAL at 09:39

## 2018-01-01 RX ADMIN — ONDANSETRON 2 MG: 2 INJECTION INTRAMUSCULAR; INTRAVENOUS at 17:48

## 2018-01-01 RX ADMIN — DONEPEZIL HYDROCHLORIDE 10 MG: 5 TABLET, FILM COATED ORAL at 21:37

## 2018-01-01 RX ADMIN — ENALAPRIL MALEATE 20 MG: 10 TABLET ORAL at 10:01

## 2018-01-01 RX ADMIN — Medication 100 MG: at 08:59

## 2018-01-01 RX ADMIN — INSULIN LISPRO 2 UNITS: 100 INJECTION, SOLUTION INTRAVENOUS; SUBCUTANEOUS at 17:40

## 2018-01-01 RX ADMIN — INSULIN LISPRO 2 UNITS: 100 INJECTION, SOLUTION INTRAVENOUS; SUBCUTANEOUS at 17:34

## 2018-01-01 RX ADMIN — AMLODIPINE BESYLATE 10 MG: 10 TABLET ORAL at 08:58

## 2018-01-01 RX ADMIN — DONEPEZIL HYDROCHLORIDE 10 MG: 5 TABLET, FILM COATED ORAL at 21:56

## 2018-01-01 RX ADMIN — PANTOPRAZOLE SODIUM 40 MG: 40 TABLET, DELAYED RELEASE ORAL at 10:01

## 2018-01-01 RX ADMIN — Medication 100 MG: at 17:15

## 2018-01-01 RX ADMIN — PANTOPRAZOLE SODIUM 40 MG: 40 TABLET, DELAYED RELEASE ORAL at 08:59

## 2018-01-01 RX ADMIN — PANTOPRAZOLE SODIUM 40 MG: 40 TABLET, DELAYED RELEASE ORAL at 21:56

## 2018-01-01 RX ADMIN — FOLIC ACID 1 MG: 1 TABLET ORAL at 17:15

## 2018-01-01 RX ADMIN — INSULIN LISPRO 2 UNITS: 100 INJECTION, SOLUTION INTRAVENOUS; SUBCUTANEOUS at 12:49

## 2018-01-01 RX ADMIN — INSULIN LISPRO 2 UNITS: 100 INJECTION, SOLUTION INTRAVENOUS; SUBCUTANEOUS at 16:38

## 2018-01-01 RX ADMIN — DONEPEZIL HYDROCHLORIDE 10 MG: 5 TABLET, FILM COATED ORAL at 22:05

## 2018-01-01 RX ADMIN — FOLIC ACID: 5 INJECTION, SOLUTION INTRAMUSCULAR; INTRAVENOUS; SUBCUTANEOUS at 16:52

## 2018-01-01 RX ADMIN — ONDANSETRON 2 MG: 2 INJECTION INTRAMUSCULAR; INTRAVENOUS at 07:08

## 2018-01-01 RX ADMIN — ASPIRIN 300 MG: 300 SUPPOSITORY RECTAL at 10:17

## 2018-01-01 RX ADMIN — INSULIN LISPRO 2 UNITS: 100 INJECTION, SOLUTION INTRAVENOUS; SUBCUTANEOUS at 08:58

## 2018-01-01 RX ADMIN — ENALAPRIL MALEATE 20 MG: 10 TABLET ORAL at 08:58

## 2018-01-01 RX ADMIN — CITALOPRAM HYDROBROMIDE 20 MG: 20 TABLET, FILM COATED ORAL at 08:58

## 2018-01-01 RX ADMIN — PANTOPRAZOLE SODIUM 40 MG: 40 TABLET, DELAYED RELEASE ORAL at 21:26

## 2018-01-01 RX ADMIN — FOLIC ACID: 5 INJECTION, SOLUTION INTRAMUSCULAR; INTRAVENOUS; SUBCUTANEOUS at 16:37

## 2018-01-01 RX ADMIN — DEXTROSE AND SODIUM CHLORIDE 67 ML/HR: 5; 900 INJECTION, SOLUTION INTRAVENOUS at 09:15

## 2018-01-01 RX ADMIN — GADOBENATE DIMEGLUMINE 15 ML: 529 INJECTION, SOLUTION INTRAVENOUS at 13:38

## 2018-01-01 RX ADMIN — DEXAMETHASONE 2 MG: 4 TABLET ORAL at 09:39

## 2018-01-01 RX ADMIN — POTASSIUM CHLORIDE, DEXTROSE MONOHYDRATE AND SODIUM CHLORIDE 67 ML/HR: 150; 5; 900 INJECTION, SOLUTION INTRAVENOUS at 14:39

## 2018-01-01 RX ADMIN — PANTOPRAZOLE SODIUM 40 MG: 40 TABLET, DELAYED RELEASE ORAL at 22:16

## 2018-01-01 RX ADMIN — DEXAMETHASONE 2 MG: 4 TABLET ORAL at 22:17

## 2018-01-01 RX ADMIN — DEXTROSE, SODIUM CHLORIDE, AND POTASSIUM CHLORIDE 0.75 ML/KG/HR: 5; .9; .15 INJECTION INTRAVENOUS at 15:31

## 2018-01-01 RX ADMIN — ENALAPRIL MALEATE 20 MG: 10 TABLET ORAL at 09:39

## 2018-01-01 RX ADMIN — INSULIN LISPRO 2 UNITS: 100 INJECTION, SOLUTION INTRAVENOUS; SUBCUTANEOUS at 21:58

## 2018-01-01 RX ADMIN — DONEPEZIL HYDROCHLORIDE 10 MG: 5 TABLET, FILM COATED ORAL at 22:17

## 2018-01-01 RX ADMIN — INSULIN LISPRO 2 UNITS: 100 INJECTION, SOLUTION INTRAVENOUS; SUBCUTANEOUS at 16:30

## 2018-01-01 RX ADMIN — SODIUM CHLORIDE 1000 ML: 900 INJECTION, SOLUTION INTRAVENOUS at 10:17

## 2018-01-01 RX ADMIN — DOCUSATE SODIUM AND SENNOSIDES 2 TABLET: 8.6; 5 TABLET, FILM COATED ORAL at 21:26

## 2018-01-01 RX ADMIN — PANTOPRAZOLE SODIUM 40 MG: 40 TABLET, DELAYED RELEASE ORAL at 21:37

## 2018-01-01 RX ADMIN — DOCUSATE SODIUM AND SENNOSIDES 2 TABLET: 8.6; 5 TABLET, FILM COATED ORAL at 22:16

## 2018-01-01 RX ADMIN — DEXAMETHASONE 1 MG: 2 TABLET ORAL at 08:58

## 2018-01-01 RX ADMIN — DOCUSATE SODIUM AND SENNOSIDES 2 TABLET: 8.6; 5 TABLET, FILM COATED ORAL at 21:56

## 2018-01-01 RX ADMIN — INSULIN LISPRO 2 UNITS: 100 INJECTION, SOLUTION INTRAVENOUS; SUBCUTANEOUS at 08:57

## 2018-01-01 RX ADMIN — INSULIN LISPRO 2 UNITS: 100 INJECTION, SOLUTION INTRAVENOUS; SUBCUTANEOUS at 14:02

## 2018-01-01 RX ADMIN — DEXTROSE AND SODIUM CHLORIDE 67 ML/HR: 5; 900 INJECTION, SOLUTION INTRAVENOUS at 21:56

## 2018-01-01 RX ADMIN — ONDANSETRON 2 MG: 2 INJECTION INTRAMUSCULAR; INTRAVENOUS at 10:49

## 2018-01-01 RX ADMIN — DOCUSATE SODIUM AND SENNOSIDES 2 TABLET: 8.6; 5 TABLET, FILM COATED ORAL at 21:37

## 2018-01-01 RX ADMIN — DEXTROSE AND SODIUM CHLORIDE 67 ML/HR: 5; 900 INJECTION, SOLUTION INTRAVENOUS at 10:13

## 2018-01-01 RX ADMIN — FOLIC ACID 1 MG: 1 TABLET ORAL at 16:49

## 2018-01-01 RX ADMIN — LEVETIRACETAM 750 MG: 100 INJECTION INTRAVENOUS at 10:17

## 2018-01-01 RX ADMIN — DOCUSATE SODIUM AND SENNOSIDES 2 TABLET: 8.6; 5 TABLET, FILM COATED ORAL at 21:59

## 2018-01-01 RX ADMIN — DONEPEZIL HYDROCHLORIDE 10 MG: 5 TABLET, FILM COATED ORAL at 21:26

## 2018-01-01 RX ADMIN — PANTOPRAZOLE SODIUM 40 MG: 40 TABLET, DELAYED RELEASE ORAL at 22:05

## 2018-01-01 RX ADMIN — DEXAMETHASONE 2 MG: 4 TABLET ORAL at 22:00

## 2018-01-01 RX ADMIN — FOLIC ACID: 5 INJECTION, SOLUTION INTRAMUSCULAR; INTRAVENOUS; SUBCUTANEOUS at 17:45

## 2018-01-01 RX ADMIN — DEXAMETHASONE 1 MG: 2 TABLET ORAL at 09:39

## 2018-01-01 RX ADMIN — ONDANSETRON 2 MG: 2 INJECTION INTRAMUSCULAR; INTRAVENOUS at 09:47

## 2018-01-01 RX ADMIN — PANTOPRAZOLE SODIUM 40 MG: 40 TABLET, DELAYED RELEASE ORAL at 08:58

## 2018-01-01 RX ADMIN — PANTOPRAZOLE SODIUM 40 MG: 40 TABLET, DELAYED RELEASE ORAL at 09:39

## 2018-01-01 RX ADMIN — DEXTROSE AND SODIUM CHLORIDE 0.75 ML/KG/HR: 5; 900 INJECTION, SOLUTION INTRAVENOUS at 14:22

## 2018-01-01 RX ADMIN — DEXAMETHASONE 1 MG: 2 TABLET ORAL at 21:56

## 2018-01-01 RX ADMIN — ONDANSETRON 2 MG: 2 INJECTION INTRAMUSCULAR; INTRAVENOUS at 23:55

## 2018-01-01 RX ADMIN — ONDANSETRON 2 MG: 2 INJECTION INTRAMUSCULAR; INTRAVENOUS at 01:21

## 2018-01-01 RX ADMIN — DOCUSATE SODIUM AND SENNOSIDES 2 TABLET: 8.6; 5 TABLET, FILM COATED ORAL at 22:05

## 2018-01-01 RX ADMIN — INSULIN LISPRO 2 UNITS: 100 INJECTION, SOLUTION INTRAVENOUS; SUBCUTANEOUS at 17:16

## 2018-01-01 RX ADMIN — ONDANSETRON 2 MG: 2 INJECTION INTRAMUSCULAR; INTRAVENOUS at 13:07

## 2018-01-01 RX ADMIN — PANTOPRAZOLE SODIUM 40 MG: 40 TABLET, DELAYED RELEASE ORAL at 22:00

## 2018-01-01 RX ADMIN — INSULIN LISPRO 2 UNITS: 100 INJECTION, SOLUTION INTRAVENOUS; SUBCUTANEOUS at 13:14

## 2018-01-01 RX ADMIN — FOLIC ACID 1 MG: 1 TABLET ORAL at 17:07

## 2018-01-01 RX ADMIN — Medication 100 MG: at 09:39

## 2018-01-01 RX ADMIN — DEXAMETHASONE 2 MG: 4 TABLET ORAL at 10:01

## 2018-01-01 RX ADMIN — INSULIN LISPRO 2 UNITS: 100 INJECTION, SOLUTION INTRAVENOUS; SUBCUTANEOUS at 23:57

## 2018-01-12 NOTE — PROGRESS NOTES
Jiemnez Hollins is a 75 y.o.  female and presents with    Chief Complaint   Patient presents with    Diabetes     Subjective:  Hypertension  Patient is here for follow-up of hypertension. She indicates that she is feeling well and denies any symptoms referable to her hypertension. She is not exercising and is adherent to low salt diet. Blood pressure is well controlled at home. Use of agents associated with hypertension: none. Diabetes Mellitus:  She has diabetes mellitus, and  hypertension and obesity. Diabetic ROS - medication compliance: compliant all of the time, diabetic diet compliance: compliant most of the time, home glucose monitoring: is performed sporadically. Lab review: orders written for new lab studies as appropriate; see orders. She is taking donepezil for dementia. She is followed by Dr. Rito Bell and was scheduled for f/u in 2 weeks but this was rescheduled for April. Her daughter notes that dementia continues to progress. Patient Active Problem List   Diagnosis Code    DM (diabetes mellitus) ([facility] E11.9    Essential hypertension, benign I10    Morbid obesity ([facility] E66.01    Lymphoma in remission ([facility] C85.90    Advance care planning Z71.89     Patient Active Problem List    Diagnosis Date Noted    Advance care planning 07/06/2016    DM (diabetes mellitus) ([facility] 01/23/2014    Essential hypertension, benign 01/23/2014    Morbid obesity ([facility] 01/23/2014    Lymphoma in remission ([facility] 01/23/2014     Current Outpatient Prescriptions   Medication Sig Dispense Refill    DILT- mg XR capsule TAKE 1 CAPSULE EVERY DAY 90 Cap 0    levETIRAcetam (KEPPRA XR) 750 mg ER tablet Take 1 Tab by Mouth Every Night at Bedtime. 90 Tab 1    donepezil (ARICEPT) 10 mg tablet Take 10 mg by mouth nightly.  aspirin delayed-release 81 mg tablet Take 1 Tab by mouth daily. 90 Tab 1    enalapril (VASOTEC) 20 mg tablet Take 1 Tab by mouth daily.  90 Tab 3    ergocalciferol (ERGOCALCIFEROL) 50,000 unit capsule Take 1 Cap by mouth every seven (7) days. 12 Cap 3    NOVOLOG FLEXPEN 100 unit/mL inpn INJECT  30 UNITS SUBCUTANEOUSLY IN THE MORNING  AND  20 UNITS AT NIGHT FOR DIABETES 45 mL 1    citalopram (CELEXA) 20 mg tablet Take 1 Tab by mouth daily. Indications: major depressive disorder 30 Tab 11    canagliflozin (INVOKANA) 100 mg tablet Take 1 Tab by mouth Daily (before breakfast). 30 Tab 11    DUREZOL 0.05 % ophthalmic emulsion       IBUPROFEN/DIPHENHYDRAMINE CIT (ADVIL PM PO) Take  by mouth.  ILEVRO 0.3 % drps       ondansetron (ZOFRAN ODT) 4 mg disintegrating tablet Take 1 Tab by mouth every eight (8) hours as needed for Nausea. 20 Tab 0     Allergies   Allergen Reactions    Latex, Natural Rubber Other (comments)     Swelling in hands and lips    Iodine Anaphylaxis and Swelling     Past Medical History:   Diagnosis Date    Ankle fracture, right     Diabetes ([address]     Hypertension     Lymphoma in remission ([address] [facility]     2 spots in brain    Psychiatric disorder     Depression     Past Surgical History:   Procedure Laterality Date    HX HYSTERECTOMY       History reviewed. No pertinent family history.   Social History   Substance Use Topics    Smoking status: Former Smoker    Smokeless tobacco: Never Used    Alcohol use No       ROS   General ROS: negative for - chills or fever  Psychological ROS: negative for - anxiety or depression; sleeps more than usual  Ophthalmic ROS: negative for - blurry vision  ENT ROS: negative for - headaches  Endocrine ROS: negative for - polydipsia/polyuria or temperature intolerance  Respiratory ROS: no cough, shortness of breath, or wheezing  Cardiovascular ROS: no chest pain or dyspnea on exertion  Gastrointestinal ROS: positive for - constipation; hard stool with difficulty  Genito-Urinary ROS: no dysuria, trouble voiding, or hematuria  Musculoskeletal ROS: negative for - joint stiffness  Neurological ROS: negative for - numbness/tingling; uses walker for ambulation  Dermatological ROS: negative for - rash or skin lesion changes    All other systems reviewed and are negative. Objective:  Vitals:    01/12/18 0805   BP: 138/62   Pulse: 69   Resp: 18   Temp: 96.6 °F (35.9 °C)   TempSrc: Oral   SpO2: 95%   Weight: 198 lb (89.8 kg)   Height: 5' (1.524 m)   PainSc:   0 - No pain       alert, well appearing, and in no distress, oriented to person, place, and time and obese  Mental status - normal mood, behavior, speech, dress, motor activity, and thought processes  Chest - clear to auscultation, no wheezes, rales or rhonchi, symmetric air entry  Heart - normal rate, regular rhythm, normal S1, S2, no murmurs, rubs, clicks or gallops  Neurological - guarded gait using walker for assistance  LABS   hgb a1c 6.1    Assessment/Plan:    1. Slow transit constipation  Increase water intake and fiber; exercise as tolerated    2. Vascular dementia without behavioral disturbance  Started by neuropsychiatrist; continue current dose  - donepezil (ARICEPT) 10 mg tablet; Take 1 Tab by mouth nightly. Dispense: 90 Tab; Refill: 1    3. Class 2 obesity ([address]  I have reviewed/discussed the above normal BMI with the patient and caregiver. I have recommended the following interventions: dietary management education, guidance, and counseling and encourage exercise . Korin Vaughn 4. Type 2 diabetes mellitus with hyperglycemia, without long-term current use of insulin ([facility])  Goal hgb a1c <7; greatly improved with recent lifestyle changes; screen for nephropathy  - AMB POC HEMOGLOBIN A1C  - AMB POC URINE, MICROALBUMIN, SEMIQUANTITATIVE      Lab review: labs reviewed, I note that glycosylated hemoglobin normal, orders written for new lab studies as appropriate; see orders      I have discussed the diagnosis with the patient and the intended plan as seen in the above orders.   The patient has received an after-visit summary and questions were answered concerning future plans. I have discussed medication side effects and warnings with the patient as well. I have reviewed the plan of care with the patient, accepted their input and they are in agreement with the treatment goals. Follow-up Disposition:  Return in about 3 months (around 4/12/2018) for medicare wellness.

## 2018-01-12 NOTE — MR AVS SNAPSHOT
Visit Information Date & Time Provider Department Dept. Phone Encounter #  
 1/12/2018  8:15 AM Tamika Gonzalez, [facility] [phone] [id number] Follow-up Instructions Return in about 3 months (around 4/12/2018) for medicare wellness. Upcoming Health Maintenance Date Due Hepatitis C Screening 1950 COLONOSCOPY 10/3/1967 ZOSTER VACCINE AGE 60> 8/3/2009 MICROALBUMIN Q1 2/19/2017 LIPID PANEL Q1 2/19/2017 HEMOGLOBIN A1C Q6M 1/21/2018 MEDICARE YEARLY EXAM 4/15/2018 FOOT EXAM Q1 7/21/2018 FOBT Q 1 YEAR, 18+ 10/16/2018 EYE EXAM RETINAL OR DILATED Q1 10/17/2018 BREAST CANCER SCRN MAMMOGRAM 3/31/2019 GLAUCOMA SCREENING Q2Y 10/17/2019 DTaP/Tdap/Td series (2 - Td) 7/6/2026 Allergies as of 1/12/2018  Review Complete On: 1/12/2018 By: Tamika Gonzalez MD  
  
 Severity Noted Reaction Type Reactions Latex, Natural Rubber  07/30/2014    Other (comments) Swelling in hands and lips Iodine High 01/23/2014    Anaphylaxis, Swelling Current Immunizations  Reviewed on 9/30/2016 Name Date Influenza High Dose Vaccine PF 10/13/2017  2:44 PM, 9/30/2016 10:03 AM  
 Pneumococcal Conjugate (PCV-13) 9/30/2016 10:03 AM  
 Pneumococcal Polysaccharide (PPSV-23) 10/13/2017  2:44 PM  
  
 Not reviewed this visit You Were Diagnosed With   
  
 Codes Comments Slow transit constipation    -  Primary ICD-10-CM: K59.01 
ICD-9-CM: 564.01 Vascular dementia without behavioral disturbance     ICD-10-CM: F01.50 ICD-9-CM: 290.40 Morbid obesity ([facility] [address]     ICD-10-CM: E66.01 
ICD-9-CM: 278.01 Type 2 diabetes mellitus with hyperglycemia, without long-term current use of insulin (HCC)     ICD-10-CM: E11.65 ICD-9-CM: 250.00, 790.29 Vitals  BP Pulse Temp Resp Height(growth percentile) Weight(growth percentile)  
 138/62 (BP 1 Location: Right arm, BP Patient Position: Sitting) 69 96.6 °F (35.9 °C) (Oral) 18 5' (1.524 m) 198 lb (89.8 kg) SpO2 BMI OB Status Smoking Status 95% 38.67 kg/m2 Hysterectomy Former Smoker Vitals History BMI and BSA Data Body Mass Index Body Surface Area  
 38.67 kg/m 2 1.95 m 2 Preferred Pharmacy Pharmacy Name Phone Cesia [facility] [address] [phone] Your Updated Medication List  
  
   
This list is accurate as of: 1/12/18  8:38 AM.  Always use your most recent med list. ADVIL PM PO Take  by mouth. aspirin delayed-release 81 mg tablet Take 1 Tab by mouth daily. canagliflozin 100 mg tablet Commonly known as:  Vallorie Heft Take 1 Tab by mouth Daily (before breakfast). citalopram 20 mg tablet Commonly known as:  Kami Carbine Take 1 Tab by mouth daily. Indications: major depressive disorder DILT- mg XR capsule Generic drug:  dilTIAZem XR  
TAKE 1 CAPSULE EVERY DAY  
  
 donepezil 10 mg tablet Commonly known as:  ARICEPT Take 1 Tab by mouth nightly. DUREZOL 0.05 % ophthalmic emulsion Generic drug:  Difluprednate  
  
 enalapril 20 mg tablet Commonly known as:  Alanis Bryan Take 1 Tab by mouth daily. ergocalciferol 50,000 unit capsule Commonly known as:  ERGOCALCIFEROL Take 1 Cap by mouth every seven (7) days. ILEVRO 0.3 % Drps Generic drug:  nepafenac  
  
 levETIRAcetam 750 mg ER tablet Commonly known as:  KEPPRA XR Take 1 Tab by Mouth Every Night at Bedtime. NovoLOG Flexpen 100 unit/mL Inpn Generic drug:  insulin aspart INJECT  30 UNITS SUBCUTANEOUSLY IN THE MORNING  AND  20 UNITS AT NIGHT FOR DIABETES  
  
 ondansetron 4 mg disintegrating tablet Commonly known as:  ZOFRAN ODT Take 1 Tab by mouth every eight (8) hours as needed for Nausea. Prescriptions Sent to Pharmacy Refills  
 donepezil (ARICEPT) 10 mg tablet 1 Sig: Take 1 Tab by mouth nightly. Class: Normal  
 Pharmacy: [address] [place] [address], [facility] [address] [facility] #: [phone] Route: Oral  
  
We Performed the Following AMB POC HEMOGLOBIN A1C [61519 CPT(R)] AMB POC URINE, MICROALBUMIN, SEMIQUANTITATIVE [46670 CPT(R)] Follow-up Instructions Return in about 3 months (around 4/12/2018) for medicare wellness. Patient Instructions Constipation: Care Instructions Your Care Instructions Constipation means that you have a hard time passing stools (bowel movements). People pass stools from 3 times a day to once every 3 days. What is normal for you may be different. Constipation may occur with pain in the rectum and cramping. The pain may get worse when you try to pass stools. Sometimes there are small amounts of bright red blood on toilet paper or the surface of stools. This is because of enlarged veins near the rectum (hemorrhoids). A few changes in your diet and lifestyle may help you avoid ongoing constipation. Your doctor may also prescribe medicine to help loosen your stool. Some medicines can cause constipation. These include pain medicines and antidepressants. Tell your doctor about all the medicines you take. Your doctor may want to make a medicine change to ease your symptoms. Follow-up care is a key part of your treatment and safety. Be sure to make and go to all appointments, and call your doctor if you are having problems. It's also a good idea to know your test results and keep a list of the medicines you take. How can you care for yourself at home? · Drink plenty of fluids, enough so that your urine is light yellow or clear like water. If you have kidney, heart, or liver disease and have to limit fluids, talk with your doctor before you increase the amount of fluids you drink. · Include high-fiber foods in your diet each day. These include fruits, vegetables, beans, and whole grains. · Get at least 30 minutes of exercise on most days of the week. Walking is a good choice. You also may want to do other activities, such as running, swimming, cycling, or playing tennis or team sports. · Take a fiber supplement, such as Citrucel or Metamucil, every day. Read and follow all instructions on the label. · Schedule time each day for a bowel movement. A daily routine may help. Take your time having your bowel movement. · Support your feet with a small step stool when you sit on the toilet. This helps flex your hips and places your pelvis in a squatting position. · Your doctor may recommend an over-the-counter laxative to relieve your constipation. Examples are Milk of Magnesia and MiraLax. Read and follow all instructions on the label. Do not use laxatives on a long-term basis. When should you call for help? Call your doctor now or seek immediate medical care if: 
? · You have new or worse belly pain. ? · You have new or worse nausea or vomiting. ? · You have blood in your stools. ? Watch closely for changes in your health, and be sure to contact your doctor if: 
? · Your constipation is getting worse. ? · You do not get better as expected. Where can you learn more? Go to http://elizabeth-demetris.info/. Enter [phone] in the search box to learn more about \"Constipation: Care Instructions. \" Current as of: March 20, 2017 Content Version: 11.4 © 4168-0876 [employer]. Care instructions adapted under license by [employer] (which disclaims liability or warranty for this information). If you have questions about a medical condition or this instruction, always ask your healthcare professional. [address] any warranty or liability for your use of this information. Introducing [facility]! Dear Mart Olivera: Thank you for requesting a [employer] account.   Our records indicate that you already have an active [employer] account. You can access your account anytime at [email or web address] Did you know that you can access your hospital and ER discharge instructions at any time in [employer]? You can also review all of your test results from your hospital stay or ER visit. Additional Information If you have questions, please visit the Frequently Asked Questions section of the [employer] website at [email or web address] Remember, [employer] is NOT to be used for urgent needs. For medical emergencies, dial 911. Now available from your iPhone and Android! Please provide this summary of care documentation to your next provider. Your primary care clinician is listed as Luda Vargas. If you have any questions after today's visit, please call [phone].

## 2018-01-12 NOTE — PROGRESS NOTES
Alberto Drake is a 75 y.o. female  Chief Complaint   Patient presents with    Diabetes     1. Have you been to the ER, urgent care clinic since your last visit? Hospitalized since your last visit? No    2. Have you seen or consulted any other health care providers outside of the [address] [place] [address] since your last visit? Include any pap smears or colon screening.  No

## 2018-01-12 NOTE — PATIENT INSTRUCTIONS
Constipation: Care Instructions  Your Care Instructions    Constipation means that you have a hard time passing stools (bowel movements). People pass stools from 3 times a day to once every 3 days. What is normal for you may be different. Constipation may occur with pain in the rectum and cramping. The pain may get worse when you try to pass stools. Sometimes there are small amounts of bright red blood on toilet paper or the surface of stools. This is because of enlarged veins near the rectum (hemorrhoids). A few changes in your diet and lifestyle may help you avoid ongoing constipation. Your doctor may also prescribe medicine to help loosen your stool. Some medicines can cause constipation. These include pain medicines and antidepressants. Tell your doctor about all the medicines you take. Your doctor may want to make a medicine change to ease your symptoms. Follow-up care is a key part of your treatment and safety. Be sure to make and go to all appointments, and call your doctor if you are having problems. It's also a good idea to know your test results and keep a list of the medicines you take. How can you care for yourself at home? · Drink plenty of fluids, enough so that your urine is light yellow or clear like water. If you have kidney, heart, or liver disease and have to limit fluids, talk with your doctor before you increase the amount of fluids you drink. · Include high-fiber foods in your diet each day. These include fruits, vegetables, beans, and whole grains. · Get at least 30 minutes of exercise on most days of the week. Walking is a good choice. You also may want to do other activities, such as running, swimming, cycling, or playing tennis or team sports. · Take a fiber supplement, such as Citrucel or Metamucil, every day. Read and follow all instructions on the label. · Schedule time each day for a bowel movement. A daily routine may help.  Take your time having your bowel movement. · Support your feet with a small step stool when you sit on the toilet. This helps flex your hips and places your pelvis in a squatting position. · Your doctor may recommend an over-the-counter laxative to relieve your constipation. Examples are Milk of Magnesia and MiraLax. Read and follow all instructions on the label. Do not use laxatives on a long-term basis. When should you call for help? Call your doctor now or seek immediate medical care if:  ? · You have new or worse belly pain. ? · You have new or worse nausea or vomiting. ? · You have blood in your stools. ? Watch closely for changes in your health, and be sure to contact your doctor if:  ? · Your constipation is getting worse. ? · You do not get better as expected. Where can you learn more? Go to http://elizabeth-demetris.info/. Enter 21 [phone] in the search box to learn more about \"Constipation: Care Instructions. \"  Current as of: March 20, 2017  Content Version: 11.4  © 2487-1385 "[employer]. Care instructions adapted under license by [employer] (which disclaims liability or warranty for this information). If you have questions about a medical condition or this instruction, always ask your healthcare professional. [address] any warranty or liability for your use of this information.

## 2018-02-26 NOTE — PROGRESS NOTES
Candance Marion is a 75 y.o.  female and presents with    Chief Complaint   Patient presents with   [address] [facility] [address] Fall    Altered mental status    Sore Throat     Subjective:  Ms. Juan Walter presents with falls 4 times over the past several days. She was found on the floor 3 days ago by her son in law. She has had softer speech with some slurring. She was using a walker by her son in law who followed her around all weekend; she ate once yesterday. She missed her dose of medications last week. She has not used her medications this morning. She had home health ordered but not action taken. Her daughter emailed the  at the neuropsychiatrist who recommended [facility]    She had blood glucose 135 this morning. High 250. She has had confusion with recognition, repetitive; Patient Active Problem List   Diagnosis Code    DM (diabetes mellitus) ([facility] [address] E11.9    Essential hypertension, benign I10    Class 2 obesity due to excess calories with serious comorbidity and body mass index (BMI) of 38.0 to 38.9 in adult E66.09, Z68.38    Lymphoma in remission ([facility] [address] C85.90    Advance care planning Z71.89     Patient Active Problem List    Diagnosis Date Noted    Advance care planning 07/06/2016    DM (diabetes mellitus) ([facility] [address] 01/23/2014    Essential hypertension, benign 01/23/2014    Class 2 obesity due to excess calories with serious comorbidity and body mass index (BMI) of 38.0 to 38.9 in adult 01/23/2014    Lymphoma in remission ([facility] [address] 01/23/2014     Current Outpatient Prescriptions   Medication Sig Dispense Refill    donepezil (ARICEPT) 10 mg tablet Take 1 Tab by mouth nightly. 90 Tab 1    DILT- mg XR capsule TAKE 1 CAPSULE EVERY DAY 90 Cap 0    levETIRAcetam (KEPPRA XR) 750 mg ER tablet Take 1 Tab by Mouth Every Night at Bedtime. 90 Tab 1    aspirin delayed-release 81 mg tablet Take 1 Tab by mouth daily.  90 Tab 1    enalapril (VASOTEC) 20 mg tablet Take 1 Tab by mouth daily. 90 Tab 3    ergocalciferol (ERGOCALCIFEROL) 50,000 unit capsule Take 1 Cap by mouth every seven (7) days. 12 Cap 3    NOVOLOG FLEXPEN 100 unit/mL inpn INJECT  30 UNITS SUBCUTANEOUSLY IN THE MORNING  AND  20 UNITS AT NIGHT FOR DIABETES 45 mL 1    citalopram (CELEXA) 20 mg tablet Take 1 Tab by mouth daily. Indications: major depressive disorder 30 Tab 11    canagliflozin (INVOKANA) 100 mg tablet Take 1 Tab by mouth Daily (before breakfast). 30 Tab 11    IBUPROFEN/DIPHENHYDRAMINE CIT (ADVIL PM PO) Take  by mouth.  DUREZOL 0.05 % ophthalmic emulsion       ILEVRO 0.3 % drps       ondansetron (ZOFRAN ODT) 4 mg disintegrating tablet Take 1 Tab by mouth every eight (8) hours as needed for Nausea. 20 Tab 0     Allergies   Allergen Reactions    Latex, Natural Rubber Other (comments)     Swelling in hands and lips    Iodine Anaphylaxis and Swelling     Past Medical History:   Diagnosis Date    Ankle fracture, right     Diabetes ([facility] [address]     Hypertension     Lymphoma in remission ([facility] [address]     2 spots in brain    Psychiatric disorder     Depression     Past Surgical History:   Procedure Laterality Date    HX HYSTERECTOMY       No family history on file.   Social History   Substance Use Topics    Smoking status: Former Smoker    Smokeless tobacco: Never Used    Alcohol use No       ROS   Questions asked however patient is unsure with the answers General ROS: negative for - chills or fever  Psychological ROS: negative for - anxiety or depression; sleeps more than usual  Ophthalmic ROS: negative for - blurry vision  ENT ROS: negative for - headaches  Endocrine ROS: negative for - polydipsia/polyuria or temperature intolerance  Respiratory ROS: no cough, shortness of breath, or wheezing  Cardiovascular ROS: no chest pain or dyspnea on exertion  Gastrointestinal ROS: positive for - constipation; hard stool with difficulty  Genito-Urinary ROS: no dysuria, trouble voiding, or hematuria  Musculoskeletal ROS: negative for - joint stiffness  Neurological ROS: negative for - numbness/tingling; uses walker for ambulation  Dermatological ROS: negative for - rash or skin lesion changes    All other systems reviewed and are negative. Objective:  Vitals:    02/26/18 0925   BP: 143/73   Pulse: 78   Resp: 16   Temp: 97.1 °F (36.2 °C)   TempSrc: Oral   SpO2: 97%   Weight: 194 lb 9.6 oz (88.3 kg)   Height: 5' (1.524 m)   PainSc:   6   PainLoc: Leg     Confused; oriented to person and place; obese  obese  Mental status - disoriented to time  Mouth - mucous membranes moist, pharynx normal without lesions; no asymmetry  Chest - clear to auscultation, no wheezes, rales or rhonchi, symmetric air entry  Heart - normal rate, regular rhythm, normal S1, S2, no murmurs, rubs, clicks or gallops  Neurological - sitting in wheel chair; unsteady when standing  Skin - ecchymosis right upper leg    Assessment/Plan:    1. Sore throat  Salt water gargle as tolerated  - AMB POC RAPID STREP A  - AMB POC RAPID INFLUENZA TEST    2. Fall, initial encounter  Use wheel chair with assisted transfers; home health and aid needed; imaging ordered  - [id number] CONT; Future    3. Dementia without behavioral disturbance, unspecified dementia type  Needs more care than can be given by family; medical social worker requested  - [address] CONT; Future    4. Need for home health care    - [address]    5. Unstable gait  Needs wheelchair  - AMB SUPPLY ORDER      Lab review: no lab studies available for review at time of visit      I have discussed the diagnosis with the patient and the intended plan as seen in the above orders. The patient has received an after-visit summary and questions were answered concerning future plans. I have discussed medication side effects and warnings with the patient as well.  I have reviewed the plan of care with the patient, accepted their input and they are in agreement with the treatment goals. Follow-up Disposition:  Return in about 1 week (around 3/5/2018), or if symptoms worsen or fail to improve. More than 1/2 of this 40 minute visit was spent in counselling and coordination of care, as described above.

## 2018-02-26 NOTE — PROGRESS NOTES
Marietta Pastrana is a 75 y.o. female presented to clinic accompanied by daughter and grandson for a mobility issues and dementia. Pt's daughter states patient had 4 falls over the weekend and to possible mishaps this morning. Pt states her right leg feels heavy with numbness and tingling arturo to her toes. 1. Have you been to the ER, urgent care clinic since your last visit? Hospitalized since your last visit? No    2. Have you seen or consulted any other health care providers outside of the [address] [place] [address] since your last visit? Include any pap smears or colon screening.  No     Learning Assessment 7/22/2016   PRIMARY LEARNER Patient   HIGHEST LEVEL OF EDUCATION - PRIMARY LEARNER  GRADUATED HIGH SCHOOL OR GED   BARRIERS PRIMARY LEARNER NONE   CO-LEARNER CAREGIVER Yes   CO-LEARNER NAME John Courtney    CO-LEARNER HIGHEST LEVEL OF EDUCATION > 4 YEARS OF COLLEGE   BARRIERS CO-LEARNER NONE   PRIMARY LANGUAGE ENGLISH   PRIMARY LANGUAGE CO-LEARNER ENGLISH    NEED No   LEARNER PREFERENCE PRIMARY READING     -   LEARNER PREFERENCE CO-LEARNER DEMONSTRATION   ANSWERED BY self   RELATIONSHIP SELF     Health Maintenance Due   Topic Date Due    Hepatitis C Screening  1950    COLONOSCOPY  10/03/1967    ZOSTER VACCINE AGE 60>  08/03/2009    MICROALBUMIN Q1  02/19/2017    LIPID PANEL Q1  02/19/2017

## 2018-02-26 NOTE — MR AVS SNAPSHOT
[address] [facility], operated by [facility] 
 
 
 [address] [facility] [place] [facility] [place] [address] [place] [address] 83 [phone] 
[phone] Patient: Danelle Frankel MRN: [id number] PKT:[id number] Visit Information Date & Time Provider Department Dept. Phone Encounter #  
 2/26/2018  9:30 AM Jia Chanel, [address] [facility] [phone] [id number] Follow-up Instructions Return in about 1 week (around 3/5/2018), or if symptoms worsen or fail to improve. Your Appointments 4/13/2018  9:45 AM  
Medicare Physical with Jia Chanel MD  
[address] [address]) Appt Note: Return in about 3 months (around 4/12/2018) for medicare wellness. [address] [facility] [place] [facility] [place] [address] [facility] [address] [place]  
  
   
 [address] [place] [address] [facility] Upcoming Health Maintenance Date Due Hepatitis C Screening 1950 COLONOSCOPY 10/3/1967 ZOSTER VACCINE AGE 60> 8/3/2009 MICROALBUMIN Q1 2/19/2017 LIPID PANEL Q1 2/19/2017 MEDICARE YEARLY EXAM 4/15/2018 HEMOGLOBIN A1C Q6M 7/12/2018 FOOT EXAM Q1 7/21/2018 FOBT Q 1 YEAR, 18+ 10/16/2018 EYE EXAM RETINAL OR DILATED Q1 10/17/2018 BREAST CANCER SCRN MAMMOGRAM 3/31/2019 GLAUCOMA SCREENING Q2Y 10/17/2019 DTaP/Tdap/Td series (2 - Td) 7/6/2026 Allergies as of 2/26/2018  Review Complete On: 2/26/2018 By: Jia Chanel MD  
  
 Severity Noted Reaction Type Reactions Latex, Natural Rubber  07/30/2014    Other (comments) Swelling in hands and lips Iodine High 01/23/2014    Anaphylaxis, Swelling Current Immunizations  Reviewed on 9/30/2016 Name Date Influenza High Dose Vaccine PF 10/13/2017  2:44 PM, 9/30/2016 10:03 AM  
 Pneumococcal Conjugate (PCV-13) 9/30/2016 10:03 AM  
 Pneumococcal Polysaccharide (PPSV-23) 10/13/2017  2:44 PM  
  
 Not reviewed this visit You Were Diagnosed With   
  
 Codes Comments Sore throat    -  Primary ICD-10-CM: J02.9 ICD-9-CM: 581 Fall, initial encounter     ICD-10-CM: W19Haja Reid Sep ICD-9-CM: E888.9 Dementia without behavioral disturbance, unspecified dementia type     ICD-10-CM: F03.90 ICD-9-CM: 294.20 Need for home health care     ICD-10-CM: G20.9 ICD-9-CM: V60.4 Unstable gait     ICD-10-CM: R26.81 
ICD-9-CM: 471. 2 Vitals BP Pulse Temp Resp Height(growth percentile) Weight(growth percentile) 143/73 (BP 1 Location: Left arm, BP Patient Position: Sitting) 78 97.1 °F (36.2 °C) (Oral) 16 5' (1.524 m) 194 lb 9.6 oz (88.3 kg) SpO2 BMI OB Status Smoking Status 97% 38.01 kg/m2 Hysterectomy Former Smoker BMI and BSA Data Body Mass Index Body Surface Area 38.01 kg/m 2 1.93 m 2 Preferred Pharmacy Pharmacy Name Phone [address] [phone] Your Updated Medication List  
  
   
This list is accurate as of 2/26/18  9:59 AM.  Always use your most recent med list. ADVIL PM PO Take  by mouth. aspirin delayed-release 81 mg tablet Take 1 Tab by mouth daily. canagliflozin 100 mg tablet Commonly known as:  Akila Dienes Take 1 Tab by mouth Daily (before breakfast). citalopram 20 mg tablet Commonly known as:  Marijane Chasten Take 1 Tab by mouth daily. Indications: major depressive disorder DILT- mg XR capsule Generic drug:  dilTIAZem XR  
TAKE 1 CAPSULE EVERY DAY  
  
 donepezil 10 mg tablet Commonly known as:  ARICEPT Take 1 Tab by mouth nightly. DUREZOL 0.05 % ophthalmic emulsion Generic drug:  Difluprednate  
  
 enalapril 20 mg tablet Commonly known as:  Hannah Gums Take 1 Tab by mouth daily. ergocalciferol 50,000 unit capsule Commonly known as:  ERGOCALCIFEROL Take 1 Cap by mouth every seven (7) days. ILEVRO 0.3 % Drps Generic drug:  nepafenac  
  
 levETIRAcetam 750 mg ER tablet Commonly known as:  KEPPRA XR  
 Take 1 Tab by Mouth Every Night at Bedtime. NovoLOG Flexpen U-100 Insulin 100 unit/mL Inpn Generic drug:  insulin aspart U-100 INJECT  30 UNITS SUBCUTANEOUSLY IN THE MORNING  AND  20 UNITS AT NIGHT FOR DIABETES  
  
 ondansetron 4 mg disintegrating tablet Commonly known as:  ZOFRAN ODT Take 1 Tab by mouth every eight (8) hours as needed for Nausea. We Performed the Following AMB POC RAPID INFLUENZA TEST [21432 CPT(R)] AMB POC RAPID STREP A [02587 CPT(R)] AMB SUPPLY ORDER [[id number] Custom] Comments:  
 Wheelchair for unstable gait [address] Comments:  
 Please evaluate 74 y/o female patient for dementia and falls; she needs physical therapy and medical social worker. REFERRAL TO SPEECH THERAPY [QKT000 Custom] Comments:  
 Please evaluate 74 y/o female for cognitive wellness program  
  
Follow-up Instructions Return in about 1 week (around 3/5/2018), or if symptoms worsen or fail to improve. To-Do List   
 02/26/2018 Imaging:  MRI BRAIN WO CONT Referral Information Referral ID Referred By Referred To  
  
 [id number] Dayne HESS Not Available Visits Status Start Date End Date 1 New Request 2/26/18 2/26/19 If your referral has a status of pending review or denied, additional information will be sent to support the outcome of this decision. Referral ID Referred By Referred To  
 [id number] Dayne HESS Not Available Visits Status Start Date End Date 1 New Request 2/26/18 2/26/19 If your referral has a status of pending review or denied, additional information will be sent to support the outcome of this decision. Referral ID Referred By Referred To  
 [id number] SABINE HESS Not Available Visits Status Start Date End Date 1 New Request 2/26/18 2/26/19  If your referral has a status of pending review or denied, additional information will be sent to support the outcome of this decision. Introducing [facility] & HEALTH SERVICES! Dear Corbin Knapp: Thank you for requesting a [employer] account. Our records indicate that you already have an active [employer] account. You can access your account anytime at [email or web address] Did you know that you can access your hospital and ER discharge instructions at any time in [employer]? You can also review all of your test results from your hospital stay or ER visit. Additional Information If you have questions, please visit the Frequently Asked Questions section of the [employer] website at [email or web address] Remember, [employer] is NOT to be used for urgent needs. For medical emergencies, dial 911. Now available from your iPhone and Android! Please provide this summary of care documentation to your next provider. Your primary care clinician is listed as Washington Rincon. If you have any questions after today's visit, please call [phone].

## 2018-03-01 NOTE — TELEPHONE ENCOUNTER
Bella from [facility] just want to let Dr. Luma Guadarrama know that they did a medication reconciliation. She found out that there is a severe med interaction between celexa and leo. Asking to be called back.

## 2018-03-01 NOTE — PROGRESS NOTES
DOCUMENTATION ONLY: [facility] sent over a fax requesting an order for OT evaluation. Order was signed and completed by Dr. Concepcion Olguin. Paperwork was faxed to [phone]. Fax confirmation was sent to central scanning.

## 2018-03-01 NOTE — TELEPHONE ENCOUNTER
Joel Miller from [facility] called wanted to let dr harris know that there was a drug interaction between aricept and celexa and that the patient is also on a sliding scale insulin which is novolog. Spoke with Dr harris he stated he is aware and patient has been on these medication for some time. Per Pedro Portillo she just had to make sure.

## 2018-03-06 PROBLEM — I63.9 CVA (CEREBRAL VASCULAR ACCIDENT) (HCC): Status: ACTIVE | Noted: 2018-01-01

## 2018-03-06 NOTE — MR AVS SNAPSHOT
[place] 
 
 
 [address] [place] [address] [place] [address] 83 [phone] 
[phone] Patient: Ligia Latham MRN: [id number]:[id number] Visit Information Date & Time Provider Department Dept. Phone Encounter #  
 3/6/2018  8:00 AM Concepcion Benavidez 5501 [facility] [id number] Follow-up Instructions Return if symptoms worsen or fail to improve. Your Appointments 4/13/2018  9:45 AM  
Medicare Physical with Concepcion Benavidez MD  
[address] [address]) Appt Note: Return in about 3 months (around 4/12/2018) for medicare wellness. [address] [place] [address] [place] [address] 83 [address]  
  
   
 [address] [place] [address] [facility] Upcoming Health Maintenance Date Due Hepatitis C Screening 1950 COLONOSCOPY 10/3/1967 ZOSTER VACCINE AGE 60> 8/3/2009 MICROALBUMIN Q1 2/19/2017 LIPID PANEL Q1 2/19/2017 MEDICARE YEARLY EXAM 4/15/2018 HEMOGLOBIN A1C Q6M 7/12/2018 FOOT EXAM Q1 7/21/2018 FOBT Q 1 YEAR, 18+ 10/16/2018 EYE EXAM RETINAL OR DILATED Q1 10/17/2018 BREAST CANCER SCRN MAMMOGRAM 3/31/2019 GLAUCOMA SCREENING Q2Y 10/17/2019 DTaP/Tdap/Td series (2 - Td) 7/6/2026 Allergies as of 3/6/2018  Review Complete On: 2/26/2018 By: Concepcion Benavidez MD  
  
 Severity Noted Reaction Type Reactions Latex, Natural Rubber  07/30/2014    Other (comments) Swelling in hands and lips Iodine High 01/23/2014    Anaphylaxis, Swelling Current Immunizations  Reviewed on 9/30/2016 Name Date Influenza High Dose Vaccine PF 10/13/2017  2:44 PM, 9/30/2016 10:03 AM  
 Pneumococcal Conjugate (PCV-13) 9/30/2016 10:03 AM  
 Pneumococcal Polysaccharide (PPSV-23) 10/13/2017  2:44 PM  
  
 Not reviewed this visit You Were Diagnosed With   
  
 Codes Comments Dysphagia, unspecified type    -  Primary ICD-10-CM: R13.10 ICD-9-CM: 787.20 Fall, subsequent encounter     ICD-10-CM: W19Haja Hensley ICD-9-CM: V58.89, E888.9 Type 2 diabetes mellitus with hyperglycemia, without long-term current use of insulin (HCC)     ICD-10-CM: E11.65 ICD-9-CM: 250.00, 790.29 Vitals BP Pulse Temp Resp Height(growth percentile) Weight(growth percentile) 166/76 (BP 1 Location: Right arm, BP Patient Position: Sitting) 78 96.3 °F (35.7 °C) (Oral) 17 5' (1.524 m) 194 lb (88 kg) SpO2 BMI OB Status Smoking Status 97% 37.89 kg/m2 Hysterectomy Former Smoker BMI and BSA Data Body Mass Index Body Surface Area  
 37.89 kg/m 2 1.93 m 2 [facility] [address] [facility] [address] [phone] Your Updated Medication List  
  
   
This list is accurate as of 3/6/18  8:35 AM.  Always use your most recent med list. ADVIL PM PO Take  by mouth. aspirin delayed-release 81 mg tablet Take 1 Tab by mouth daily. canagliflozin 100 mg tablet Commonly known as:  Soraya Boer Take 1 Tab by mouth Daily (before breakfast). citalopram 20 mg tablet Commonly known as:  Sandra [place] Take 1 Tab by mouth daily. Indications: major depressive disorder DILT- mg XR capsule Generic drug:  dilTIAZem XR  
TAKE 1 CAPSULE EVERY DAY  
  
 donepezil 10 mg tablet Commonly known as:  ARICEPT Take 1 Tab by mouth nightly. DUREZOL 0.05 % ophthalmic emulsion Generic drug:  Difluprednate  
  
 enalapril 20 mg tablet Commonly known as:  Tressia Flavors Take 1 Tab by mouth daily. ILEVRO 0.3 % Drps Generic drug:  nepafenac  
  
 levETIRAcetam 750 mg ER tablet Commonly known as:  KEPPRA XR Take 1 Tab by Mouth Every Night at Bedtime. * insulin aspart U-100 100 unit/mL Inpn Commonly known as:  NOVOLOG  
12 Units by SubCUTAneous route three (3) times daily. * insulin aspart U-100 100 unit/mL Inpn Commonly known as:  NOVOLOG  
15 Units by SubCUTAneous route as needed (sliding scale insulin). -200 * insulin aspart U-100 100 unit/mL Inpn Commonly known as:  NOVOLOG  
18 Units by SubCUTAneous route as needed (sliding scale insulin). -250 * insulin aspart U-100 100 unit/mL Inpn Commonly known as:  NOVOLOG  
21 Units by SubCUTAneous route as needed (insulin sliding scale). -300 * insulin aspart U-100 100 unit/mL Inpn Commonly known as:  NOVOLOG  
24 Units by SubCUTAneous route as needed (sliding scale insulin). -350 * NovoLOG Flexpen U-100 Insulin 100 unit/mL Inpn Generic drug:  insulin aspart U-100 INJECT  30 UNITS SUBCUTANEOUSLY IN THE MORNING  AND  20 UNITS AT NIGHT FOR DIABETES  
  
 ondansetron 4 mg disintegrating tablet Commonly known as:  ZOFRAN ODT Take 1 Tab by mouth every eight (8) hours as needed for Nausea. VITAMIN D2 50,000 unit capsule Generic drug:  ergocalciferol TAKE 1 CAPSULE  EVERY  7  DAYS * Notice: This list has 6 medication(s) that are the same as other medications prescribed for you. Read the directions carefully, and ask your doctor or other care provider to review them with you. Follow-up Instructions Return if symptoms worsen or fail to improve. To-Do List   
 03/06/2018 Imaging:  XR BA SWALLOW ESOPHOGRAM   
  
 03/06/2018 12:00 PM  
  Appointment with Landon Bolanos PTA at [address] [place] [address] SCHEDULING/INTAKE  
  
 03/07/2018 To Be Determined Appointment with Payal Cordova at [facility]  
  
 03/07/2018 8:15 AM  
  Appointment with [facility] MRI RM 1 at [address] [place] [address] MRI ([phone]) GENERAL INSTRUCTIONS  Bring information (ID card) if you have any medically implanted devices. You will be required to lie still while the procedure is being performed.   Remove any jewelry (including body piercing, hairpins) prior to MRI. If you have had a creatinine level drawn within the past 30 days, please bring most recent results to your appt. Bring any films, CD's, and reports related to your study with you on the day of your exam.  This only includes studies done outside of [address] [place] [address], [facility], [facility] , and [place]. Bring a complete list of all medications you are currently taking to include prescriptions, over-the-counter meds, herbals, vitamins & any dietary supplements. If you were given medications for claustrophobia or anxiety, please arrange to have someone drive you to your appointment. QUESTIONS  Notify the MRI Department if you have any questions concerning your study. Feliz Choi  - [phone]  
  
 03/07/2018 2:00 PM  
  Appointment with PEÑA Fierro at [address] [place] [address] REG MED CTR  
  
 03/08/2018 To Be Determined Appointment with Lefty Masters PTA at [address] [place] [address] REG MED CTR  
  
 03/12/2018 To Be Determined Appointment with PEÑA Fierro at [address] [place] [address] REG MED [facility]  
  
 03/13/2018 To Be Determined Appointment with Lefty Masters PTA at [address] [place] [address] REG [facility]  
  
 03/14/2018 To Be Determined Appointment with PEÑA Fierro at [address] [place] [address] [facility]  
  
 03/16/2018 To Be Determined Appointment with Lefty Masters PTA at [address] [place] [address] [facility]  
  
 03/19/2018 To Be Determined Appointment with PEÑA Fierro at [address] [place] [address] [facility]  
  
 03/20/2018 To Be Determined Appointment with Lefty Masters PTA at [address] [place] [address] [facility]  
  
 03/21/2018 To Be Determined Appointment with PEÑA Mccray at [address] [facility]  
  
 03/23/2018 To Be Determined Appointment with Karly Hector PTA at [address] [facility]  
  
 03/27/2018 To Be Determined Appointment with Karly Hector PTA at [address] [facility] Patient Instructions Learning About the Diet for Swallowing Problems What are swallowing problems? Difficulty swallowing is also called dysphagia (say \"hlg-UEO-sda-uh\"). It is most often a sign of a problem with your throat or esophagus. This is the tube that moves food and liquids from the back of your mouth to your stomach. Trouble swallowing can occur when the muscles and nerves that move food through the throat and esophagus are not working right. To help you swallow food, your doctor or speech therapist may advise a special dysphagia diet for you. Why is a special diet important? A dysphagia diet can help you handle some problems that can occur when it's hard to swallow food and liquids easily. These problems can include: · Malnutrition. This means you aren't getting enough healthy foods to keep your body working well. · Dehydration. This means you aren't getting enough liquids to keep your body healthy. · Aspiration. This means that food, liquid, or saliva goes down your windpipe (trachea) into your lungs, instead of down your esophagus to your stomach. This can lead to aspiration pneumonia, which is an inflammation of the lungs. What is the dysphagia diet? In the dysphagia diet, you change the foods you eat and the liquids you drink to make it easier to swallow them. You may: · Change the texture of the foods you eat. Your doctor or speech therapist may advise you to eat one of these types of foods: 
¨ Solid, soft foods that have been cut up into small pieces.  Extra gravy or sauce can help make these foods easier to swallow. ¨ Semi-solid foods, like ground meats or fruits and vegetables that are mashed with a fork. These foods require some chewing. Extra gravy or sauce is often served. ¨ Foods that are the texture of pudding. You don't have to chew these foods. Examples include mashed potatoes with gravy; yogurt; pudding; and pureed meats, fruits, and vegetables. · Thicken the liquids you drink. Your doctor or speech therapist will tell you what kind of thickener to use and how thick to make the liquids. ¨ Nectar-thick liquids leave a thin coating when they are poured from a spoon. ¨ Honey-thick liquids drip slowly when they are poured from a spoon. ¨ Pudding-thick liquids do not pour from a spoon. Your speech therapist will help you learn exercises to train your muscles to work together so you can swallow. You may also need to learn how to position your body or how to put food in your mouth to be able to swallow better. Some people have severe trouble swallowing and can't get enough food and liquids. In those cases, the doctor can insert a feeding tube so the person gets enough nutrients. Follow-up care is a key part of your treatment and safety. Be sure to make and go to all appointments, and call your doctor if you are having problems. It's also a good idea to know your test results and keep a list of the medicines you take. Where can you learn more? Go to http://elizabeth-demetris.info/. Enter U034 in the search box to learn more about \"Learning About the Diet for Swallowing Problems. \" Current as of: March 20, 2017 Content Version: 11.4 © 3863-7233 [employer]. Care instructions adapted under license by [employer] (which disclaims liability or warranty for this information).  If you have questions about a medical condition or this instruction, always ask your healthcare professional. Rekha Maynard, [facility] disclaims any warranty or liability for your use of this information. Learning About Amanda Johnston Why are pureed foods important? Puree (say \"pyuh-RAY\") is a way to turn solid foods into a liquid with a texture similar to pudding. You can puree food in a  or . Pureed foods are important if you have trouble chewing or swallowing. Turning solid foods into something you can drink makes them safer and easier for you to swallow. Your doctor may have you talk with a nutrition expert, such as a dietitian, to help you plan meals. This person can help you make sure to get all the nutrients you need, including protein, vitamins, and minerals. If you are trying to keep your weight the same, you may need to eat more often so you get enough calories. You may get different advice if you are trying to lose weight. How do you puree foods? Any food you can blend into a smooth, pudding-like texture with no lumps will work for this way of eating. Chop up larger pieces of food into smaller pieces, and place them in a  or . You may need to add liquid such as juice or broth to get the right thickness. Adding food or liquid slowly into the  or  will help you get to the right texture. You can buy thickening agents that are made for this type of eating. Follow the instructions on the container. Your doctor or pharmacist can tell you where to buy these products. You can also use dried potato flakes, gelatin, or flour to thicken your drinks. Milks and juices need something added to them to get to the pudding-like consistency. If the puree is too thin, add more food. If it is too thick, add more liquid. You can use broth, juice, milk, or water to thin your food. Your doctor will help you understand what the right consistency is for your needs. Foods that can be pureed include: · Cooked meats, fish and chicken. · Dairy products such as cottage cheese, yogurt, and ice cream. 
· Cooked vegetables such as potatoes and beans. · Canned fruits. · Ripe bananas and avocados. What type of foods are not recommended? Not all foods will puree well. In general, stay away from foods that are hard or have seeds. Foods you should avoid include: 
· Nuts. · Seeds. · Raw vegetables. · Bread. · Dry cereals. Where can you learn more? Go to http://elizabeth-demetris.info/. Enter E088 in the search box to learn more about \"Learning About Pureeing Foods. \" Current as of: May 12, 2017 Content Version: 11.4 © 6664-8443 [employer]. Care instructions adapted under license by [facility] (which disclaims liability or warranty for this information). If you have questions about a medical condition or this instruction, always ask your healthcare professional. [place] [address] any warranty or liability for your use of this information. Introducing [facility] & HEALTH SERVICES! Dear Charan Plaza: Thank you for requesting a [employer] account. Our records indicate that you already have an active [employer] account. You can access your account anytime at [email or web address] Did you know that you can access your hospital and ER discharge instructions at any time in [employer]? You can also review all of your test results from your hospital stay or ER visit. Additional Information If you have questions, please visit the Frequently Asked Questions section of the [employer] website at [email or web address] Remember, [employer] is NOT to be used for urgent needs. For medical emergencies, dial 911. Now available from your iPhone and Android! Please provide this summary of care documentation to your next provider. Your primary care clinician is listed as Frank Richardson. If you have any questions after today's visit, please call [phone].

## 2018-03-06 NOTE — CONSULTS
Neurology Consult  3/6/2018     HPI: This is a 75y.o. 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 Treated by Dr. Raymond Huerta with chemotherapy and XRT. Dx complicated by seizure disorder. She presents now with nausea and vomiting and was found to have a left frontal hemorrhage. MRI discloses multiple ring-enhancing areas around the hemorrhage. PMH:     Past Medical History:   Diagnosis Date    Ankle fracture, right     Diabetes ([facility] [address]     Hypertension     Lymphoma in remission ([facility] [address]     2 spots in brain    Psychiatric disorder     Depression       SH: There is no history of substance abuse. FH: is negative for inherited neurologic disease    PE: on physical examination, the general examination revealed no significant skin lesions. There were no palpable nodes. The thyroid was not not enlarged. There are no carotid or vertebral bruits. The chest is clear to auscultation and percussion. Examination of the heart revealed S1 S2 without murmur or gallop. The abdomen soft nontender with normally active bowel sounds. There is no hepatosplenomegaly or mass. The extremities were unremarkable. There was no clubbing, cyanosis or edema. Neurologically, the patient was alert and oriented  To date, [employer]. Visual fields were intact to confrontation. Pupils are reactive from 3 mm to 2 mm bilaterally. Funduscopic examination revealed flat disks and normal vasculature bilaterally. Eye movements were full and conjugate, saccades were accurate, pursuit movements were smooth, and there was  Nystagmus on gaze to the right. Facial strength and sensation were intact. The palate and tongue moved in the midline. Sternomastoid and trapezius muscles were strong. Motor examination revealed normal tone and bulk throughout. Tendon reflexes were 2+ and symmetric. Plantar responses were flexor. Strength tested as 5/5 in all muscle groups. There was no pronator drift. Finger taps were ravi and symmetric. Finger-to-nose testing was normal on the right but ataxic on the left. The gait was  Not tested.     Findings:   Left arm ataxia  Mild cognitive slowing  normal neurologic examination     Impression: Likely recurrent lymphoma  Unclear need for anticonvulsant medication    Plan:     Await input from oncology  Check EEG

## 2018-03-06 NOTE — ED PROVIDER NOTES
EMERGENCY DEPARTMENT HISTORY AND PHYSICAL EXAM    9:39 AM      Date: 3/6/2018  Patient Name: Casie Oconnor    History of Presenting Illness     Chief Complaint   Patient presents with    Vomiting    Aphagia         History Provided By: Patient's Daughter; hx limited by pt's mental status (dementia)    Chief Complaint: vomit  Duration: 2 Days  Timing:  Intermittent  Location: N/A  Quality: after meals and on an empty stomach  Severity: Moderate  Modifying Factors: intermittent falls over the last few months and hx of lymphoma  Associated Symptoms: nausea      Additional History (Context): Casie Oconnor is a 75 y.o. female with diabetes, hypertension and stroke occurring 2wks ago who presents with vomiting x2 days. S/p CVA, daughter notes pt started hicupping after meals which has now evolved to vomiting after meals. However, pt also vomited without having eaten anything in her PCP's office where they went for vomiting concern. Glucose ok per daughter who is now concerned because vomit has prevented the pt from taking her daily meds. Pt not om blood thinners. PCP: Melvin Last MD    Current Facility-Administered Medications   Medication Dose Route Frequency Provider Last Rate Last Dose    dextrose 5% and 0.9% NaCl infusion  0.75 mL/kg/hr IntraVENous CONTINUOUS Francy Loaiza MD         Current Outpatient Prescriptions   Medication Sig Dispense Refill    VITAMIN D2 50,000 unit capsule TAKE 1 CAPSULE  EVERY  7  DAYS 12 Cap 3    insulin aspart U-100 (NOVOLOG) 100 unit/mL inpn 12 Units by SubCUTAneous route three (3) times daily.  insulin aspart U-100 (NOVOLOG) 100 unit/mL inpn 15 Units by SubCUTAneous route as needed (sliding scale insulin). -200      insulin aspart U-100 (NOVOLOG) 100 unit/mL inpn 18 Units by SubCUTAneous route as needed (sliding scale insulin).  -250      insulin aspart U-100 (NOVOLOG) 100 unit/mL inpn 21 Units by SubCUTAneous route as needed (insulin sliding scale). -300      insulin aspart U-100 (NOVOLOG) 100 unit/mL inpn 24 Units by SubCUTAneous route as needed (sliding scale insulin). -350      donepezil (ARICEPT) 10 mg tablet Take 1 Tab by mouth nightly. 90 Tab 1    DILT- mg XR capsule TAKE 1 CAPSULE EVERY DAY 90 Cap 0    levETIRAcetam (KEPPRA XR) 750 mg ER tablet Take 1 Tab by Mouth Every Night at Bedtime. 90 Tab 1    aspirin delayed-release 81 mg tablet Take 1 Tab by mouth daily. 90 Tab 1    enalapril (VASOTEC) 20 mg tablet Take 1 Tab by mouth daily. 90 Tab 3    NOVOLOG FLEXPEN 100 unit/mL inpn INJECT  30 UNITS SUBCUTANEOUSLY IN THE MORNING  AND  20 UNITS AT NIGHT FOR DIABETES 45 mL 1    citalopram (CELEXA) 20 mg tablet Take 1 Tab by mouth daily. Indications: major depressive disorder 30 Tab 11    canagliflozin (INVOKANA) 100 mg tablet Take 1 Tab by mouth Daily (before breakfast). 30 Tab 11    DUREZOL 0.05 % ophthalmic emulsion       ILEVRO 0.3 % drps       IBUPROFEN/DIPHENHYDRAMINE CIT (ADVIL PM PO) Take  by mouth.  ondansetron (ZOFRAN ODT) 4 mg disintegrating tablet Take 1 Tab by mouth every eight (8) hours as needed for Nausea. 20 Tab 0       Past History     Past Medical History:  Past Medical History:   Diagnosis Date    Ankle fracture, right     Diabetes ([address]     Hypertension     Lymphoma in remission ([facility] [address]     2 spots in brain    Psychiatric disorder     Depression       Past Surgical History:  Past Surgical History:   Procedure Laterality Date    HX HYSTERECTOMY         Family History:  History reviewed. No pertinent family history. Social History:  Social History   Substance Use Topics    Smoking status: Former Smoker    Smokeless tobacco: Never Used    Alcohol use No       Allergies:   Allergies   Allergen Reactions    Latex, Natural Rubber Other (comments)     Swelling in hands and lips    Iodine Anaphylaxis and Swelling         Review of Systems       Review of Systems   Constitutional: Negative for activity change, fatigue and fever. HENT: Positive for trouble swallowing. Negative for congestion and rhinorrhea. Eyes: Negative for visual disturbance. Respiratory: Negative for shortness of breath. Cardiovascular: Negative for chest pain and palpitations. Gastrointestinal: Positive for nausea and vomiting. Negative for abdominal pain and diarrhea. Genitourinary: Negative for dysuria and hematuria. Musculoskeletal: Negative for back pain. Skin: Negative for rash. Neurological: Negative for dizziness, weakness and light-headedness. Psychiatric/Behavioral: Negative for agitation. All other systems reviewed and are negative. Physical Exam     Visit Vitals    /65    Pulse 69    Temp 98 °F (36.7 °C)    Resp 14    Ht 5' 2\" (1.575 m)    Wt 89.4 kg (197 lb)    SpO2 98%    BMI 36.03 kg/m2         Physical Exam   Constitutional: She appears well-developed and well-nourished. No distress. HENT:   Head: Normocephalic and atraumatic. Right Ear: External ear normal.   Left Ear: External ear normal.   Nose: Nose normal.   Mouth/Throat: Oropharynx is clear and moist.   Eyes: Conjunctivae and EOM are normal. Pupils are equal, round, and reactive to light. No scleral icterus. Neck: Normal range of motion. Neck supple. No JVD present. No tracheal deviation present. No thyromegaly present. Cardiovascular: Normal rate and regular rhythm. Exam reveals no friction rub. No murmur heard. Pulmonary/Chest: Effort normal and breath sounds normal. No stridor. She exhibits no tenderness. Abdominal: Soft. Bowel sounds are normal. She exhibits no distension and no pulsatile midline mass. There is no tenderness. There is no rebound and no guarding. No hernia. Musculoskeletal: Normal range of motion. She exhibits no edema or tenderness. Lymphadenopathy:     She has no cervical adenopathy. Neurological: She is alert. No cranial nerve deficit.  Coordination normal.   Awake and oriented at baseline per daughter at bedside   Skin: Skin is warm and dry. Psychiatric: She has a normal mood and affect. Her behavior is normal. Judgment and thought content normal.   Nursing note and vitals reviewed. Diagnostic Study Results     Labs -  Recent Results (from the past 12 hour(s))   EKG, 12 LEAD, INITIAL    Collection Time: 03/06/18  9:58 AM   Result Value Ref Range    Ventricular Rate 71 BPM    Atrial Rate 71 BPM    P-R Interval 138 ms    QRS Duration 86 ms    Q-T Interval 422 ms    QTC Calculation (Bezet) 458 ms    Calculated P Axis 33 degrees    Calculated R Axis -22 degrees    Calculated T Axis 7 degrees    Diagnosis       Sinus rhythm with premature supraventricular complexes  Inferior infarct , age undetermined  Abnormal ECG  When compared with ECG of 19-AUG-2017 08:28,  premature supraventricular complexes are now present  Inferior infarct is now present  ST now depressed in Inferior leads     CBC W/O DIFF    Collection Time: 03/06/18 10:30 AM   Result Value Ref Range    WBC 10.8 4.6 - 13.2 K/uL    RBC 4.96 4.20 - 5.30 M/uL    HGB 14.5 12.0 - 16.0 g/dL    HCT 44.3 35.0 - 45.0 %    MCV 89.3 74.0 - 97.0 FL    MCH 29.2 24.0 - 34.0 PG    MCHC 32.7 31.0 - 37.0 g/dL    RDW 13.4 11.6 - 14.5 %    PLATELET 017 390 - 328 K/uL    MPV 10.6 9.2 - 23.8 FL   METABOLIC PANEL, COMPREHENSIVE    Collection Time: 03/06/18 10:30 AM   Result Value Ref Range    Sodium 141 136 - 145 mmol/L    Potassium 4.4 3.5 - 5.5 mmol/L    Chloride 105 100 - 108 mmol/L    CO2 25 21 - 32 mmol/L    Anion gap 11 3.0 - 18 mmol/L    Glucose 148 (H) 74 - 99 mg/dL    BUN 23 (H) 7.0 - 18 MG/DL    Creatinine 0.89 0.6 - 1.3 MG/DL    BUN/Creatinine ratio 26 (H) 12 - 20      GFR est AA >60 >60 ml/min/1.73m2    GFR est non-AA >60 >60 ml/min/1.73m2    Calcium 9.2 8.5 - 10.1 MG/DL    Bilirubin, total 0.5 0.2 - 1.0 MG/DL    ALT (SGPT) 19 13 - 56 U/L    AST (SGOT) 11 (L) 15 - 37 U/L    Alk.  phosphatase 78 45 - 117 U/L    Protein, total 8.1 6.4 - 8.2 g/dL    Albumin 3.9 3.4 - 5.0 g/dL    Globulin 4.2 (H) 2.0 - 4.0 g/dL    A-G Ratio 0.9 0.8 - 1.7     CARDIAC PANEL,(CK, CKMB & TROPONIN)    Collection Time: 03/06/18 10:30 AM   Result Value Ref Range    CK 40 26 - 192 U/L    CK - MB <1.0 <3.6 ng/ml    CK-MB Index  0.0 - 4.0 %     CALCULATION NOT PERFORMED WHEN RESULT IS BELOW LINEAR LIMIT    Troponin-I, Qt. <0.02 0.0 - 0.045 NG/ML   NT-PRO BNP    Collection Time: 03/06/18 10:30 AM   Result Value Ref Range    NT pro-BNP 37 0 - 900 PG/ML   PTT    Collection Time: 03/06/18 10:30 AM   Result Value Ref Range    aPTT 28.0 23.0 - 36.4 SEC   PROTHROMBIN TIME + INR    Collection Time: 03/06/18 10:30 AM   Result Value Ref Range    Prothrombin time 13.1 11.5 - 15.2 sec    INR 1.0 0.8 - 1.2     URINALYSIS W/ RFLX MICROSCOPIC    Collection Time: 03/06/18 11:00 AM   Result Value Ref Range    Color YELLOW      Appearance CLOUDY      Specific gravity 1.041 (H) 1.003 - 1.030      pH (UA) 5.0 5.0 - 8.0      Protein NEGATIVE  NEG mg/dL    Glucose >1000 (A) NEG mg/dL    Ketone 40 (A) NEG mg/dL    Bilirubin NEGATIVE  NEG      Blood MODERATE (A) NEG      Urobilinogen 0.2 0.2 - 1.0 EU/dL    Nitrites NEGATIVE  NEG      Leukocyte Esterase NEGATIVE  NEG     URINE MICROSCOPIC ONLY    Collection Time: 03/06/18 11:00 AM   Result Value Ref Range    WBC 0 to 3 0 - 4 /hpf    RBC 0 to 3 0 - 5 /hpf    Epithelial cells 2+ 0 - 5 /lpf    Bacteria 3+ (A) NEG /hpf       Radiologic Studies -   XR CHEST SNGL V   Final Result   As read by RAD:    IMPRESSION: No acute abnormality       CT HEAD WO CONT   Final Result   As read by RAD:    IMPRESSION:     1. There is a small hyperdense hemorrhage within the lateral left frontal lobe,  appears centered at the subcortical/cortical junction. No significant associated  vasogenic edema at this time. Minimal local mass effect with adjacent sulcal  effacement. Measures approximately 16 x 16 x 14 mm, estimated volume less just  under 2 cc.  Critical finding discussed with Dr. Christian Standing in the ER during initial  examination review 11:40 AM.     2.  Background of other stable chronic findings as described             Medical Decision Making   I am the first provider for this patient. I reviewed the vital signs, available nursing notes, past medical history, past surgical history, family history and social history. Vital Signs-Reviewed the patient's vital signs. Pulse Oximetry Analysis -  98% on room air (Interpretation) nonhypoxic    Cardiac Monitor:  Rate: 66    EKG: Interpreted by the EP. Time Interpreted: 0958   Rate: 71   Rhythm: Normal Sinus Rhythm    Interpretation: L axis, nml intervals, Q in inferior wall   Comparison:     Records Reviewed: Nursing Notes (Time of Review: 9:39 AM)    ED Course: Progress Notes, Reevaluation, and Consults:    10:00 Nurse made me aware that pt does have movement causing dizziness and vomiting. I will get teleneurology consult and a brain CT as well to r/o subdural, additional CVA, or other cause of vertiginous sx    10:05 Consult:  Discussed care with Dr. Gayathri King (Teleneurology). Standard discussion; including history of patients chief complaint, available diagnostic results, and treatment course. Will evaluate pt.     11:40 Dicussed with daughter and pt who agree with plan for admission and understand finding on CT scan. 11:50 Consult:  Discussed care with Junaid Palomino (Hospitalist). Standard discussion; including history of patients chief complaint, available diagnostic results, and treatment course. Accepts for admission. Provider Notes (Medical Decision Making):     Pt with vomit, no head injury recently, no sign of head trauma, mental status baseline, N/V, no CP. R/o ACS as pt is diabetic, check UA for UTI, electrolytes and hydrate pt. Main concern is vomiting: hx of difficulty swallowing but pt is vomiting without any food as well.       Critical Care Time:     CRITICAL CARE:  11:55  I have spent 30 minutes of critical care time involved in lab review, consultations with specialist, family decision-making, and documentation. During this entire length of time I was immediately available to the patient. Critical Care: The reason for providing this level of medical care for this critically ill patient was due a critical illness that impaired one or more vital organ systems such that there was a high probability of imminent or life threatening deterioration in the patients condition. This care involved high complexity decision making to assess, manipulate, and support vital system functions, to treat this degreee vital organ system failure and to prevent further life threatening deterioration of the patients condition. For Hospitalized Patients:    1. Hospitalization Decision Time:  The decision to hospitalize the patient was made by Dr. Sonal Loaiza at [phone] on 3/6/2018    2. Aspirin: Aspirin was not given because the patient has an acute intracranial hemorrhage. Diagnosis     Clinical Impression:   1. Non-intractable vomiting with nausea, unspecified vomiting type    2. Intraparenchymal hematoma of brain, left, without loss of consciousness, initial encounter ([facility] [address]        Disposition: Admit      Patient's Medications   Start Taking    No medications on file   Continue Taking    ASPIRIN DELAYED-RELEASE 81 MG TABLET    Take 1 Tab by mouth daily. CANAGLIFLOZIN (INVOKANA) 100 MG TABLET    Take 1 Tab by mouth Daily (before breakfast). CITALOPRAM (CELEXA) 20 MG TABLET    Take 1 Tab by mouth daily. Indications: major depressive disorder    DILT- MG XR CAPSULE    TAKE 1 CAPSULE EVERY DAY    DONEPEZIL (ARICEPT) 10 MG TABLET    Take 1 Tab by mouth nightly. DUREZOL 0.05 % OPHTHALMIC EMULSION        ENALAPRIL (VASOTEC) 20 MG TABLET    Take 1 Tab by mouth daily. IBUPROFEN/DIPHENHYDRAMINE CIT (ADVIL PM PO)    Take  by mouth.     ILEVRO 0.3 % DRPS        INSULIN ASPART U-100 (NOVOLOG) 100 UNIT/ML INPN    12 Units by SubCUTAneous route three (3) times daily. INSULIN ASPART U-100 (NOVOLOG) 100 UNIT/ML INPN    15 Units by SubCUTAneous route as needed (sliding scale insulin). -200    INSULIN ASPART U-100 (NOVOLOG) 100 UNIT/ML INPN    18 Units by SubCUTAneous route as needed (sliding scale insulin). -250    INSULIN ASPART U-100 (NOVOLOG) 100 UNIT/ML INPN    21 Units by SubCUTAneous route as needed (insulin sliding scale). -300    INSULIN ASPART U-100 (NOVOLOG) 100 UNIT/ML INPN    24 Units by SubCUTAneous route as needed (sliding scale insulin). -350    LEVETIRACETAM (KEPPRA XR) 750 MG ER TABLET    Take 1 Tab by Mouth Every Night at Bedtime. NOVOLOG FLEXPEN 100 UNIT/ML INPN    INJECT  30 UNITS SUBCUTANEOUSLY IN THE MORNING  AND  20 UNITS AT NIGHT FOR DIABETES    ONDANSETRON (ZOFRAN ODT) 4 MG DISINTEGRATING TABLET    Take 1 Tab by mouth every eight (8) hours as needed for Nausea. VITAMIN D2 50,000 UNIT CAPSULE    TAKE 1 CAPSULE  EVERY  7  DAYS   These Medications have changed    No medications on file   Stop Taking    No medications on file     _______________________________    Attestations:  [address] [place] [address] acting as a scribe for and in the presence of Paula Marcelo MD      March 06, 2018 at 2:21 PM       Provider Attestation:      I personally performed the services described in the documentation, reviewed the documentation, as recorded by the scribe in my presence, and it accurately and completely records my words and actions.  March 06, 2018 at 2:21 PM - Paula Marcelo MD    _______________________________

## 2018-03-06 NOTE — PROGRESS NOTES
1500: TRANSFER - IN REPORT:    Verbal report received from William Ndiaye (name) on Demetria Manriquez  being received from ED (unit) for routine progression of care      Report consisted of patients Situation, Background, Assessment and   Recommendations(SBAR). Information from the following report(s) SBAR was reviewed with the receiving nurse. Opportunity for questions and clarification was provided. Assessment completed upon patients arrival to unit and care assumed. 1530: Spoke with Dr. Ryan Habermann about BP parameters. SBP<160. He states that he wants to let her BP autoregulate overnight, unless SBP>200.     1600: Daughter left, was given privacy code, and updated on patient's current status per Dr. Ryan Habermann.     1900: Bedside shift change report given to Lisa Bernstein RN (oncoming nurse) by Valentina Galindo RN (offgoing nurse). Report included the following information SBAR, Kardex, MAR and Recent Results.

## 2018-03-06 NOTE — PROGRESS NOTES
Ligia Latham is a 75 y.o.  female and presents with    Chief Complaint   Patient presents with    Vomiting    Fall           Subjective:  Ms. Mari Escobedo presents with her daughter and son-in-law and they report vomiting after eating; ms braden denies nausea; she makes gurgling sound in the back of the throat. she was evaluated by adult protective services for [facility] evaluation. this was initiated 3 months ago. she was diagnosed with dementia 6 months ago and the family is now working to get her in assisted living. she vomited mashed potatoes last night and her blood glucose was 100. this morning her blood glucose was 112.  she has not had further fall after last week. Diabetes Mellitus:  She has diabetes mellitus, and  hypertension, hyperlipidemia and obesity.   Diabetic ROS - medication compliance: compliant all of the time with assistance from daughter and son-in-law, diabetic diet compliance: compliant all of the time with supervision, home glucose monitoring: values are usually normal.   Lab review: labs reviewed, I note that glycosylated hemoglobin normal.     Patient Active Problem List   Diagnosis Code    DM (diabetes mellitus) ([facility] E11.9    Essential hypertension, benign I10    Class 2 obesity due to excess calories with serious comorbidity and body mass index (BMI) of 38.0 to 38.9 in adult E66.09, Z68.38    Lymphoma in remission ([address] C85.90    Advance care planning Z71.89     Patient Active Problem List    Diagnosis Date Noted    Advance care planning 07/06/2016    DM (diabetes mellitus) ([address] 01/23/2014    Essential hypertension, benign 01/23/2014    Class 2 obesity due to excess calories with serious comorbidity and body mass index (BMI) of 38.0 to 38.9 in adult 01/23/2014    Lymphoma in remission ([facility] [address] 01/23/2014     Current Outpatient Prescriptions   Medication Sig Dispense Refill    VITAMIN D2 50,000 unit capsule TAKE 1 CAPSULE  EVERY  7  DAYS 12 Cap 3    insulin aspart U-100 (NOVOLOG) 100 unit/mL inpn 12 Units by SubCUTAneous route three (3) times daily.  insulin aspart U-100 (NOVOLOG) 100 unit/mL inpn 15 Units by SubCUTAneous route as needed (sliding scale insulin). -200      insulin aspart U-100 (NOVOLOG) 100 unit/mL inpn 18 Units by SubCUTAneous route as needed (sliding scale insulin). -250      insulin aspart U-100 (NOVOLOG) 100 unit/mL inpn 21 Units by SubCUTAneous route as needed (insulin sliding scale). -300      insulin aspart U-100 (NOVOLOG) 100 unit/mL inpn 24 Units by SubCUTAneous route as needed (sliding scale insulin). -350      donepezil (ARICEPT) 10 mg tablet Take 1 Tab by mouth nightly. 90 Tab 1    DILT- mg XR capsule TAKE 1 CAPSULE EVERY DAY 90 Cap 0    levETIRAcetam (KEPPRA XR) 750 mg ER tablet Take 1 Tab by Mouth Every Night at Bedtime. 90 Tab 1    aspirin delayed-release 81 mg tablet Take 1 Tab by mouth daily. 90 Tab 1    enalapril (VASOTEC) 20 mg tablet Take 1 Tab by mouth daily. 90 Tab 3    NOVOLOG FLEXPEN 100 unit/mL inpn INJECT  30 UNITS SUBCUTANEOUSLY IN THE MORNING  AND  20 UNITS AT NIGHT FOR DIABETES 45 mL 1    citalopram (CELEXA) 20 mg tablet Take 1 Tab by mouth daily. Indications: major depressive disorder 30 Tab 11    canagliflozin (INVOKANA) 100 mg tablet Take 1 Tab by mouth Daily (before breakfast). 30 Tab 11    DUREZOL 0.05 % ophthalmic emulsion       ILEVRO 0.3 % drps       IBUPROFEN/DIPHENHYDRAMINE CIT (ADVIL PM PO) Take  by mouth.  ondansetron (ZOFRAN ODT) 4 mg disintegrating tablet Take 1 Tab by mouth every eight (8) hours as needed for Nausea.  20 Tab 0     Allergies   Allergen Reactions    Latex, Natural Rubber Other (comments)     Swelling in hands and lips    Iodine Anaphylaxis and Swelling     Past Medical History:   Diagnosis Date    Ankle fracture, right     Diabetes ([address]     Hypertension     Lymphoma in remission ([address]     2 spots in brain    Psychiatric disorder Depression     Past Surgical History:   Procedure Laterality Date    HX HYSTERECTOMY       No family history on file. Social History   Substance Use Topics    Smoking status: Former Smoker    Smokeless tobacco: Never Used    Alcohol use No       ROS   Questions asked however patient is unsure with the answers General ROS: negative for - chills or fever  Psychological ROS: negative for - anxiety or depression; sleeps more than usual  Ophthalmic ROS: negative for - blurry vision  ENT ROS: negative for - headaches  Endocrine ROS: negative for - polydipsia/polyuria or temperature intolerance  Respiratory ROS: no cough, shortness of breath, or wheezing  Cardiovascular ROS: no chest pain or dyspnea on exertion  Gastrointestinal ROS: positive for - vomiting; no constipation; had normal bowel movement this morning. Genito-Urinary ROS: no dysuria, trouble voiding, or hematuria  Musculoskeletal ROS: negative for - joint stiffness  Neurological ROS: negative for - numbness/tingling; uses walker for ambulation  Dermatological ROS: negative for - rash or skin lesion changes    All other systems reviewed and are negative. Objective:  Vitals:    03/06/18 0800   BP: 166/76   Pulse: 78   Resp: 17   Temp: 96.3 °F (35.7 °C)   TempSrc: Oral   SpO2: 97%   Weight: 194 lb (88 kg)   Height: 5' (1.524 m)   PainSc:   0 - No pain     Confused; oriented to person and place; obese  obese  Mental status - disoriented to time  Mouth - mucous membranes moist, pharynx normal without lesions; no asymmetry  Chest - clear to auscultation, no wheezes, rales or rhonchi, symmetric air entry  Heart - normal rate, regular rhythm, normal S1, S2, no murmurs, rubs, clicks or gallops  Neurological - sitting in wheel chair; unsteady when standing  Skin - ecchymosis right upper leg    Assessment/Plan:    1.  Dysphagia, unspecified type  Daughter encouraged to take pt to ER if symptom persists; swallowing study ordered; she has had speech therapy at home  - XR BA SWALLOW ESOPHOGRAM; Future    2. Fall, subsequent encounter  Using wheelchair; MRI of brain scheduled for tomorrow  Family is working on placement in assisted living/SNF    3. Type 2 diabetes mellitus with hyperglycemia, without long-term current use of insulin (HCC)  At goal hgb a1c and home glucose has been normal; if pt unable to eat or take meds recommend taking her to ER. 4. Dementia  Needs full time assistance with ADLs    Lab review: no lab studies available for review at time of visit      I have discussed the diagnosis with the patient and the intended plan as seen in the above orders. The patient has received an after-visit summary and questions were answered concerning future plans. I have discussed medication side effects and warnings with the patient as well. I have reviewed the plan of care with the patient, accepted their input and they are in agreement with the treatment goals. Follow-up Disposition:  Return if symptoms worsen or fail to improve. More than 1/2 of this 25 minute visit was spent in counselling and coordination of care, as described above.

## 2018-03-06 NOTE — H&P
History and Physical    Patient: Sasha Perrin               Sex: female          DOA: 3/6/2018       YOB: 1950      Age:  75 y.o.        LOS:  LOS: 0 days        HPI:     Sasha Perrin is a 75 y.o. female who  was admitted to the hospital because of a h/o frequent falling at home . She also has had a recent h/o multiple episodes of vomiting . 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 Susanna Loose The pt has a h/o cns lymphoma and is s/p brainbiopsy . She had a biopsy of one of the frontal lobes and this established the diagnosis according to the family . The pt before she had her diagnosis of lymphoma had frequent episodes of falling and also had episodes of vomiting both of which have occurred prior to the present admission . The pt has had a ct scan of the head and this showed a small hyperdense  Hemorrhage within the lateral left frontal lobe and appears   centered  At the subcortical  Junction  . No significant edema  At this time . The volume of the hemorrhage was 16x 16x 14 mm. The pt has a h/h diabetes mellitus  Type 2 . She has a family h/o hemochromatosis . The pt has 3 cousins with hemochromatosis and has one grandchild with the disease . The pt is now admitted and brayden go to the neuro icu and will be seen by neurology . She will have an mri of the head as well as an mra. Pt's h/h is  14.5/44.3 . .her  Creatinine is 0.89 her hemoglobin a1c is 6.1 . If the mri scan shows the presence of a mass  Hem onc will be called . Past Medical History:   Diagnosis Date    Ankle fracture, right     Diabetes ([address]     Hypertension     Lymphoma in remission ([facility]     2 spots in brain    Psychiatric disorder     Depression       Social History:   Tobacco use:none    Alcohol use:none   Occupation:none    Family History:has 2 children . On granddaughter has hemochromatosis .        Review of Systems    Constitutional:  No fever or weight loss  HEENT:  No headache or visual changes  Cardiovascular:  No chest pain or diaphoresis  Respiratory:  No coughing, wheezing, or shortness of breath. GI:  Pt has had multiple episodes of vomiting   :  No hematuria or dysuria  Skin:  No rashes or moles  Neuro: pt has had multiple episodes of falling   Hematological:  No bruising or bleeding  Endocrine: has   diabetes . Physical Exam:      Visit Vitals    /65    Pulse 69    Temp 98 °F (36.7 °C)    Resp 14    Ht 5' 2\" (1.575 m)    Wt 89.4 kg (197 lb)    SpO2 98%    BMI 36.03 kg/m2       Physical Exam:    Gen: pt has a flat affect   HEENT:  Normal cephalic atraumatic, extra-occular movements are intact. Neck:  Supple, No JVD  Lungs:  Clear bilaterally, no wheeze, no rales, normal effort  Heart:  Regular Rate and Rhythm, normal S1 and S2,  Abdomen:  Soft, non tender, normal bowel sounds,   Extremities:  Well perfused, no cyanosis or edema  Neurological:  Pt has depression . she has mild aphasia . The cn are intact . motor exam shows mild weakness on the right    Skin:  No rashes or moles  Mental Status: is awake and does respond but has a flat affect    Laboratory Studies:    BMP:   Lab Results   Component Value Date/Time     03/06/2018 10:30 AM    K 4.4 03/06/2018 10:30 AM     03/06/2018 10:30 AM    CO2 25 03/06/2018 10:30 AM    AGAP 11 03/06/2018 10:30 AM     (H) 03/06/2018 10:30 AM    BUN 23 (H) 03/06/2018 10:30 AM    CREA 0.89 03/06/2018 10:30 AM    GFRAA >60 03/06/2018 10:30 AM    GFRNA >60 03/06/2018 10:30 AM     CMP:   Lab Results   Component Value Date/Time     03/06/2018 10:30 AM    K 4.4 03/06/2018 10:30 AM     03/06/2018 10:30 AM    CO2 25 03/06/2018 10:30 AM    AGAP 11 03/06/2018 10:30 AM     (H) 03/06/2018 10:30 AM    BUN 23 (H) 03/06/2018 10:30 AM    CREA 0.89 03/06/2018 10:30 AM    GFRAA >60 03/06/2018 10:30 AM    GFRNA >60 03/06/2018 10:30 AM    CA 9.2 03/06/2018 10:30 AM    ALB 3.9 03/06/2018 10:30 AM    TP 8.1 03/06/2018 10:30 AM    GLOB 4.2 (H) 03/06/2018 10:30 AM    AGRAT 0.9 03/06/2018 10:30 AM    SGOT 11 (L) 03/06/2018 10:30 AM    ALT 19 03/06/2018 10:30 AM     CBC:   Lab Results   Component Value Date/Time    WBC 10.8 03/06/2018 10:30 AM    HGB 14.5 03/06/2018 10:30 AM    HCT 44.3 03/06/2018 10:30 AM     03/06/2018 10:30 AM       Assessment/Plan     Active Problems:  S/p small hyperdense hemorrhage  Within the lateral left frontal  Lobe   H/o cns lumphoma . R/o reoccurrence of her lymphoma . S/p radiation therapy to the brain to treat her lymphoma . Pt is also s/p chemotherapy. Mild dementia possiblet due to her radiation therapy to the brain . Diabetes mellitus . Family h/o hemochromatosis . Pt has not been tested . The pt has one grandchild with the disease . She also has 3 cousins with hemochromatosis      PLAN:will admit the patient . she will have an mri of the head . If she has reoccurrence of her lymphoma then hem onc will be called .

## 2018-03-06 NOTE — PROGRESS NOTES
Carlos Enrique Aguillon is a 75 y.o female that is present in the office for a routine appointment for AMS follow up. 1. Have you been to the ER, urgent care clinic since your last visit? Hospitalized since your last visit? No    2. Have you seen or consulted any other health care providers outside of the [address] [place] [address] since your last visit? Include any pap smears or colon screening.  No      Health Maintenance Due   Topic Date Due    Hepatitis C Screening  1950    COLONOSCOPY  10/03/1967    ZOSTER VACCINE AGE 60>  08/03/2009    MICROALBUMIN Q1  02/19/2017    LIPID PANEL Q1  02/19/2017

## 2018-03-06 NOTE — PATIENT INSTRUCTIONS
Learning About the Diet for Swallowing Problems  What are swallowing problems? Difficulty swallowing is also called dysphagia (say \"dmw-EZT-zgu-uh\"). It is most often a sign of a problem with your throat or esophagus. This is the tube that moves food and liquids from the back of your mouth to your stomach. Trouble swallowing can occur when the muscles and nerves that move food through the throat and esophagus are not working right. To help you swallow food, your doctor or speech therapist may advise a special dysphagia diet for you. Why is a special diet important? A dysphagia diet can help you handle some problems that can occur when it's hard to swallow food and liquids easily. These problems can include:  · Malnutrition. This means you aren't getting enough healthy foods to keep your body working well. · Dehydration. This means you aren't getting enough liquids to keep your body healthy. · Aspiration. This means that food, liquid, or saliva goes down your windpipe (trachea) into your lungs, instead of down your esophagus to your stomach. This can lead to aspiration pneumonia, which is an inflammation of the lungs. What is the dysphagia diet? In the dysphagia diet, you change the foods you eat and the liquids you drink to make it easier to swallow them. You may:  · Change the texture of the foods you eat. Your doctor or speech therapist may advise you to eat one of these types of foods:  ¨ Solid, soft foods that have been cut up into small pieces. Extra gravy or sauce can help make these foods easier to swallow. ¨ Semi-solid foods, like ground meats or fruits and vegetables that are mashed with a fork. These foods require some chewing. Extra gravy or sauce is often served. ¨ Foods that are the texture of pudding. You don't have to chew these foods. Examples include mashed potatoes with gravy; yogurt; pudding; and pureed meats, fruits, and vegetables. · Thicken the liquids you drink.  Your doctor or speech therapist will tell you what kind of thickener to use and how thick to make the liquids. ¨ Nectar-thick liquids leave a thin coating when they are poured from a spoon. ¨ Honey-thick liquids drip slowly when they are poured from a spoon. ¨ Pudding-thick liquids do not pour from a spoon. Your speech therapist will help you learn exercises to train your muscles to work together so you can swallow. You may also need to learn how to position your body or how to put food in your mouth to be able to swallow better. Some people have severe trouble swallowing and can't get enough food and liquids. In those cases, the doctor can insert a feeding tube so the person gets enough nutrients. Follow-up care is a key part of your treatment and safety. Be sure to make and go to all appointments, and call your doctor if you are having problems. It's also a good idea to know your test results and keep a list of the medicines you take. Where can you learn more? Go to http://elizabeth-demetris.info/. Enter E796 in the search box to learn more about \"Learning About the Diet for Swallowing Problems. \"  Current as of: March 20, 2017  Content Version: 11.4  © 5481-9524 [employer]. Care instructions adapted under license by [employer] (which disclaims liability or warranty for this information). If you have questions about a medical condition or this instruction, always ask your healthcare professional. [place] [address] any warranty or liability for your use of this information. Learning About Pureeing Foods  Why are pureed foods important? Puree (say \"mollyuh-RAY\") is a way to turn solid foods into a liquid with a texture similar to pudding. You can puree food in a  or . Pureed foods are important if you have trouble chewing or swallowing. Turning solid foods into something you can drink makes them safer and easier for you to swallow.   Your doctor may have you talk with a nutrition expert, such as a dietitian, to help you plan meals. This person can help you make sure to get all the nutrients you need, including protein, vitamins, and minerals. If you are trying to keep your weight the same, you may need to eat more often so you get enough calories. You may get different advice if you are trying to lose weight. How do you puree foods? Any food you can blend into a smooth, pudding-like texture with no lumps will work for this way of eating. Chop up larger pieces of food into smaller pieces, and place them in a  or . You may need to add liquid such as juice or broth to get the right thickness. Adding food or liquid slowly into the  or  will help you get to the right texture. You can buy thickening agents that are made for this type of eating. Follow the instructions on the container. Your doctor or pharmacist can tell you where to buy these products. You can also use dried potato flakes, gelatin, or flour to thicken your drinks. Milks and juices need something added to them to get to the pudding-like consistency. If the puree is too thin, add more food. If it is too thick, add more liquid. You can use broth, juice, milk, or water to thin your food. Your doctor will help you understand what the right consistency is for your needs. Foods that can be pureed include:  · Cooked meats, fish and chicken. · Dairy products such as cottage cheese, yogurt, and ice cream.  · Cooked vegetables such as potatoes and beans. · Canned fruits. · Ripe bananas and avocados. What type of foods are not recommended? Not all foods will puree well. In general, stay away from foods that are hard or have seeds. Foods you should avoid include:  · Nuts. · Seeds. · Raw vegetables. · Bread. · Dry cereals. Where can you learn more? Go to http://elizabeth-demetris.info/.   Enter O761 in the search box to learn more about \"Learning About Pureeing Foods. \"  Current as of: May 12, 2017  Content Version: 11.4  © 0992-8444 [employer]. Care instructions adapted under license by [employer] (which disclaims liability or warranty for this information). If you have questions about a medical condition or this instruction, always ask your healthcare professional. [address] any warranty or liability for your use of this information.

## 2018-03-06 NOTE — IP AVS SNAPSHOT
[address] [address] 
[phone] Patient: Henri Mittal MRN: [id number] GYD:[id number] About your hospitalization You were admitted on:  March 6, 2018 You last received care in the:  [address] [facility]URO [facility] You were discharged on:  March 12, 2018 Why you were hospitalized Your primary diagnosis was:  Not on File Your diagnoses also included:  Cva (Cerebral Vascular Accident) (Hcc), Hemorrhage Of Brain, Nontraumatic (Hcc), Dementia Associated With Other Underlying Disease, Debility, Other Constipation Follow-up Information Follow up With Details Comments Contact Info Hiam De Souza MD   [id number] [place] [address] [address] [id number] [address] [id number] [phone] Your Scheduled Appointments Wednesday March 14, 2018 To Be Determined PT RE-EVALUATION with Bella Tom PT  
[facility] HOME CARE SCHEDULING/INTAKE ([place]) [address] CARE SCHEDULING/INTAKE [facility] Monday March 19, 2018 To Be Determined PTA HOME VISIT with Yovani Lyons V, PTA  
 [address] CARE SCHEDULING/INTAKE ([place]) [address] CARE SCHEDULING/INTAKE [facility] Wednesday March 21, 2018 To Be Determined PTA HOME VISIT with Yovani Lyons V, PTA  
 [address] CARE SCHEDULING/INTAKE ([place]) [address] CARE SCHEDULING/INTAKE [facility]) Friday April 13, 2018  9:45 AM EDT Medicare Physical with Hima De Souza MD  
[address] [address] [address] [facility] [address] [facility] [address] [id number]  
[phone] Discharge Orders None A check kt indicates which time of day the medication should be taken. My Medications START taking these medications Instructions Each Dose to Equal  
 Morning Noon Evening Bedtime  
 amLODIPine 10 mg tablet Commonly known as:  Alfonzoadelaide Rodriguez Start taking on:  3/13/2018 Your last dose was: Your next dose is: Take 1 Tab by mouth daily. 10 mg  
    
   
   
   
  
 dexamethasone 4 mg tablet Commonly known as:  DECADRON Your last dose was: Your next dose is: Take 0.5 Tabs by mouth two (2) times daily (with meals). 2 mg  
    
   
   
   
  
 folic acid 1 mg tablet Commonly known as:  Jack Start taking on:  3/13/2018 Your last dose was: Your next dose is: Take 1 Tab by mouth daily. 1 mg Thiamine Mononitrate 100 mg tablet Commonly known as:  B-1 Start taking on:  3/13/2018 Your last dose was: Your next dose is: Take 1 Tab by mouth daily. 100 mg CHANGE how you take these medications Instructions Each Dose to Equal  
 Morning Noon Evening Bedtime NovoLOG Flexpen U-100 Insulin 100 unit/mL Inpn Generic drug:  insulin aspart U-100 What changed:  Another medication with the same name was removed. Continue taking this medication, and follow the directions you see here. Your last dose was: Your next dose is: INJECT  30 UNITS SUBCUTANEOUSLY IN THE MORNING  AND  20 UNITS AT NIGHT FOR DIABETES  
     
   
   
   
  
  
CONTINUE taking these medications Instructions Each Dose to Equal  
 Morning Noon Evening Bedtime  
 citalopram 20 mg tablet Commonly known as:  Sharda Corey Your last dose was: Your next dose is: Take 1 Tab by mouth daily. Indications: major depressive disorder 20 mg DILT- mg XR capsule Generic drug:  dilTIAZem XR Your last dose was: Your next dose is:  TAKE 1 CAPSULE EVERY DAY  
     
 donepezil 10 mg tablet Commonly known as:  ARICEPT Your last dose was: Your next dose is: Take 1 Tab by mouth nightly. 10 mg  
    
   
   
   
  
 enalapril 20 mg tablet Commonly known as:  Willia Jean Your last dose was: Your next dose is: Take 1 Tab by mouth daily. 20 mg  
    
   
   
   
  
 levETIRAcetam 750 mg ER tablet Commonly known as:  KEPPRA XR Your last dose was: Your next dose is: Take 1 Tab by Mouth Every Night at Bedtime. ondansetron 4 mg disintegrating tablet Commonly known as:  ZOFRAN ODT Your last dose was: Your next dose is: Take 1 Tab by mouth every eight (8) hours as needed for Nausea. 4 mg STOP taking these medications ADVIL PM PO  
   
  
 aspirin delayed-release 81 mg tablet  
   
  
 canagliflozin 100 mg tablet Commonly known as:  [place] Jemma DUREZOL 0.05 % ophthalmic emulsion Generic drug:  Difluprednate ILEVRO 0.3 % Drps Generic drug:  nepafenac VITAMIN D2 50,000 unit capsule Generic drug:  ergocalciferol Where to Get Your Medications Information on where to get these meds will be given to you by the nurse or doctor. ! Ask your nurse or doctor about these medications  
  amLODIPine 10 mg tablet  
 dexamethasone 4 mg tablet  
 folic acid 1 mg tablet Thiamine Mononitrate 100 mg tablet Discharge Instructions DISCHARGE SUMMARY from Nurse PATIENT INSTRUCTIONS: 
 
 
F-face looks uneven A-arms unable to move or move unevenly S-speech slurred or non-existent T-time-call 911 as soon as signs and symptoms begin-DO NOT go Back to bed or wait to see if you get better-TIME IS BRAIN. Warning Signs of HEART ATTACK Call 911 if you have these symptoms: 
? Chest discomfort. Most heart attacks involve discomfort in the center of the chest that lasts more than a few minutes, or that goes away and comes back. It can feel like uncomfortable pressure, squeezing, fullness, or pain. ? Discomfort in other areas of the upper body. Symptoms can include pain or discomfort in one or both arms, the back, neck, jaw, or stomach. ? Shortness of breath with or without chest discomfort. ? Other signs may include breaking out in a cold sweat, nausea, or lightheadedness. Don't wait more than five minutes to call [address]! Fast action can save your life. Calling 911 is almost always the fastest way to get lifesaving treatment. Emergency Medical Services staff can begin treatment when they arrive  up to an hour sooner than if someone gets to the hospital by car. The discharge information has been reviewed with the patient. The patient verbalized understanding. Discharge medications reviewed with the patient and appropriate educational materials and side effects teaching were provided. ___________________________________________________________________________________________________________________________________ Learning About How to Prevent a Stroke What is a stroke? A stroke occurs when a blood vessel in the brain bursts or is blocked by a blood clot. Without blood and the oxygen it carries, part of the brain starts to die. The part of the body controlled by the damaged area of the brain can't work properly. But there are many things you can do to help lower your stroke risk. What increases your risk for stroke? A risk factor is anything that makes you more likely to have a particular health problem. Risk factors for stroke that you can treat or change include: 
· Health problems like high blood pressure, atrial fibrillation, diabetes, and high cholesterol. · Smoking. · Heavy use of alcohol. · Being overweight. · Not getting enough physical activity. Risk factors you can't change include: · Age. The risk of stroke goes up as you get older. · Race.  Americans, Native Americans, and [place] Natives have a higher risk than those of other races. · Being female. Women have a higher risk of stroke than men. · Family history of stroke. Your doctor can help you know your risk. Then you and your can doctor talk about whether you need to lower it. What can you do to prevent a stroke? · Treat any health problems you have that raise your risk. · Adopt a heart-healthy lifestyle: ¨ Don't smoke. If you need help quitting, talk to your doctor about stop-smoking programs and medicines. These can increase your chances of quitting for good. ¨ Limit alcohol to 2 drinks a day for men and 1 drink a day for women. ¨ Stay at a healthy weight. Lose weight if you need to. ¨ If your doctor recommends it, get more exercise. Walking is a good choice. Bit by bit, increase the amount you walk every day. Try for at least 30 minutes on most days of the week. ¨ Eat heart-healthy foods. These include fruits, vegetables, high-fiber foods, and fish. Choose foods that are low in sodium, saturated fat, and trans fat. · Decide with your doctor whether you will also take medicines to help lower your risk. For example, you and your doctor may decide you will take a medicine that prevents blood clots. What are the symptoms of a stroke? The brain damage from a stroke starts within minutes. That's why it's so important to know the symptoms of stroke and to act fast. Quick treatment can help limit damage to the brain so that you have fewer problems after the stroke. FAST is a simple way to remember the main symptoms of stroke. Recognizing these symptoms helps you know when to call for medical help. FAST stands for: 
· Face drooping. · Arm weakness. · Speech difficulty. · Time to call 911. Follow-up care is a key part of your treatment and safety. Be sure to make and go to all appointments, and call your doctor if you are having problems. It's also a good idea to know your test results and keep a list of the medicines you take. Where can you learn more? Go to http://elizabeth-demetris.info/. Enter G757 in the search box to learn more about \"Learning About How to Prevent a Stroke. \" Current as of: March 20, 2017 Content Version: 11.4 © 4946-1697 [employer]. Care instructions adapted under license by [employer] (which disclaims liability or warranty for this information). If you have questions about a medical condition or this instruction, always ask your healthcare professional. [place] [address] any warranty or liability for your use of this information. Learning About How to Prevent Another Stroke What can you do to prevent another stroke? After a stroke, people feel lots of different emotions. Some people are worried that they could have another stroke. Or they may feel overwhelmed by how much there is to learn and do. Some people feel sad or depressed. No matter what emotions you are feeling, you can give yourself some control and peace of mind by making a plan to lower your risk of having another stroke. Take your medicines You'll need to take medicines to help prevent another stroke. Be sure to take your medicines exactly as prescribed. And don't stop taking them unless your doctor tells you to. If you stop taking your medicines, you can increase your risk of having another stroke. Some of the medicines your doctor may prescribe include: · Aspirin or some other blood thinner to prevent blood clots. · Statins to lower cholesterol. · Blood pressure medicines to lower blood pressure. Manage other health problems You can help lower your chance of having another stroke by managing certain other health problems. Problems that increase your risk of having another stroke include: · High blood pressure. · High cholesterol. · Atrial fibrillation. · Diabetes. If you have any of these health problems, you can manage them with healthy lifestyle changes along with medicine. Adopt a healthy lifestyle · Do not smoke or allow others to smoke around you. If you need help quitting, talk to your doctor about stop-smoking programs and medicines. These can increase your chances of quitting for good. Smoking makes a stroke more likely. · Limit alcohol to 2 drinks a day for men and 1 drink a day for women. · Lose weight if you need to. Controlling your weight will help you keep your heart and body healthy. · Be active. Ask your doctor what type and level of activity is safe for you. · Eat heart-healthy foods, like fruits, vegetables, and high-fiber foods. It's also important to: · Get a flu shot every year. · Ask for help if you think you are depressed. Do stroke rehab Taking part in a stroke rehabilitation (rehab) program will help you to regain skills you lost or make the most of your abilities after a stroke. It also helps you take steps to prevent another stroke. Your rehab team will give you education and support to help you build new, healthy habits. You'll learn how to manage any other health problems that you might have. Abebe Roldan also learn how to exercise safely, eat a healthy diet, and quit smoking if you smoke. Abebe Roldan work with your team to decide what lifestyle choices are best for you. If your doctor hasn't already suggested it, ask him or her if stroke rehab is right for you. Know stroke symptoms Make sure you know the symptoms of stroke. FAST is a simple way to remember. Recognizing these symptoms helps you know when to call for medical help. FAST stands for: 
· Face drooping. · Arm weakness. · Speech difficulty. · Time to call 911. Follow-up care is a key part of your treatment and safety. Be sure to make and go to all appointments, and call your doctor if you are having problems. It's also a good idea to know your test results and keep a list of the medicines you take. Where can you learn more? Go to http://elizabeth-demetris.info/. Enter I194 in the search box to learn more about \"Learning About How to Prevent Another Stroke. \" Current as of: March 20, 2017 Content Version: 11.4 © 3642-3129 [employer]. Care instructions adapted under license by [employer] (which disclaims liability or warranty for this information). If you have questions about a medical condition or this instruction, always ask your healthcare professional. [place] [address] any warranty or liability for your use of this information. Patient discharged without removing armband and transfered to another healthcare acute, sub acute , or extended care facility. Informed of privacy risks if armband lost or stolen. [email or web address] Announcement We are excited to announce that we are making your provider's discharge notes available to you in [email or web address]. You will see these notes when they are completed and signed by the physician that discharged you from your recent hospital stay. If you have any questions or concerns about any information you see in [email or web address], please call the Health Information Department where you were seen or reach out to your Primary Care Provider for more information about your plan of care. Introducing [facility] [place] [facility]! Dear Fifi Sanchez: Thank you for requesting a [email or web address] account.   Our records indicate that you already have an active [employer] account. You can access your account anytime at [email or web address] The Online [email or web address] Did you know that you can access your hospital and ER discharge instructions at any time in [employer]? You can also review all of your test results from your hospital stay or ER visit. Additional Information If you have questions, please visit the Frequently Asked Questions section of the [employer] website at [email or web address] The Online [email or web address] Remember, [employer] is NOT to be used for urgent needs. For medical emergencies, dial 911. Now available from your iPhone and Android! Providers Seen During Your Hospitalization Provider Specialty Primary office phone Milagro Ontiveros MD Emergency Medicine [phone] Cindy Bustamante MD Internal Medicine [phone] Ton Vu MD Family Practice [phone] Your Primary Care Physician (PCP) Primary Care Physician Office Phone Office Fax [address] You are allergic to the following Allergen Reactions Latex, Natural Rubber Other (comments) Swelling in hands and lips Iodine Anaphylaxis Swelling Recent Documentation Height Weight Breastfeeding? BMI OB Status Smoking Status 1.575 m 89.3 kg No 36.01 kg/m2 Hysterectomy Former Smoker Emergency Contacts Name Discharge Info Relation Home Work Mobile [address] CAREGIVER [3] Daughter [21] [phone] [facility] DISCHARGE CAREGIVER [3] Son [22] [phone] OrHao  Child [2] [phone] Patient Belongings The following personal items are in your possession at time of discharge: 
  Dental Appliances: None  Visual Aid: None      Home Medications: None   Jewelry: None  Clothing: None    Other Valuables: None Discharge Instructions Attachments/References HEMORRHAGIC STROKE: GENERAL INFO (ENGLISH) AMLODIPINE (BY MOUTH) (ENGLISH) DEXAMETHASONE (BY MOUTH) (ENGLISH) FOLIC ACID (BY MOUTH) (ENGLISH) THIAMINE (VITAMIN B-1) (BY MOUTH) (ENGLISH) Patient Handouts Learning About a Hemorrhagic Stroke What is a hemorrhagic stroke? When you have a hemorrhagic (say \"Amna\") stroke, it means that a blood vessel in the brain has burst open or has started to leak. When the blood spills into the space inside and around the brain, it damages nearby nerve cells. This is different from an ischemic (say \"ita-OQH-ycyd\") stroke, which happens when a blood clot blocks a blood vessel in the brain. The brain damage from a stroke starts within minutes. Quick treatment can help limit damage to the brain and make recovery more likely. People who have had a stroke may have a hard time talking, understanding things, and making decisions. They may have to relearn daily activities, such as how to eat, bathe, and dress. How well someone recovers from a stroke depends on how quickly the person gets to the hospital, where in the brain the stroke happened, and how severe it was. Stroke rehabilitation, which includes training and therapy, also helps people recover. What are the symptoms? If you have any of these symptoms, call 911 or other emergency services right away. · You have symptoms of a stroke. These may include: 
¨ Sudden numbness, tingling, weakness, or loss of movement in your face, arm, or leg, especially on only one side of your body. ¨ Sudden vision changes. ¨ Sudden trouble speaking. ¨ Sudden confusion or trouble understanding simple statements. ¨ Sudden problems with walking or balance. ¨ A sudden, severe headache that is different from past headaches. See your doctor if you have symptoms that seem like a stroke, even if they go away quickly. You may have had a transient ischemic attack (TIA), sometimes called a mini-stroke.  A TIA is a warning that a stroke may happen soon. Getting early treatment for a TIA can help prevent a stroke. What causes a hemorrhagic stroke? A hemorrhagic stroke happens to blood vessels that have been weakened. The most common causes of weakened blood vessels in the brain are: · A brain aneurysm. This is a bulging, weak part of a blood vessel. It can put pressure on nerves, or it can bleed or break open (rupture). · A brain AVM. This is an abnormal knot of weak blood vessels that some people are born with. · Head injury. · Chronic uncontrolled high blood pressure. Blood pressure that is too high over the long term increases your risk for stroke. · Very high blood pressure. Very high blood pressure that comes on suddenly is dangerous. It is a medical emergency. Anticoagulant and antiplatelet medicines can also cause bleeding in the brain. These medicines, also called blood thinners, increase the time it takes for a blood clot to form. How is hemorrhagic stroke treated? Emergency treatment is done to stop the bleeding and prevent damage to the brain. · You may need surgery to repair an aneurysm or to remove the blood that has built up inside the brain. · You may be given medicine to stop the bleeding. · You will be closely watched for signs of increased pressure on the brain. These signs include restlessness, confusion, trouble following commands, and headache. · You may take medicine to manage high blood pressure. Ask your doctor if a stroke rehab program is right for you. Rehab increases your chances of getting back some of the abilities you lost. 
How can you prevent another stroke? · Work with your doctor to treat health problems, such as high blood pressure, that raise your chances of having another stroke. · Be safe with medicines. Take your medicine exactly as prescribed. Call your doctor if you think you are having a problem with your medicine. · Have a healthy lifestyle. ¨ Do not smoke or allow others to smoke around you. If you need help quitting, talk to your doctor about stop-smoking programs and medicines. These can increase your chances of quitting for good. Smoking makes a stroke more likely. ¨ Limit alcohol to 2 drinks a day for men and 1 drink a day for women. ¨ Be active. Ask your doctor what type and level of activity is safe for you. ¨ Eat heart-healthy foods, like fruits, vegetables, and high-fiber foods. ¨ Stay at a healthy weight. Lose weight if you need to. Follow-up care is a key part of your treatment and safety. Be sure to make and go to all appointments, and call your doctor if you are having problems. It's also a good idea to know your test results and keep a list of the medicines you take. Where can you learn more? Go to http://elizabeth-demetris.info/. Enter R416 in the search box to learn more about \"Learning About a Hemorrhagic Stroke. \" Current as of: March 20, 2017 Content Version: 11.4 © 1888-4841 [employer]. Care instructions adapted under license by [employer] (which disclaims liability or warranty for this information). If you have questions about a medical condition or this instruction, always ask your healthcare professional. [address] any warranty or liability for your use of this information. Amlodipine (By mouth) Amlodipine (co-DAM-wh-peen) Treats high blood pressure and angina (chest pain). This medicine is a calcium channel blocker. Brand Name(s): Norvasc There may be other brand names for this medicine. When This Medicine Should Not Be Used: This medicine is not right for everyone. Do not use it if you had an allergic reaction to amlodipine. How to Use This Medicine:  
Tablet, Dissolving Tablet · Take your medicine as directed. Your dose may need to be changed several times to find what works best for you.  Take this medicine at the same time each day. · Read and follow the patient instructions that come with this medicine. Talk to your doctor or pharmacist if you have any questions. · Missed dose: Take a dose as soon as you remember. If it has been more than 12 hours since you were supposed to take your dose, skip the missed dose and take your next regular dose at the regular time. · Store the medicine in a closed container at room temperature, away from heat, moisture, and direct light. Drugs and Foods to Avoid: Ask your doctor or pharmacist before using any other medicine, including over-the-counter medicines, vitamins, and herbal products. · Some medicines can affect how amlodipine works. Tell your doctor if you are also using any of the following: ¨ Clarithromycin, cyclosporine, diltiazem, itraconazole, ritonavir, sildenafil, simvastatin, tacrolimus Warnings While Using This Medicine: · Tell your doctor if you are pregnant or breastfeeding, or if you have liver disease, heart disease, coronary artery disease, or aortic stenosis. · This medicine could lower your blood pressure too much, especially when you first use it or if you are dehydrated. Stand or sit up slowly if you feel lightheaded or dizzy. · Your doctor will check your progress and the effects of this medicine at regular visits. Keep all appointments. · Do not stop using this medicine without asking your doctor, even if you feel well. This medicine will not cure high blood pressure, but it will help keep it in normal range. You may have to take blood pressure medicine for the rest of your life. · Keep all medicine out of the reach of children. Never share your medicine with anyone. Possible Side Effects While Using This Medicine:  
Call your doctor right away if you notice any of these side effects: · Allergic reaction: Itching or hives, swelling in your face or hands, swelling or tingling in your mouth or throat, chest tightness, trouble breathing · Lightheadedness, dizziness · New or worsening chest pain · Swelling in your hands, ankles, or legs · Trouble breathing, nausea, unusual sweating, fainting If you notice other side effects that you think are caused by this medicine, tell your doctor. Call your doctor for medical advice about side effects. You may report side effects to FDA at 9-285-FDA-0237 © 2017 [address] Information is for End User's use only and may not be sold, redistributed or otherwise used for commercial purposes. The above information is an  only. It is not intended as medical advice for individual conditions or treatments. Talk to your doctor, nurse or pharmacist before following any medical regimen to see if it is safe and effective for you. Dexamethasone (By mouth) Dexamethasone (dex-a-METH-a-sone) Treats symptoms of several diseases and conditions. This medicine is a corticosteroid. Brand Name(s): Cushings Syndrome Diagnostic, DexPak, DexPak 10 Day TaperPak, DexPak 13 Day TaperPak, DexPak 6 Day TaperPak, Dexamethasone Intensol, LoCort, ZonaCort There may be other brand names for this medicine. When This Medicine Should Not Be Used: This medicine is not right for everyone. Do not use it if you had an allergic reaction to dexamethasone, or if you have a fungal infection. How to Use This Medicine:  
Liquid, Tablet · Take your medicine as directed. Your dose may need to be changed several times to find what works best for you. · It is best to take this medicine with food or milk. · Oral liquid: Measure the oral liquid medicine with a marked measuring spoon, oral syringe, or medicine cup. · Missed dose: ¨ If your schedule is one dose every other day and you cannot use the missed dose until late in the day, wait until the next morning to use your medicine. Then skip a day and go back to your regular schedule. ¨ If your schedule is one dose every day, use the missed dose as soon as you can. Then go back to your regular schedule. ¨ If your schedule is more than one dose every day, use the missed dose as soon as you can. If it is almost time for your next dose, use two doses at that time. Then go back to your regular schedule. · Store the medicine in a closed container at room temperature, away from heat, moisture, and direct light. Drugs and Foods to Avoid: Ask your doctor or pharmacist before using any other medicine, including over-the-counter medicines, vitamins, and herbal products. · Some foods and medicines can affect how dexamethasone works. Tell your doctor if you are using any of the following: ¨ Aminoglutethimide, amphotericin B, carbamazepine, cholestyramine, cyclosporine, digoxin, indinavir, indomethacin, ketoconazole, phenobarbital, phenytoin, rifampin, or thalidomide ¨ Antibiotic (including azithromycin, clarithromycin, erythromycin) ¨ Birth control pills ¨ Blood thinner (including warfarin) ¨ Diuretic (water pill) ¨ Insulin and other diabetes medicine ¨ NSAID pain or arthritis medicine (including aspirin, celecoxib, diclofenac, naproxen) ¨ Potassium supplement · Do not drink alcohol while you are using this medicine. · This medicine may interfere with vaccines. Ask your doctor before you get a flu shot or any other vaccines. Warnings While Using This Medicine: · Tell your doctor if you are pregnant or breastfeeding, or if you have kidney problems, liver disease, diabetes, glaucoma, herpes infection of the eye, allergies, myasthenia gravis, osteoporosis, stomach or bowel problems, or thyroid problems. Tell your doctor if you have congestive heart failure, high blood pressure, or a recent heart attack. Tell your doctor if you have any type of infection or a history of depression or mental problems. · This medicine may cause the following problems: ¨ High blood pressure, retaining water, changes in salt or potassium levels in your body ¨ Cataracts or glaucoma (with long-term use) ¨ Bone loss (with long-term use) ¨ Mood or behavior changes ¨ Slow growth in children (with long-term use) · Do not stop using this medicine suddenly. Your doctor will need to slowly decrease your dose before you stop it completely. · This medicine could cause you to get infections more easily. Tell your doctor right away if you are exposed to chicken pox, measles, or any other serious infection. Tell your doctor if you had a serious infection in the past, such as tuberculosis. · Tell your doctor about any extra stress or anxiety in your life. Your dose might need to be changed for a short time. · Make sure any doctor or dentist who treats you knows that you are using this medicine. · Your doctor will check your progress and the effects of this medicine at regular visits. Keep all appointments. · Keep all medicine out of the reach of children. Never share your medicine with anyone. Possible Side Effects While Using This Medicine:  
Call your doctor right away if you notice any of these side effects: · Allergic reaction: Itching or hives, swelling in your face or hands, swelling or tingling in your mouth or throat, chest tightness, trouble breathing · Changes in vision, trouble seeing, eye pain · Dark freckles, skin changes, coldness, weakness, tiredness, weight loss · Depression, unusual thoughts, feelings, or behaviors, trouble sleeping · Fast or slow, pounding heartbeat · Fever, chills, cough, sore throat, body aches · Rapid weight gain, swelling in your hands, ankles, or feet · Severe stomach pain, nausea, vomiting, or red or black stools · Trouble breathing · Trouble urinating · Worsened joint pain, swelling, or stiffness If you notice these less serious side effects, talk with your doctor: · Round, puffy face · Weight gain around your neck, upper back, breast, face, or waist 
If you notice other side effects that you think are caused by this medicine, tell your doctor. Call your doctor for medical advice about side effects. You may report side effects to FDA at 1-489-FDA-4746 © 2017 [address] Information is for End User's use only and may not be sold, redistributed or otherwise used for commercial purposes. The above information is an  only. It is not intended as medical advice for individual conditions or treatments. Talk to your doctor, nurse or pharmacist before following any medical regimen to see if it is safe and effective for you. Folic Acid (By mouth) Folic Acid (FOE-lik AS-id) Treats certain types of anemia (not enough red blood cells). Is also given as a supplement to women who are pregnant or planning on getting pregnant. Folic acid is a B vitamin. Brand Name(s): FA-8, Falessa, Folacin-800, Methylfolate, Nature's Blend Folic Acid, Optimum Folic Acid, PharmAssure Folic Acid, [employer] Folic Acid There may be other brand names for this medicine. When This Medicine Should Not Be Used: You should not use this medicine if you have ever had an allergic reaction to folic acid. How to Use This Medicine:  
Capsule, Tablet · Your doctor will tell you how much to take and how often. · Keep taking this medicine for as long as your doctor tells you to, even if you feel better. If a dose is missed: · Take the missed dose as soon as possible. · If it is almost time for your next regular dose, wait until then to take your medicine and skip the missed dose. · You should not use two doses at the same time. How to Store and Dispose of This Medicine: · Store at room temperature. Protect from heat, direct light, and moisture. · Ask your pharmacist, doctor, or health caregiver about the best way to dispose of any outdated medicine or medicine no longer needed. · Keep all medicine out of the reach of children. Drugs and Foods to Avoid: Ask your doctor or pharmacist before using any other medicine, including over-the-counter medicines, vitamins, and herbal products. · Make sure your doctor knows if you are taking Dilantin®. · If you are taking Questran® or Colestid®, take your folic acid dose 1 hour before or at least 4 hours after the other medicine. · Your doctor may ask you to eat more foods that contain folic acid. Good sources of folic acid are fruits, green leafy vegetables, liver, kidney, and breads made with dried yeast. 
Warnings While Using This Medicine: · If you are pregnant or breastfeeding, tell your doctor. It is very important during this time to take the correct vitamins in the right amounts. Possible Side Effects While Using This Medicine:  
Call your doctor right away if you notice any of these side effects: 
· Skin rash, itching, and redness If you notice these less serious side effects, talk with your doctor: · Nausea, bloating, gas · Bitter taste in the mouth · Irritability · Trouble sleeping If you notice other side effects that you think are caused by this medicine, tell your doctor. Call your doctor for medical advice about side effects. You may report side effects to FDA at 1-646-FDA-7939 © 2017 [address]  Information is for End User's use only and may not be sold, redistributed or otherwise used for commercial purposes. The above information is an  only. It is not intended as medical advice for individual conditions or treatments. Talk to your doctor, nurse or pharmacist before following any medical regimen to see if it is safe and effective for you. Thiamine (Vitamin B-1) (By mouth) Thiamine (THYE-a-min) Thiamine, another name for Vitamin B-1, is used to treat thiamine-deficiency (not enough thiamine in the body). Brand Name(s): Nature's Blend Vitamin B-1, Optimum Vitamin B-1, [employer] Vitamin B-1, [employer] B1 There may be other brand names for this medicine. When This Medicine Should Not Be Used: You should not use this medicine if you have had an allergic reaction to thiamine (Vitamin B-1). How to Use This Medicine:  
Tablet, Capsule, Liquid · Your doctor will tell you how much and when to take your medicine. Keep all medicine away from children. · You may take your medicine with or without food. · Measure your oral liquid medicine using a marked measuring spoon or medicine cup. If a dose is missed: · Take the missed dose as soon as possible. · Skip the missed dose if it is almost time for your next dose. · You should not use two doses at the same time. · Missing a dose is generally not a cause for concern. How to Store and Dispose of This Medicine: · Store the tablets at room temperature in a closed container. Keep away from heat, moisture, and direct light. · Store the oral liquid at room temperature, away from heat and light. Do not freeze. Drugs and Foods to Avoid: Ask your doctor or pharmacist before using any other medicine, including over-the-counter medicines, vitamins, and herbal products. · Follow your doctor's orders if he or she has given you a special diet. Warnings While Using This Medicine: · Tell your doctor if you are pregnant or breastfeeding, or if you become pregnant. Possible Side Effects While Using This Medicine:  
Call your doctor right away if you notice any of these side effects: 
· Itching or trouble breathing · Swelling of face, lips or eyelids If you notice other side effects that you think are caused by this medicine, tell your doctor. Call your doctor for medical advice about side effects. You may report side effects to FDA at 2-993-FDA-7832 © 2017 [facility] Information is for End User's use only and may not be sold, redistributed or otherwise used for commercial purposes. The above information is an  only. It is not intended as medical advice for individual conditions or treatments. Talk to your doctor, nurse or pharmacist before following any medical regimen to see if it is safe and effective for you. Please provide this summary of care documentation to your next provider. Signatures-by signing, you are acknowledging that this After Visit Summary has been reviewed with you and you have received a copy. Patient Signature:  ____________________________________________________________ Date:  ____________________________________________________________  
  
[facility] Signature:  ____________________________________________________________ Date:  ____________________________________________________________

## 2018-03-06 NOTE — ED TRIAGE NOTES
Vomiting since yesterday . PCP feels has had a sroke 2 weeks ago. Unable to keep meds down. No nausea or pain. Just vomiting. Difficulty swallowing. Feels diaphragm not working correct. Sent from PCP office.  Rodriguez swallowing screen and MRI ordered for tomorrow

## 2018-03-06 NOTE — IP AVS SNAPSHOT
[address] [address] 
[phone] Patient: Claudetta Blazer MRN: [id number] CIF:[id number] A check kt indicates which time of day the medication should be taken. My Medications START taking these medications Instructions Each Dose to Equal  
 Morning Noon Evening Bedtime  
 amLODIPine 10 mg tablet Commonly known as:  Bryant Lute Start taking on:  3/13/2018 Your last dose was: Your next dose is: Take 1 Tab by mouth daily. 10 mg  
    
   
   
   
  
 dexamethasone 4 mg tablet Commonly known as:  DECADRON Your last dose was: Your next dose is: Take 0.5 Tabs by mouth two (2) times daily (with meals). 2 mg  
    
   
   
   
  
 folic acid 1 mg tablet Commonly known as:  Google Start taking on:  3/13/2018 Your last dose was: Your next dose is: Take 1 Tab by mouth daily. 1 mg Thiamine Mononitrate 100 mg tablet Commonly known as:  B-1 Start taking on:  3/13/2018 Your last dose was: Your next dose is: Take 1 Tab by mouth daily. 100 mg CHANGE how you take these medications Instructions Each Dose to Equal  
 Morning Noon Evening Bedtime NovoLOG Flexpen U-100 Insulin 100 unit/mL Inpn Generic drug:  insulin aspart U-100 What changed:  Another medication with the same name was removed. Continue taking this medication, and follow the directions you see here. Your last dose was: Your next dose is: INJECT  30 UNITS SUBCUTANEOUSLY IN THE MORNING  AND  20 UNITS AT NIGHT FOR DIABETES  
     
   
   
   
  
  
CONTINUE taking these medications Instructions Each Dose to Equal  
 Morning Noon Evening Bedtime  
 citalopram 20 mg tablet Commonly known as:  Kofi Hammer Your last dose was: Your next dose is: Take 1 Tab by mouth daily. Indications: major depressive disorder 20 mg DILT- mg XR capsule Generic drug:  dilTIAZem XR Your last dose was: Your next dose is: TAKE 1 CAPSULE EVERY DAY  
     
   
   
   
  
 donepezil 10 mg tablet Commonly known as:  ARICEPT Your last dose was: Your next dose is: Take 1 Tab by mouth nightly. 10 mg  
    
   
   
   
  
 enalapril 20 mg tablet Commonly known as:  Villafana Lara Your last dose was: Your next dose is: Take 1 Tab by mouth daily. 20 mg  
    
   
   
   
  
 levETIRAcetam 750 mg ER tablet Commonly known as:  KEPPRA XR Your last dose was: Your next dose is: Take 1 Tab by Mouth Every Night at Bedtime. ondansetron 4 mg disintegrating tablet Commonly known as:  ZOFRAN ODT Your last dose was: Your next dose is: Take 1 Tab by mouth every eight (8) hours as needed for Nausea. 4 mg STOP taking these medications ADVIL PM PO  
   
  
 aspirin delayed-release 81 mg tablet  
   
  
 canagliflozin 100 mg tablet Commonly known as:  Travis Cordia DUREZOL 0.05 % ophthalmic emulsion Generic drug:  Difluprednate ILEVRO 0.3 % Drps Generic drug:  nepafenac VITAMIN D2 50,000 unit capsule Generic drug:  ergocalciferol Where to Get Your Medications Information on where to get these meds will be given to you by the nurse or doctor. ! Ask your nurse or doctor about these medications  
  amLODIPine 10 mg tablet  
 dexamethasone 4 mg tablet  
 folic acid 1 mg tablet Thiamine Mononitrate 100 mg tablet

## 2018-03-06 NOTE — PROGRESS NOTES
Problem: Dysphagia (Adult)  Goal: *Acute Goals and Plan of Care (Insert Text)  Recommendations:  Diet: regular/thin  Meds: per pt preference  Aspiration Precautions  Oral Care TID    Goals:  Patient will:  1. Tolerate PO trials with 0 s/s overt distress in 4/5 trials  2. Utilize compensatory swallow strategies/maneuvers (decrease bite/sip, size/rate, alt. liq/sol) with min cues in 4/5 trials    Speech LAnguage Pathology bedside swallow evaluation    Patient: Autumn Stack (7 y.o. female)  Date: 3/6/2018  Primary Diagnosis: CVA (cerebral vascular accident) [facility])        Precautions: falls       PLOF: lives with family     ASSESSMENT :  Based on the objective data described below, the patient presents with minimal oropharyngeal dysphagia in the setting of stroke secondary to poor endurance at this time. Pt A&Ox4. Functional communication. Intelligibility > 90%. Cognitive-linguistic function appears intact. OM examination revealed oral motor structures functional for mastication and deglutition. Presented with thin liquid and solid trials. Exhibited adequate bolus cohesion, manipulation and A-P transit. Further exhibited (+) swallow timing/reflex and hyolaryngeal excursion. Pt able to manipulate and clear with 0 clinical s/s aspiration and/or oropharyngeal dysphagia. At this time, pt safe for regular solid, thin liquid diet. SLP will follow 1-2 visits for education. D/w RN, Li Angel. Patient will benefit from skilled intervention to address the above impairments.   Patients rehabilitation potential is considered to be Good  Factors which may influence rehabilitation potential include:   [x]            None noted  []            Mental ability/status  []            Medical condition  []            Home/family situation and support systems  []            Safety awareness  []            Pain tolerance/management  []            Other:      PLAN :  Recommendations and Planned Interventions:  Regular/thin  Frequency/Duration: Patient will be followed by speech-language pathology 1-2 visits to address goals. Discharge Recommendations: To Be Determined     SUBJECTIVE:   Patient stated thank you. OBJECTIVE:     Past Medical History:   Diagnosis Date    Ankle fracture, right     [facility] [address] [facility]     Hypertension     Lymphoma in remission ([facility] [address] [facility]     2 spots in brain    Psychiatric disorder     Depression     Past Surgical History:   Procedure Laterality Date    HX HYSTERECTOMY       Prior Level of Function/Home Situation: lives with family      Diet prior to admission: regular  Current Diet:  regular   Cognitive and Communication Status:  Neurologic State: Alert  Orientation Level: Oriented X4  Cognition: Follows commands  Perception: Appears intact  Perseveration: No perseveration noted  Safety/Judgement: Fall prevention  Oral Assessment:  Oral Assessment  Labial: No impairment  Dentition: Natural  Oral Hygiene: Good  Lingual: No impairment  Velum: No impairment  Mandible: No impairment  P.O. Trials:  Patient Position: HOB 30  Vocal quality prior to P.O.: No impairment  Consistency Presented: Thin liquid; Solid  How Presented: Self-fed/presented;Straw;Successive swallows     Bolus Acceptance: No impairment  Bolus Formation/Control: No impairment     Propulsion: No impairment  Oral Residue: None  Initiation of Swallow: No impairment  Laryngeal Elevation: Functional  Aspiration Signs/Symptoms: None  Pharyngeal Phase Characteristics: Poor endurance  Effective Modifications: Small sips and bites  Cues for Modifications: Minimal       Oral Phase Severity: Minimal  Pharyngeal Phase Severity : Minimal    GCODESwallowing:  Swallow Current Status CI= 1-19%   Swallow Goal Status CH= 0%    The severity rating is based on the following outcomes:  JUAN Noms Swallow Level 6    Clinical Judgement    PAIN:  Start of Eval: 0  End of Eval: 0     After treatment:   []            Patient left in no apparent distress sitting up in chair  [x]            Patient left in no apparent distress in bed  [x]            Call bell left within reach  [x]            Nursing notified  []            Family present  []            Caregiver present  []            Bed alarm activated    COMMUNICATION/EDUCATION:   [x]            Aspiration precautions; swallow safety; compensatory techniques. [x]            Patient/family have participated as able in goal setting and plan of care. [x]            Patient/family agree to work toward stated goals and plan of care. []            Patient understands intent and goals of therapy; neutral about participation. []            Patient unable to participate in goal setting/plan of care; educ ongoing with interdisciplinary staff  []         Posted safety precautions in patient's room.     Thank you for this referral.  Yuan Dias, SLP  Time Calculation: 15 mins

## 2018-03-06 NOTE — PROGRESS NOTES
Hematology/Oncology Chart Note  Jaja Salguero  6156/85    Assessment  75 y.o. F with history of CNS lymphoma    Plan  Brain hemorrhage/brain mass  Not sure this is lymphoma. Recurrence 5 years later in a different area of the brain would be unusual.  - Last clinic note pasted below  - She is not a candidate for chemotherapy given her dementia and very frail state (short term memory deficits, unable to care for herself, ambulating with rolling walker) even before this event. We discussed code status at her last visit and I had recommended DNR/DNI due to her low performance status and dementia. - Recommend radiation oncology consultation. I called and spoke with the radiation office. Perhaps she could have palliative radiation to the cerebellum without a biopsy. Given mass effect in cerebellum, may need dexamethasone. Alternatively, move straight to comfort measures. - Recommend palliative care consultation  - extensive d/w pt's dtr Rosa Betters and grandchild. I recommend DNR/DNI. They are leaning toward SNF with hospice services. Dementia  Suspect due at least in part to prior radiation to frontal lobes in 2012. Disposition  Will need SNF, likely with hospice services. Interval  Having more falls  Home safety eval recommended SNF  Pursuing placement  Developed vomiting  Brought to ED    CT head    MRI brain  1. Approximately 2 cm amorphous intraparenchymal acute hemorrhage posterior  superior left frontal lobe with surrounding ring-enhancing and nodular areas of  enhancement in the adjacent frontal lobe parenchyma. This does not have  appearance of hemorrhage associated with infarct. The associated areas of  enhancement would be unusual in the setting of either hypertensive or  angiopathy-related hemorrhage. Recurrent area of lymphoma involvement with  associated hemorrhage should be considered.     2.  Regional lobulated area of parenchymal enhancing lesion involving left  superior cerebellum and vermis with surrounding edema and mass effect, with  small internal petechial hemorrhage, suggesting neoplasm, most likely area of  lymphoma recurrence given history.     3.  Chronic fluid-filled cavities at site of previous enhancing parenchymal  lesions from MRI 2012, involving inferior left frontal lobe and right centrum  semiovale with associated infarct/encephalomalacia mid body corpus callosum.     4. Extensive deep cerebral white matter, basal ganglia thalamic and pontine  signal changes nonspecific but likely postradiation/chemotherapy changes or  other chronic microvascular ischemic disease.     5. Mild diffuse volume loss.         Images reviewed  Seen by Neurology  Urinary incontinence    Eating breakfast  She is not sure when she last vomited  I asked if she had heard about CT or MRI  She did not respond  After 20 seconds she resumed drinking coffee  Reviewed MRI  I am concerned the lymphoma has recurred but treatment options are very limited  No chemo  Possible XRT  Definite placement  Cannot really answer questions  She has no questions for me      D/w Dr. Antonietta Ortega  D/w Dr. Alfredo Vega  D/w Dr. Cara Barry and Natanael Escudero      Visit Vitals    /56    Pulse 61    Temp 98.4 °F (36.9 °C)    Resp 14    Ht 5' 2\" (1.575 m)    Wt 89.4 kg (197 lb)    SpO2 99%    Breastfeeding No    BMI 36.03 kg/m2     Alert, NAD  Very slowed responses, poor eye contact  Spends large amts of interview with eyes closed  PERRL, nonicteric  OP clear  CTAB ant  RRR  BS+, mild diffuse tenderness  No BLE edema  No rash      Recent Results (from the past 12 hour(s))   CBC W/O DIFF    Collection Time: 03/06/18  6:20 PM   Result Value Ref Range    WBC 9.8 4.6 - 13.2 K/uL    RBC 4.48 4.20 - 5.30 M/uL    HGB 12.9 12.0 - 16.0 g/dL    HCT 40.3 35.0 - 45.0 %    MCV 90.0 74.0 - 97.0 FL    MCH 28.8 24.0 - 34.0 PG    MCHC 32.0 31.0 - 37.0 g/dL    RDW 13.5 11.6 - 14.5 %    PLATELET 451 266 - 516 K/uL    MPV 10.7 9.2 - 11.8 FL   PROTHROMBIN TIME + INR    Collection Time: 03/06/18  6:20 PM   Result Value Ref Range    Prothrombin time 13.3 11.5 - 15.2 sec    INR 1.1 0.8 - 1.2     PTT    Collection Time: 03/06/18  6:20 PM   Result Value Ref Range    aPTT 26.7 23.0 - 36.4 SEC   TYPE & SCREEN    Collection Time: 03/06/18  6:20 PM   Result Value Ref Range    Crossmatch Expiration 03/09/2018     ABO/Rh(D) Vernell Remy POSITIVE     Antibody screen NEG    METABOLIC PANEL, BASIC    Collection Time: 03/06/18  6:20 PM   Result Value Ref Range    Sodium 142 136 - 145 mmol/L    Potassium 4.2 3.5 - 5.5 mmol/L    Chloride 108 100 - 108 mmol/L    CO2 26 21 - 32 mmol/L    Anion gap 8 3.0 - 18 mmol/L    Glucose 117 (H) 74 - 99 mg/dL    BUN 19 (H) 7.0 - 18 MG/DL    Creatinine 0.70 0.6 - 1.3 MG/DL    BUN/Creatinine ratio 27 (H) 12 - 20      GFR est AA >60 >60 ml/min/1.73m2    GFR est non-AA >60 >60 ml/min/1.73m2    Calcium 9.0 8.5 - 10.1 MG/DL   MAGNESIUM    Collection Time: 03/06/18  6:20 PM   Result Value Ref Range    Magnesium 2.1 1.6 - 2.6 mg/dL   PHOSPHORUS    Collection Time: 03/06/18  6:20 PM   Result Value Ref Range    Phosphorus 3.1 2.5 - 4.9 MG/DL   HEPATIC FUNCTION PANEL    Collection Time: 03/06/18  6:20 PM   Result Value Ref Range    Protein, total 7.1 6.4 - 8.2 g/dL    Albumin 3.6 3.4 - 5.0 g/dL    Globulin 3.5 2.0 - 4.0 g/dL    A-G Ratio 1.0 0.8 - 1.7      Bilirubin, total 0.3 0.2 - 1.0 MG/DL    Bilirubin, direct <0.1 0.0 - 0.2 MG/DL    Alk.  phosphatase 67 45 - 117 U/L    AST (SGOT) 10 (L) 15 - 37 U/L    ALT (SGPT) 14 13 - 56 U/L   LIPASE    Collection Time: 03/06/18  6:20 PM   Result Value Ref Range    Lipase 86 73 - 393 U/L   CARDIAC PANEL,(CK, CKMB & TROPONIN)    Collection Time: 03/06/18  6:20 PM   Result Value Ref Range    CK 53 26 - 192 U/L    CK - MB 1.2 <3.6 ng/ml    CK-MB Index 2.3 0.0 - 4.0 %    Troponin-I, Qt. <0.02 0.0 - 0.045 NG/ML   SED RATE (ESR)    Collection Time: 03/06/18  6:20 PM   Result Value Ref Range    Sed rate, automated 24 0 - 30 mm/hr   LIPID PANEL    Collection Time: 03/06/18  6:20 PM   Result Value Ref Range    LIPID PROFILE          Cholesterol, total 145 <200 MG/DL    Triglyceride 72 <150 MG/DL    HDL Cholesterol 46 40 - 60 MG/DL    LDL, calculated 84.6 0 - 100 MG/DL    VLDL, calculated 14.4 MG/DL    CHOL/HDL Ratio 3.2 0 - 5.0     GLUCOSE, POC    Collection Time: 03/06/18  9:40 PM   Result Value Ref Range    Glucose (POC) 122 (H) 70 - 110 mg/dL   CBC WITH AUTOMATED DIFF    Collection Time: 03/07/18  4:00 AM   Result Value Ref Range    WBC 8.1 4.6 - 13.2 K/uL    RBC 4.42 4.20 - 5.30 M/uL    HGB 13.1 12.0 - 16.0 g/dL    HCT 40.2 35.0 - 45.0 %    MCV 91.0 74.0 - 97.0 FL    MCH 29.6 24.0 - 34.0 PG    MCHC 32.6 31.0 - 37.0 g/dL    RDW 13.5 11.6 - 14.5 %    PLATELET 580 172 - 607 K/uL    MPV 10.8 9.2 - 11.8 FL    NEUTROPHILS 67 40 - 73 %    LYMPHOCYTES 25 21 - 52 %    MONOCYTES 7 3 - 10 %    EOSINOPHILS 1 0 - 5 %    BASOPHILS 0 0 - 2 %    ABS. NEUTROPHILS 5.4 1.8 - 8.0 K/UL    ABS. LYMPHOCYTES 2.0 0.9 - 3.6 K/UL    ABS. MONOCYTES 0.6 0.05 - 1.2 K/UL    ABS. EOSINOPHILS 0.1 0.0 - 0.4 K/UL    ABS. BASOPHILS 0.0 0.0 - 0.06 K/UL    DF AUTOMATED     METABOLIC PANEL, COMPREHENSIVE    Collection Time: 03/07/18  4:00 AM   Result Value Ref Range    Sodium 142 136 - 145 mmol/L    Potassium 4.6 3.5 - 5.5 mmol/L    Chloride 110 (H) 100 - 108 mmol/L    CO2 23 21 - 32 mmol/L    Anion gap 9 3.0 - 18 mmol/L    Glucose 99 74 - 99 mg/dL    BUN 18 7.0 - 18 MG/DL    Creatinine 0.65 0.6 - 1.3 MG/DL    BUN/Creatinine ratio 28 (H) 12 - 20      GFR est AA >60 >60 ml/min/1.73m2    GFR est non-AA >60 >60 ml/min/1.73m2    Calcium 8.6 8.5 - 10.1 MG/DL    Bilirubin, total 0.4 0.2 - 1.0 MG/DL    ALT (SGPT) 16 13 - 56 U/L    AST (SGOT) 18 15 - 37 U/L    Alk.  phosphatase 61 45 - 117 U/L    Protein, total 6.7 6.4 - 8.2 g/dL    Albumin 3.3 (L) 3.4 - 5.0 g/dL    Globulin 3.4 2.0 - 4.0 g/dL    A-G Ratio 1.0 0.8 - 1.7     MAGNESIUM    Collection Time: 03/07/18  4:00 AM   Result Value Ref Range    Magnesium 2.0 1.6 - 2.6 mg/dL   PHOSPHORUS    Collection Time: 18  4:00 AM   Result Value Ref Range    Phosphorus 3.1 2.5 - 4.9 MG/DL     MDM hi  > 95 minutes spent, > 50% of time in counseling/coordination of care    Lanae Kehr MD  [facility] [phone]  Cell [phone]    Patient Name:  Charla Blanco  MRN#: [id number]  Location:  [facility] Office  :  1950  Seen By:  Magno Gibson MD  Attending Provider:  Magno Gibson  Date of Service:  2017      Chief Complaint:  Ms. Dinah Ring is a very pleasant  female who presented for evaluation of CNS lymphoma. Interval History:  Ms. Dayron Sanders returns to the clinic using a rolling walker and accompanied by her son-in-law. Her blood sugars are in the fair range and her son-in-law administers her insulin in the morning and evenings. Dr. Leesa Encinas, neurologist, sent her for neurocognitive testing. This showed dementia/Alzheimer's and her son-in-law states her memory is declining. The neurologist recommended she get more help at home, but social work evaluation seems to be stalled. They are waiting to hear from a  about assisted living vs. an Alzheimer's unit. Her son-in-law reports that she is not taking her pills properly, and there have been times where she woke at midnight and took her morning pills, thinking it was noon. She has problems dialing the phone, and has lost interest in going out and no longer likes to shop. The family is concerned that she will go outside in the winter night, or possibly wonder off. Today, she is much less interactive than in the past.    She follows with Dr. Shabnam Gamboa on a 6-month bases for her small aneurysm. I worry that radiation contributed, or may be the sole cause of this dementia. Her CNS lymphoma seems to be well behind us, but I am worried about her clinical decline.     I had a discussion with her son-in-law regarding resuscitation and code status, and explained that this is something to think about. I discussed with her I worry that if her heart or breathing stopped, then further brain damage from lack of oxygen could certainly worsen her dementia. I gave them a blank DNR form, and her son-in-law will discuss that with Mrs. Gricel Godwin daughter and her son in [facility]. If she would like to pursue this, she will bring it back in so I can sign it. Plan:  1. CNS lymphoma. She is now status post short course of radiation therapy to the frontal lobes in Summer 2012, followed by induction and consolidation HDMTX.   - Will follow her clinically and consider imaging if symptomatic. Not planning serial surveillance MRI right now given that she is not a good candidate for additional therapy due to her performance status. Dementia  Suspect delayed effect of radiation  - On donepezil with Dr. Zuleima Hoffmann  - Neurology recommended social work evaluation and more help at home  - 12/2017 discussed code status. Son-in-law will discuss with patient's daughter. Blank DNR form provided. Seizures  - On levetiracetam with Dr. Zuleima Hoffmann    Family history of hemochromatosis  Maternal nieces/nephews reportedly have hemochromatosis, and her daughter and granddaughter reportedly have \"high iron\" but have not had genetic testing. Iron, TIBC, ferritin normal 2016    DISPOSITION:    RTC 6 months. History:  Hematologic History:  1. CNS lymphoma.  06/2012,  Began to develop headaches and her coworkers noted slowed performance at work. This progressed to develop left arm and left leg weakness. 06/18/12, Seen in the emergency department for left-sided weakness and CT scan of the head showed 1.5 x 1.7 cm intra-axial mass in the high right frontal lobe with associated vasogenic edema as well as a second mass in the inferior frontal lobe of the left side, measuring 1.8 x 1.4 cm with adjacent edema, highly suspicious for metastatic disease.   06/18/12, MRI scan of the brain showed bilateral frontal lobe masses, highly suspicious for metastatic disease. There may be tiny satellite nodules adjacent to the right frontal mass as well as a left posterior frontal lobe mass, also concerning for metastatic disease. She was seen by Neurosurgery. 06/19/12, CA-125 was 11,  was 20, and ACE level was 60. Hemoglobin A1c was greater than 9.  06/19/12, CT scan of the chest, abdomen, and pelvis without contrast showed hepatic steatosis. She was status post hysterectomy. No mass or adenopathy was seen to suggest malignancy. 07/02/12, PET/CT showed abnormal activity in bifrontal intracranial lesions. No convincing PET/CT evidence for primary malignancy. There was an asymmetric activity in the left posterior pelvis in the region of the rectosigmoid colon, but there was no convincing CT abnormality. There was some minimal activity involving the distal right femur and given the low level activity of benign etiology was favored. Max SUV in the left frontal lesion was 16.1 and max SUV in the right frontal was 21.1. She began to use a rolling walker at her first admission in June 2012.  07/05/12, MRI scan of the brain for localization with stereotactic biopsy was performed, showing interval increase in both frontal lobe lesions. There was increasing mass affect in the left frontal lobe as well as some midline shift. 07/06/12, Underwent brain biopsy which subsequently showed diffuse large B-cell lymphoma, CD20 positive. She was discharged from the hospital on 06/08/12, but then began speaking less, and had decreased p.o. intake. 07/10/12, Brought back to the emergency department and CT scan on 07/10/12 showed increase in size of the left frontal lobe lesion which may represent minimal postoperative changes. Left frontal lobe lesion previously measured 2.9 cm, and now measured 3.3 cm. The right frontal lobe lesion was stable at 2.3 cm. She was placed on high-dose Dexamethasone.  She would open her eyes, but would not speak at all. She was not able to move around or even sit up in bed. She was started on brain radiation to the frontal lobes on 07/13/12, and this continued until 07/27/12. Significant functional improvement with radiation. 09/25/12, C1 of HDMTX + rituximab. Cycles q 2 weeks. Rituximab with first 4 cycles. 09/11/12, MRI scan after radiation and before chemotherapy shows positive response to treatment with radiation. Left frontal lobe lesion is larger than on previous MRI, but was less solid mass-like central enhancement. The right posterior frontal lobe lesion was significantly smaller than on the previous examination with some residual enhancement. Both lesions had only trace tumoral perfusion, raising possibility that residual enhancement may be related to radiation necrosis versus some residual viable tumor. 10/23/12, MRI after cycle #2 shows stable size of the left frontal lobe mass with slightly diminished surrounding white matter reaction, likely vasogenic edema compared to a month prior. Decreased size of the enhancement and surrounding white matter reaction along the centrum semiovale. New faint non-mass like enhancing signal in the left cerebellum is nonspecific.  11/19/12, Left frontal lobe with stable appearance and surrounding white matter reaction. Residual peripheral irregular enhancement and tumor perfusion profile on perfusion maps. I discussed the November MRI with Dr. Eugenio Ron. Perfusion scan was done on the MRI from 11/19/12, but not on the MRI from 10/23/12. Overall, the November MRI was stable versus very minimally better after cycle #4 compared to after cycle #2. He did favor residual lymphoma rather than just radiation effect due to the perfusion pattern, after he compared MRIs from September, October, and November.  Comparison was a little challenging since she has had scans on two different MRI at [address] was to continue Methotrexate on schedule for now versus stopping chemotherapy and monitoring versus additional radiation therapy. 12/14/12, repeat brain MRI with perfusion and diffusion imaging shows favorable response to treatment. The mass in the left frontal lobe is minimally smaller, now 2.6 x 1.9 x 1.8 cm, and previously 2.7 x 2.1 x 2 cm. There has been a marked interval decrease in peripheral cerebral blood flow and cerebral blood volume. There is some moderate interval increase in some T2 abnormalities that was felt to likely represent progression of treatment related changes. 01/06/13, repeat brain MRI with perfusion imaging was stable. After discussion with the patient, we will proceed with consolidation therapy every four weeks x4 cycles. Reference is her Socorro Lopez and Aissatou Bronson, in Neuro Oncology published in 2010.  03/14/13, MRI scan after cycle 2 of consolidation is stable. There may be continued decrease in peripheral restricted diffusion signal. Slightly increased cerebral blood flow and cerebral blood volume suggest some component of residual tumor. 05/16/13, Brain MRI stable. 07/16/13, Brain MRI stable. 09/12/13, Brain MRI shows enhancing cystic left frontal lesion measures 3 cm, stable back to 05/13. There is a small amount of abnormal increased perfusion, but negative mass affect regionally. The lesion is nonspecific. The stability was felt to be atypical for both persistent lymphoma as well as sterilize/recovering brain, but either one is possible. We discussed the option of possible CyberKnife to that area, but she already has some memory difficulty, which was attributed to her previous radiation. Without clear evidence of progression, we will hold off on additional radiation therapy for now. 11/18/13, MRI of the brain shows similar appearance of the left anterior frontal lobe cystic appearing lesion. The peripheral enhancement is decreasing over time and nearly resolved.  The relative stability was felt to be not typical for CNS lymphoma and this may be treated tumor, but residual tumor was not excluded and continued surveillance was suggested. There is progression of asymmetric dural thickening and enhancement over the left anterior frontal convexity. Uncertain significance, maybe treatment related. Attention on followup.  01/17/14, MRI brain stable. 04/03/14, MRI brain stable  07/08/14, MRI brain stable. 10/21/14, MRI brain stable  01/14/15, MRI brain stable  07/01/15, MRI Brain stable  02/17/16, MRI brain stable  03/03/17, MRI head  1. Technologists were unable to obtain IV access and the examination was performed without contrast.  2. There is new 3 mm thickness fluid-fluid or debris level within the left anterior frontal lobe cavity. The dependent material could be subacute blood products. - The provided clinic notes indicate that the patient had a recent fall, and this could be related blood. There are no other findings of acute intracranial hemorrhage or acute intracranial trauma. - Short term followup head CT may be useful to confirm resolution of apparent blood products. 3. No definite findings of recurrent lymphoma. Lack of IV contrast does limit detection, but besides the fluid-fluid level in the left normal cavity, the noncontrast portion of the exam is stable from several recent comparison studies. 03/15/17, MRI brain with contrast  1. Compared with 09/2/2016 brain MRI slight increase in apparent enhancement along the superior lateral margin of the left frontal lobe resection cavity. This does not appear particularly masslike, but new or increased from 9/6468.     - Uncertain if related to the presumed subacute blood products layering within the cavity better seen on the noncontrast only exam of 03/03/17.     - Not strongly suspicious for tumor recurrence on DWI or perfusion images, but would consider shorter interval followup exam to reassess. 2. Otherwise, examination appears stable from 09/02/2016.  No change in the hypointense cerebral white matter signal. Presumed 3 mm aneurysm posterior wall left MCA M1 segment appears stable. A 5 mm enhancing nodular focus at the left anterior middle cranial fossa dura that may be small incidental meningioma appears unchanged. 04/14/17, Syncope in setting of UTI.  06/12/17, MRI brain  No interval change since exam 3 months earlier. No specific findings to strongly suggest tumor recurrence. Stable trace curvilinear enhancement or artifact related to hemosiderin staining along the malacic cavity of the left frontal lobe. Stable extensive non-masslike white matter changes throughout the cerebral hemispheres and extending into the brainstem, likely post therapeutic change. Stable 3.6 mm flow-void along the proximal left M1 segment, suspicious for aneurysm. Past Medical History:  CNS lymphoma. History of seizures  Dementia s/p neurocognitive testing 2017  Diabetes. Hypertension. Seasonal allergies  Past Surgical History:  History of multiple breast biopsies and lumpectomies. Status post hysterectomy in 1987. Status post brain biopsy on 07/06/12. Status post RIGHT ankle ORIF in 01/2014  Cataract extractions in 2016  Review of Systems:  Constitutional: No fever, No chills, No night sweats. Fatigue:  Weight loss:  Neurologic: No confusion; No seizures; No syncope; No tremor; No speech change; No hiccups; No sensory changes. Memory loss:  Muscle weakness: bilateral, upper extremity. Abnormal gait:    Vitals:  Blood pressure: 120/76, Pulse: 63, Temperature: 97.7 F, Respirations: 15, O2 sat: 93%, Pain Scale: 0, Height: 62.5 in, Weight: 200 lb, BSA: 2, BMI: 36 kg/m2  BMI: 36 kg/m2    Physical Exam:  Constitutional: Normal appearance, no acute distress. Ambulating with rolling walker. Eyes: Conjunctivae normal, eyelids normal, PERRLA, anicteric. Ears, nose, throat: External ears and nose normal, hearing grossly normal, oropharynx unremarkable.    Neck: Supple, no masses. Respiratory: Breathing comfortably, lungs clear to auscultation and percussion. Cardiovascular: Normal heart sounds, heart rhythm regular, no peripheral edema. Abdomen: No masses, no tenderness, bowel sounds normal.  Lymph nodes: No cervical adenopathy, no axillary adenopathy, no supraclavicular adenopathy. Skin: No rash, no petechiae, no ecchymosis, no pallor, no cutaneous nodules. Psychiatric: Minimally interactive, flat affect. Laboratory:         Current Medications:  Aspirin Oral 81 mg 1 TABLET(S) PO daily  Canagliflozin Oral 100 mg tablet 1 TABLET(S) PO daily  Citalopram Oral 20 mg tablet 1 TABLET(S) PO daily  Diltiazem Oral 24 hr Cap 180 mg capsule,extended release 24hr 1 CAPSULE(S) PO QAM  Donepezil Oral 10 mg tablet 1 tablet po daily  Enalapril Oral 10 mg tablet 1 TABLET(S) PO daily  Ergocalciferol Oral 50,000 unit capsule 1 CAPSULE(S) PO Q7D  Ibuprofen Oral 100 mg tablet 1 TABLET(S) PO Q8H PRN pain  Insulin Aspart Subcutaneous 100 unit/mL solution 35 UNIT sub-Q as directed  Insulin Detemir Subcutaneous 100 unit/mL 100 unit/mL (3 mL) insulin pen 35 ML sub-Q as directed  Keppra (Levetiracetam Oral (Keppra)  Allergies & Adverse Reactions:  IV Dye, Iodine Containing Contrast Media    Health Maintenance:  Former smoker  Flu vaccine - Adult (2017)       Problem List:  Neoplasm of brain (disorder)        New Orders:  12/29/2017, RTC MD, Perform Date: 6 Months, Associated problem(s): Neoplasm of brain (disorder) * (C71.1)                               Maryann Kilpatrick M.D. Documentation provided by jossue Raymond for Dr. Mariana Rios on 12/29/17    Send copy of note to:   MD Hayes Pathak MD Delcia Bramble, MD Belvin Halim, DO        Electronically signed by Maryann Kilpatrick MD 01/03/2018 13:36 EST

## 2018-03-07 PROBLEM — R53.81 DEBILITY: Status: ACTIVE | Noted: 2018-01-01

## 2018-03-07 PROBLEM — F02.80 DEMENTIA ASSOCIATED WITH OTHER UNDERLYING DISEASE: Status: ACTIVE | Noted: 2018-01-01

## 2018-03-07 PROBLEM — I61.9 HEMORRHAGE OF BRAIN, NONTRAUMATIC (HCC): Status: ACTIVE | Noted: 2018-01-01

## 2018-03-07 NOTE — PROGRESS NOTES
0700 Bedside and Verbal shift change report received from [facility] . Report included the following information SBAR, Kardex, ED Summary, Intake/Output, MAR and Recent Results. Pt was alert and able to make her needs known. Dual NIH  Done. Denied any pain. 0800 family by bedside  0830 Pt was seen by oncologist. No new orders. 1900 Bedside and Verbal shift change report given to May ROSSI (oncoming nurse) by Estelita Lou   (offgoing nurse). Report included the following information SBAR, Kardex, ED Summary, Intake/Output, MAR and Recent Results.

## 2018-03-07 NOTE — PROGRESS NOTES
[facility]: [phone] ([phone])  [facility]: [address] [place] [address] Way: [phone]    Patient Name: Karen Workman  YOB: 1950    Date of Initial Consult: 3/7/2018   Reason for Consult: care decisions  Requesting Provider: Ariane Banda   Primary Care Physician: Stef Palmer MD      SUMMARY:   Karen Workman is a 75y.o. year old with a past history of diabetes, hypertension, lymphoma s/p chemo and radiation , dementia, who was admitted on 3/6/2018 from home with a diagnosis of hemorrhage with the lateral left frontal lobe with possible recurrent lymphoma . Current medical issues leading to Palliative Medicine involvement include: 75year old female with brain hemorrhage possible recurrent lymphoma and dementia. Palliative medicine is consulted to discuss care options. PALLIATIVE DIAGNOSES:   1. Advanced care plan discussions   2. Brain hemorrhage     3. Dementia     4. Debility        PLAN:   1. Patient seen at bedside along with Dr. Bryant Dean. Ms. Kindra Domingo is alert, oriented x 2, responses are slow. Could not tell me why she is in the hospital. Her daughter Fredricka Schilder and grandson William Plunk at the bedside. Social; never , 2 children, son and daughter, daughter [facility], son lives in [place]. Retired as a . She lives with her daughter. 2. Advanced care plan discussions Jaime Suggs has executed an AMD, naming her daughter Fredricka Schilder as MPOA and son in law [facility] as secondary. Jaime Suggs although alert oriented is not able to participate in complex decisions. Discussed at length with her daughter Fredricka Schilder. She has been well updated from all consultants and understands this could be a recurrent lymphoma. Appreciate oncology assistance, discussed with Dr. Ariane Banda, not a candidate for chemotherapy due to dementia and performance score. Daughter is aware of this. Radiation Oncology has been consulted and Fredricka Schilder wishes to also have their opinion before making any final care decisions. Goals of care discussed with daughter Richard Vuong, burdens of heroics at end of life in chronic progressive illness. She does not wish for her mother to suffer and feels she would not want these aggressive measures. DDNR signed by Richard Vuong. DNR/DNI  Care decisions; await radiation oncology consult. Richard Vuong will discuss all information with her family and make a decision whether to proceed with radiation or move straight to hospice most probably at a nursing facility. Will also ask case management to help. We also discussed feeding options should that become problematic. Per Gilberto Ramon should be allowed to eat and drink for comfort, no feeding tubes, temporary or permanent. We will re meet with Richard Vuong tomorrow. 3. Brain hemorrhage/  alert verbal, speech and processing slow. No recent vomiting,   4. Dementia formally dx by neurology, some could be secondary to previous RT. Family has seen a decline in function. Family also aware this a progressive terminal disease  5. Debility had several falls, uses walker for ambulation. Needs assistance with ADL's and IADL's. Incontinent of urine. Short term memory loss. Daughter and  work, no longer safe to stay alone. Has recently been evaluated by  and per daughter was deemed appropriate for long term care. 6. Initial consult note routed to primary continuity provider  7.  Communicated plan of care with: Palliative IDT, daughter, oncology     GOALS OF CARE:         TREATMENT PREFERENCES:   Code Status: DNR    Advance Care Planning:  Advance Care Planning 3/6/2018   Patient's Healthcare Decision Maker is: Legal Next of Valeria 68   Primary Decision Maker Name Juan Carlos Gonsalez   Primary Decision Maker Phone Number [phone]   Confirm Advance Directive Yes, on file   Does the patient have other document types Power of            Other Instructions:   Artificially Administered Nutrition: No feeding tube     Other:  As far as possible, the palliative care team has discussed with patient / health care proxy about goals of care / treatment preferences for patient. HISTORY:     History obtained from: chart and daughter     CHIEF COMPLAINT: vomiting     HPI/SUBJECTIVE:    The patient is:   [] Verbal and participatory  [x] Non-participatory due to: slow mental processing,   75year old female presented with vomiting, found to have brain hemorrhage concern for recurrent lymphoma. Has been seen by oncology not a candidate for chemotherapy, radiation oncology has been consulted. Patient also has dementia. She is currently comfortable, not vomiting. Family at bedside, goals of care and care discussions discussed. Clinical Pain Assessment (nonverbal scale for nonverbal patients): Clinical Pain Assessment  Severity: 0          Duration: for how long has pt been experiencing pain (e.g., 2 days, 1 month, years)  Frequency: how often pain is an issue (e.g., several times per day, once every few days, constant)     FUNCTIONAL ASSESSMENT:     Palliative Performance Scale (PPS):  PPS: 30    ECOG  ECOG Status : Completely disabled     PSYCHOSOCIAL/SPIRITUAL SCREENING:      Any spiritual / Yarsani concerns:  [] Yes /  [x] No    Caregiver Burnout:  [] Yes /  [x] No /  [] No Caregiver Present      Anticipatory grief assessment:   [x] Normal  / [] Maladaptive        REVIEW OF SYSTEMS:     Positive and pertinent negative findings in ROS are noted above in HPI. The following systems were [] reviewed / [x] unable to be reviewed as noted in HPI  Other findings are noted below. Systems: constitutional, ears/nose/mouth/throat, respiratory, gastrointestinal, genitourinary, musculoskeletal, integumentary, neurologic, psychiatric, endocrine. Positive findings noted below.   Modified ESAS Completed by: provider   Fatigue: 5       Pain: 0     Nausea: 0     Dyspnea: 0     Constipation: No              PHYSICAL EXAM:     Wt Readings from Last 3 Encounters: 03/06/18 89.4 kg (197 lb)   03/06/18 88 kg (194 lb)   02/26/18 88.3 kg (194 lb 9.6 oz)     Blood pressure 143/56, pulse (!) 57, temperature 98.4 °F (36.9 °C), resp. rate 14, height 5' 2\" (1.575 m), weight 89.4 kg (197 lb), SpO2 97 %, not currently breastfeeding. Pain:  Pain Scale 1: Numeric (0 - 10)  Pain Intensity 1: 0                     Constitutional: sitting up in bed, comfortable appearing, verbal but responses slow, in NAD   Eyes: pupils equal, anicteric  Respiratory: breathing not labored  Skin: warm, dry  Neurologic: alert oriented x 2, knows her family by name, very slow to process, did not know why she is in the hospital         HISTORY:     Active Problems:    CVA (cerebral vascular accident) ([facility] [address] (3/6/2018)      Past Medical History:   Diagnosis Date    Ankle fracture, right     Diabetes ([facility] [address]     Hypertension     Lymphoma in remission ([facility] [address]     2 spots in brain    Psychiatric disorder     Depression      Past Surgical History:   Procedure Laterality Date    HX HYSTERECTOMY        History reviewed. No pertinent family history. History reviewed, no pertinent family history.   Social History   Substance Use Topics    Smoking status: Former Smoker    Smokeless tobacco: Never Used    Alcohol use No     Allergies   Allergen Reactions    Latex, Natural Rubber Other (comments)     Swelling in hands and lips    Iodine Anaphylaxis and Swelling      Current Facility-Administered Medications   Medication Dose Route Frequency    dextrose 5% and 0.9% NaCl infusion  67 mL/hr IntraVENous CONTINUOUS    citalopram (CELEXA) tablet 20 mg  20 mg Oral DAILY    donepezil (ARICEPT) tablet 10 mg  10 mg Oral QHS    enalapril (VASOTEC) tablet 20 mg  20 mg Oral DAILY    ELECTROLYTE REPLACEMENT PROTOCOL  1 Each Other Q8H    ELECTROLYTE REPLACEMENT PROTOCOL  1 Each Other Q8H    ELECTROLYTE REPLACEMENT PROTOCOL  1 Each Other Q8H    PHARMACY INFORMATION NOTE  1 Each Other DAILY    ondansetron (ZOFRAN) injection 2 mg  2 mg IntraVENous Q6H PRN    labetalol (NORMODYNE;TRANDATE) 20 mg/4 mL (5 mg/mL) injection 5 mg  5 mg IntraVENous Q15MIN PRN    acetaminophen (TYLENOL) tablet 650 mg  650 mg Oral Q4H PRN    acetaminophen (TYLENOL) suppository 650 mg  650 mg Rectal Q4H PRN    senna-docusate (PERICOLACE) 8.6-50 mg per tablet 2 Tab  2 Tab Oral QHS    albuterol (PROVENTIL VENTOLIN) nebulizer solution 2.5 mg  2.5 mg Nebulization C0W PRN    folic acid (FOLVITE) 1 mg, thiamine (B-1) 100 mg in 0.9% sodium chloride 50 mL ivpb   IntraVENous ACD    pantoprazole (PROTONIX) tablet 40 mg  40 mg Oral Q12H    Or    pantoprazole (PROTONIX) granules for oral suspension 40 mg  40 mg Oral Q12H    Or    pantoprazole (PROTONIX) 40 mg in sodium chloride 10 mL injection  40 mg IntraVENous Q12H    insulin lispro (HUMALOG) injection   SubCUTAneous AC&HS    glucose chewable tablet 16 g  16 g Oral PRN    glucagon (GLUCAGEN) injection 1 mg  1 mg IntraMUSCular PRN    dextrose (D50W) injection syrg 12.5-25 g  25-50 mL IntraVENous PRN    niCARdipine (CARDENE) 25 mg in 0.9% sodium chloride 250 mL infusion  0-15 mg/hr IntraVENous TITRATE        LAB AND IMAGING FINDINGS:     Lab Results   Component Value Date/Time    WBC 8.1 03/07/2018 04:00 AM    HGB 13.1 03/07/2018 04:00 AM    PLATELET 499 24/26/5867 04:00 AM     Lab Results   Component Value Date/Time    Sodium 142 03/07/2018 04:00 AM    Potassium 4.6 03/07/2018 04:00 AM    Chloride 110 (H) 03/07/2018 04:00 AM    CO2 23 03/07/2018 04:00 AM    BUN 18 03/07/2018 04:00 AM    Creatinine 0.65 03/07/2018 04:00 AM    Calcium 8.6 03/07/2018 04:00 AM    Magnesium 2.0 03/07/2018 04:00 AM    Phosphorus 3.1 03/07/2018 04:00 AM      Lab Results   Component Value Date/Time    AST (SGOT) 18 03/07/2018 04:00 AM    Alk.  phosphatase 61 03/07/2018 04:00 AM    Protein, total 6.7 03/07/2018 04:00 AM    Albumin 3.3 (L) 03/07/2018 04:00 AM    Globulin 3.4 03/07/2018 04:00 AM     Lab Results   Component Value Date/Time    INR 1.1 03/06/2018 06:20 PM    Prothrombin time 13.3 03/06/2018 06:20 PM    aPTT 26.7 03/06/2018 06:20 PM      No results found for: IRON, FE, TIBC, IBCT, PSAT, FERR   No results found for: PH, PCO2, PO2  No components found for: [place]   Lab Results   Component Value Date/Time    CK 53 03/06/2018 06:20 PM    CK - MB 1.2 03/06/2018 06:20 PM              Total time: 70 minutes  Counseling / coordination time, spent as noted above: 50 minutes  > 50% counseling / coordination: yes with daughter, oncology     Prolonged service was provided for  []30 min   []75 min in face to face time in the presence of the patient, spent as noted above. Time Start:   Time End:   Note: this can only be billed with 86657 (initial) or 37970 (follow up). If multiple start / stop times, list each separately.

## 2018-03-07 NOTE — PROGRESS NOTES
Problem: Motor Speech Impaired (Adult)  Goal: *Acute Goals and Plan of Care (Insert Text)  Based on the objective data described below, the patient presents with intact expressive communication in the setting of stroke. Pt fluent but with decreased breath support. Intelligibility > 90%. Family at b/s reports this has been a result of hx lymphoma. Pt with intact basic auditory comprehension. Naming and repetition intact. SLP educated pt and family of compensatory intelligibility techniques S-O-S (Speak Up, Over-articulate, Slow Down). All parties verbalized comprehension. At this time, formal ST needs for motor/expressive speech not indicated. SLP recommending follow up with home health or SNF therapy upon d/c. Thank you for this referral.  Kamla Members. Mario Tapia., [address] [place] [address]  Office: [phone]  Pager: [phone]      Outcome: Resolved/Met Date Met: 03/07/18  Speech LAnguage Pathology evaluation/discharge    Patient: Pilar Hunt (9 y.o. female)  Date: 3/7/2018  Primary Diagnosis: CVA (cerebral vascular accident) [facility])        Precautions:      PLOF: lives with family   ASSESSMENT :  See above. Patient will benefit from skilled intervention to address the above impairments. Patients rehabilitation potential is considered to be Fair  Factors which may influence rehabilitation potential include:   []              None noted  []              Mental ability/status  [x]              Medical condition  []              Home/family situation and support systems  []              Safety awareness  []              Pain tolerance/management  []              Other:      PLAN :  Recommendations and Planned Interventions:  N/A - eval only. 0 ST needs ID'd at time of evaluation. Frequency/Duration: N/A - eval only. 0 ST needs ID'd at time of evaluation. Discharge Recommendations: Home Health and [address] [place] [address]:   Patient stated Robe Garrettsveta.     OBJECTIVE:     Past Medical History:   Diagnosis Date    Ankle fracture, right     Diabetes ([facility] [address]     Hypertension     Lymphoma in remission ([facility] [address]     2 spots in brain    Psychiatric disorder     Depression     Past Surgical History:   Procedure Laterality Date    HX HYSTERECTOMY       Prior Level of Function/Home Situation: lives with family   Home Situation  Home Environment: Private residence  # Steps to Enter: 3  One/Two Story Residence: One story  Living Alone: No  Support Systems: Family member(s)  Patient Expects to be Discharged to[de-identified] Unknown  Current DME Used/Available at Home: Walker, rolling  Tub or Shower Type: Shower (no grab bars or shower chair)  Mental Status:  Neurologic State: Alert  Orientation Level: Oriented X4  Cognition: Follows commands (follows commands with increased time)  Perception: Appears intact  Perseveration: No perseveration noted  Safety/Judgement: Awareness of environment, Fall prevention        Vocal Quality: Low volume      PAIN:  Start of Eval: 0  End of Eval: 0     After treatment:   []              Patient left in no apparent distress sitting up in chair  [x]              Patient left in no apparent distress in bed  [x]              Call bell left within reach  [x]              Nursing notified  []              Caregiver present  []              Bed alarm activated    COMMUNICATION/EDUCATION:   [x] Patient/family have participated as able in goal setting and plan of care. [x]  Patient/family agree to work toward stated goals and plan of care. []  Patient understands intent and goals of therapy, but is neutral about his/her participation. []  Patient is unable to participate in goal setting and plan of care.     Thank you for this referral.  Betzy Schwarz, SLP  Time Calculation: 10 minutes

## 2018-03-07 NOTE — PROGRESS NOTES
Progress Note      Patient: Danney Sandifer               Sex: female          DOA: 3/6/2018       YOB: 1950      Age:  75 y.o.        LOS:  LOS: 1 day               Subjective:   Help of hem onc ,neurology and palliative care greatly appreciated . pt is now a dnr . She will go home with hospice or go to a facility with hospice . The pt may or may not have radiation therapy     Objective:      Visit Vitals    /50    Pulse 60    Temp 98.4 °F (36.9 °C)    Resp 16    Ht 5' 2\" (1.575 m)    Wt 89.4 kg (197 lb)    SpO2 99%    Breastfeeding No    BMI 36.03 kg/m2       Physical Exam:  Pt is awake and has a flat affect but appears to be comfortable   Heart reg rate and rhythm   Lungs good breath sounds heard    Abdomen soft and nontender   Neuro dementia  and her speech is slow .       Lab/Data Reviewed:  BMP:   Lab Results   Component Value Date/Time     03/07/2018 04:00 AM    K 4.6 03/07/2018 04:00 AM     (H) 03/07/2018 04:00 AM    CO2 23 03/07/2018 04:00 AM    AGAP 9 03/07/2018 04:00 AM    GLU 99 03/07/2018 04:00 AM    BUN 18 03/07/2018 04:00 AM    CREA 0.65 03/07/2018 04:00 AM    GFRAA >60 03/07/2018 04:00 AM    GFRNA >60 03/07/2018 04:00 AM     CMP:   Lab Results   Component Value Date/Time     03/07/2018 04:00 AM    K 4.6 03/07/2018 04:00 AM     (H) 03/07/2018 04:00 AM    CO2 23 03/07/2018 04:00 AM    AGAP 9 03/07/2018 04:00 AM    GLU 99 03/07/2018 04:00 AM    BUN 18 03/07/2018 04:00 AM    CREA 0.65 03/07/2018 04:00 AM    GFRAA >60 03/07/2018 04:00 AM    GFRNA >60 03/07/2018 04:00 AM    CA 8.6 03/07/2018 04:00 AM    MG 2.0 03/07/2018 04:00 AM    PHOS 3.1 03/07/2018 04:00 AM    ALB 3.3 (L) 03/07/2018 04:00 AM    TP 6.7 03/07/2018 04:00 AM    GLOB 3.4 03/07/2018 04:00 AM    AGRAT 1.0 03/07/2018 04:00 AM    SGOT 18 03/07/2018 04:00 AM    ALT 16 03/07/2018 04:00 AM     CBC:   Lab Results   Component Value Date/Time    WBC 8.1 03/07/2018 04:00 AM    HGB 13.1 03/07/2018 04:00 AM    HCT 40.2 03/07/2018 04:00 AM     03/07/2018 04:00 AM           Assessment/Plan     Active Problems:    CVA (cerebral vascular accident) ([facility] [address] (3/6/2018)      Hemorrhage of brain, nontraumatic ([facility] [address] (3/7/2018)      Dementia associated with other underlying disease (3/7/2018)      Debility (3/7/2018)  Recurrent cns large cell lymphoma  With hemorrhage . Plan: agree that hospice is the appropriate step for this patient .

## 2018-03-07 NOTE — PROCEDURES
Eddie Perkins  MR#: [id number]  : 1950  ACCOUNT #: [de-identified]   DATE OF SERVICE: 2018    EEG NUMBER:  [id number]    CLINICAL INFORMATION:  The patient has left frontal hemorrhage and a history of seizure disorder. EEG was requested to assess whether she needs anticonvulsant therapy. DESCRIPTION:  This EEG was obtained in the awake state. The waking background is characterized by 10 cycle per second alpha rhythms posteriorly with a normal posterior to anterior gradient on the right. On the left side, intermittent slowing is noted in the frontal and central regions. Photic stimulation did not alter the patient's background. Hyperventilation was not feasible. Sleep was not obtained. IMPRESSION:  This EEG is abnormal due to slowing affecting the left frontal and central electrodes, consistent with the known history of left frontal hemorrhage. No epileptiform activity was detected.       Gerldine Sandhoff, MD       [facility] / Radha Talbot  D: 2018 17:12     T: 2018 17:37  JOB #: [id number]

## 2018-03-07 NOTE — CONSULTS
Neurology Consult  3/7/2018     HPI: This is a 75y.o. 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 Treated by Dr. Duke Calloway with chemotherapy and XRT. Dx complicated by seizure disorder. She presents now with nausea and vomiting and was found to have a left frontal hemorrhage. MRI discloses multiple ring-enhancing areas around the hemorrhage as well as a second area of abnormality in the cerebellum. EEG shows left frontal slowing   but no epileptiform change. PMH:     Past Medical History:   Diagnosis Date    Ankle fracture, right     Diabetes ([address]     Hypertension     Lymphoma in remission ([address]     2 spots in brain    Psychiatric disorder     Depression       SH: There is no history of substance abuse. FH: is negative for inherited neurologic disease    PE:       Neurologically, the patient was alert . Visual fields were intact to confrontation. Eye movements were full and conjugate, saccades were accurate, pursuit movements were smooth, and there was  Nystagmus on gaze to the right. Facial strength and sensation were intact. The palate and tongue moved in the midline. Sternomastoid and trapezius muscles were strong. Motor examination no pronator drift. Finger taps were ravi and symmetric. Follows commands well. Findings:   Left arm ataxia  Mild cognitive slowing  normal neurologic examination     Impression: Likely recurrent lymphoma  Do not recommend  anticonvulsant medication  Oncology recommendations noted: Will follow peripherally.

## 2018-03-07 NOTE — PROGRESS NOTES
Hematology/Oncology Chart Note  Radha Obrien  1160/51      Remains in ICU. D/w Dr. Alfredo Vega yesterday - Mrs. Dajuan Cartagena and her daughter declined palliative XRT  Now DNR. Appreciate Palliative Care evaluation. Given persistent nausea, will start low-dose dexamethasone 1 mg BID to reduce cerebral edema. May need to be titrated upward    Will need SNF with hospice, pending Medicaid approval    Recent Results (from the past 12 hour(s))   GLUCOSE, POC    Collection Time: 03/07/18  9:22 PM   Result Value Ref Range    Glucose (POC) 146 (H) 70 - 110 mg/dL   CBC WITH AUTOMATED DIFF    Collection Time: 03/08/18  4:30 AM   Result Value Ref Range    WBC 7.6 4.6 - 13.2 K/uL    RBC 4.76 4.20 - 5.30 M/uL    HGB 13.8 12.0 - 16.0 g/dL    HCT 42.3 35.0 - 45.0 %    MCV 88.9 74.0 - 97.0 FL    MCH 29.0 24.0 - 34.0 PG    MCHC 32.6 31.0 - 37.0 g/dL    RDW 13.2 11.6 - 14.5 %    PLATELET 358 182 - 782 K/uL    MPV 10.5 9.2 - 11.8 FL    NEUTROPHILS 60 40 - 73 %    LYMPHOCYTES 29 21 - 52 %    MONOCYTES 8 3 - 10 %    EOSINOPHILS 2 0 - 5 %    BASOPHILS 1 0 - 2 %    ABS. NEUTROPHILS 4.6 1.8 - 8.0 K/UL    ABS. LYMPHOCYTES 2.2 0.9 - 3.6 K/UL    ABS. MONOCYTES 0.6 0.05 - 1.2 K/UL    ABS. EOSINOPHILS 0.2 0.0 - 0.4 K/UL    ABS.  BASOPHILS 0.1 (H) 0.0 - 0.06 K/UL    DF AUTOMATED           Gomez Sharp MD  [facility] [phone]  Cell [phone]

## 2018-03-07 NOTE — PROGRESS NOTES
Problem: Self Care Deficits Care Plan (Adult)  Goal: *Acute Goals and Plan of Care (Insert Text)  Occupational Therapy Goals  Initiated 3/7/2018 within 7 day(s). Pt seen at bed level; will re-evaluate with OOB activity. 1.  Patient will perform grooming tasks with supervision/set-up. 2.  Patient will perform functional task for 8 minutes with minimal assistance to incorporate LUE to increase coordination and control. 3.  Patient will perform upper body dressing with supervision/set-up. 4.  Patient will participate in upper extremity therapeutic exercise/activities with supervision/set-up for 8 minutes to increase LUE strength/control for functional transfers and ADLs. 5.  Patient will utilize energy conservation techniques during functional activities with minimal verbal cues. Outcome: Progressing Towards Goal  Occupational Therapy EVALUATION    Patient: Octavio Lubin (61 y.o. female)  Date: 3/7/2018  Primary Diagnosis: CVA (cerebral vascular accident) [facility])        Precautions: Fall, bedrest  PLOF: Pt was independent with basic self care tasks and RW for functional mobility PTA. ASSESSMENT :  Based on the objective data described below, the patient presents with impairments with regard to activity tolerance, BUE function and independence in ADLs due to CVA. Pt seen at bed level due to active bedrest orders. Pt presents w/ flat affect, following all commands with repeat questions & increased time. Full AROM, generally decreased strength of BUEs. Sensation intact. LUE ataxia as evident during functional reaching & retrieving tasks. Min A to set-up breakfast; SBA for food to mouth due to very slow movements; multiple attempts at time to successfully bring food to mouth with RUE. Pt educated on role of OT/POC; she verbalized understanding. Left supine with HOB elevated, needs within reach. Jojo Ruvalcaba RN aware. Patient will benefit from skilled intervention to address the above impairments.   Patients rehabilitation potential is considered to be Good  Factors which may influence rehabilitation potential include:   []             None noted  []             Mental ability/status  [x]             Medical condition  []             Home/family situation and support systems  []             Safety awareness  []             Pain tolerance/management  []             Other:        PLAN :  Recommendations and Planned Interventions:  [x]               Self Care Training                  [x]        Therapeutic Activities  [x]               Functional Mobility Training    []        Cognitive Retraining  [x]               Therapeutic Exercises           [x]        Endurance Activities  [x]               Balance Training                   []        Neuromuscular Re-Education  []               Visual/Perceptual Training     [x]   Home Safety Training  [x]               Patient Education                 [x]        Family Training/Education  []               Other (comment):    Frequency/Duration: Patient will be followed by occupational therapy 4-7 times a week to address goals. Discharge Recommendations: [place] vs. TBD with OOB activity  Further Equipment Recommendations for Discharge: TBD with OOB activity     SUBJECTIVE:   Patient stated No pain.     OBJECTIVE DATA SUMMARY:     Past Medical History:   Diagnosis Date    Ankle fracture, right     [facility] [address]     Hypertension     Lymphoma in remission ([facility] [address] [facility]     2 spots in brain    Psychiatric disorder     Depression     Past Surgical History:   Procedure Laterality Date    HX HYSTERECTOMY       Barriers to Learning/Limitations: yes; Slow to process information  Compensate with: visual, verbal, tactile, kinesthetic cues/model    GCODES:  Self Care  Current  CJ= 20-39%   Goal  CI= 1-19%.   The severity rating is based on the Other Functinoal Assessment, MMT, ROM    Eval Complexity: History: MEDIUM Complexity : Expanded review of history including physical, cognitive and psychosocial  history ; Examination: MEDIUM Complexity : 3-5 performance deficits relating to physical, cognitive , or psychosocial skils that result in activity limitations and / or participation restrictions; Decision Making:MEDIUM Complexity : Patient may present with comorbidities that affect occupational performnce. Miniml to moderate modification of tasks or assistance (eg, physical or verbal ) with assesment(s) is necessary to enable patient to complete evaluation     Prior Level of Function/Home Situation: Pt was independent with basic self care tasks and RW for functional mobility PTA. Home Situation  Home Environment: Private residence  # Steps to Enter: 3  One/Two Story Residence: One story  Living Alone: No  Support Systems: Family member(s)  Patient Expects to be Discharged to[de-identified] Unknown  Current DME Used/Available at Home: Walker, rolling  Tub or Shower Type: Shower (no grab bars or shower chair)  [x]  Right hand dominant   []  Left hand dominant    Cognitive/Behavioral Status:  Neurologic State: Alert  Orientation Level: Oriented X4  Cognition: Follows commands (follows commands with increased time)  Safety/Judgement: Awareness of environment; Fall prevention     Skin: Intact (BUEs)  Edema: None noted (BUEs)    Vision/Perceptual:    Acuity: Able to read clock/calendar on wall without difficulty      Coordination:  Coordination: Generally decreased, functional (RUE WFL; LUE ataxic movements)  Fine Motor Skills-Upper: Right Intact; Left Intact    Gross Motor Skills-Upper: Right Intact; Left Intact     Balance:  Sitting:  (not assessed; seen at bed level due to active bedrest orders)  Standing:  (not assessed d/t bedrest orders)     Strength:  Strength: Generally decreased, functional (BUEs approx 4/5)    Tone & Sensation:  Sensation: Intact (BUEs)    Range of Motion:  AROM: Within functional limits (BUEs: full shoulder/elbow flex)    Functional Mobility and Transfers for ADLs:  Bed Mobility:  Rolling:  (not assessed)  Supine to Sit:  (not assessed)    Transfers:  Sit to Stand:  (not assessed)    ADL Assessment:  Feeding: Setup;Minimum assistance  Oral Facial Hygiene/Grooming: Setup;Stand-by assistance  Bathing: Moderate assistance  Upper Body Dressing: Setup;Stand-by assistance  Lower Body Dressing: Moderate assistance  Toileting: Maximum assistance (purewick)    Cognitive Retraining  Safety/Judgement: Awareness of environment; Fall prevention    Pain:  Pre treatment pain level: 0/10  Post treatment pain level: 0/10  Pain Scale 1: Numeric (0 - 10)  Pain Intensity 1: 0    Activity Tolerance:    Please refer to the flowsheet for vital signs taken during this treatment. After treatment:   [] Patient left in no apparent distress sitting up in chair  [x] Patient left in no apparent distress in bed  [x] Call bell left within reach  [x] Nursing notified  [] Caregiver present  [] Bed alarm activated    COMMUNICATION/EDUCATION: Pt educated on role of OT and POC; she verbalized understanding; needs reinforcement. [x] Home safety education was provided and the patient/caregiver indicated understanding. [x] Patient/family have participated as able in goal setting and plan of care. [x] Patient/family agree to work toward stated goals and plan of care. [] Patient understands intent and goals of therapy, but is neutral about his/her participation. [] Patient is unable to participate in goal setting and plan of care.     Thank you for this referral.    Rocio Meadows MS OTR/L  Time Calculation: 15 mins

## 2018-03-07 NOTE — PROGRESS NOTES
Physical Exam   Skin:           Primary Nurse Hien Arzate and  Mel Wolfe RN performed a dual skin assessment on this patient Impairment noted- see wound doc flow sheet  Dion score is 16

## 2018-03-07 NOTE — DIABETES MGMT
NUTRITIONAL ASSESSMENT GLYCEMIC CONTROL/ PLAN OF CARE     Erlinda Gonzalez           75 y.o.           3/6/2018                 1. Non-intractable vomiting with nausea, unspecified vomiting type    2. Intraparenchymal hematoma of brain, left, without loss of consciousness, initial encounter [facility])         INTERVENTIONS/PLAN:   Monitor po intake, labs and weights. ASSESSMENT:   Nutritional Status:  Pt is 179% ideal wt;  Pt appears well nourished and po intake is adequate at this time. Documented weights indicate pt lost weight (28 lbs) since January 2016 but her weight has remained relatively stable at about 200 lbs since at least July 2017. Nutrition Diagnoses:   Obesity due to excess energy intake as evidenced by BMI of 36.0 kg/m2. Diabetes Management:   Dtr states they check pt's BG TID and it recently has been in the low 100's. Dtr related pt was once taking sliding scale insulin when BG was in the 200's but not recently used insulin as her BG readings have been in target range. Pt was seen as outpatient by CDE on 2-19-16. Current BG is well controlled. Recent blood glucose:   3/7:  113  3/6:  132, 122    Within target range (non-ICU: <140; ICU<180): [x] Yes   []  No    Current Insulin regimen:   Corrective lispro normal insulin sensitivity ACHS  Home medication/insulin regimen: per dtr  Inovokana 100 mg/d  HbA1c:  5.7% - ave BG has marilou ~ 111 mg/dL over past 3 months  Adequate glycemic control PTA:  [x] Yes  [] No       SUBJECTIVE/OBJECTIVE:   Information obtained from: chart review, pt's daughter (pt with dementia and did not participate w/ answering questions). Per dtr, pt ate all of her breakfast this morning. Dt reports pt's appetite was good at home PTA. Pt had lost weight in remote past but her weight has recently been stable. No food allergies. Dtr relates pt with occasional problems with swallowing where she coughs and cited the incident when pt had vomiting just PTA.     Pt with history of dementia,  T2DM, hypertension, large-cell lymphoma presenting as a left frontal mass in 2012 (treated by Dr. uDke Calloway with chemotherapy and XRT),  seizure disorder admitted after having N/V and found to have a left frontal hemorrhage. Diet: consistent CHO    No data found.         Medications: [x]                Reviewed   Pertinent:  Folvite, thiamine  IVF:  D5 NS at 67.1 ml/hr (providing 274 calories/day)    Most Recent POC Glucose:   Recent Labs      03/07/18   0400  03/06/18   1820  03/06/18   1030   GLU  99  117*  148*         Labs:   Lab Results   Component Value Date/Time    Hemoglobin A1c 5.7 (H) 03/06/2018 10:30 AM     Lab Results   Component Value Date/Time    Sodium 142 03/07/2018 04:00 AM    Potassium 4.6 03/07/2018 04:00 AM    Chloride 110 (H) 03/07/2018 04:00 AM    CO2 23 03/07/2018 04:00 AM    Anion gap 9 03/07/2018 04:00 AM    Glucose 99 03/07/2018 04:00 AM    BUN 18 03/07/2018 04:00 AM    Creatinine 0.65 03/07/2018 04:00 AM    Calcium 8.6 03/07/2018 04:00 AM    Magnesium 2.0 03/07/2018 04:00 AM    Phosphorus 3.1 03/07/2018 04:00 AM    Albumin 3.3 (L) 03/07/2018 04:00 AM       Anthropometrics: IBW : 49.9 kg (110 lb), % IBW (Calculated): 179.09 %, BMI (calculated): 36  Wt Readings from Last 1 Encounters:   03/06/18 89.4 kg (197 lb)      Ht Readings from Last 1 Encounters:   03/06/18 5' 2\" (1.575 m)     Last Weight Metrics:  Weight Loss Metrics 3/6/2018 3/6/2018 2/26/2018 1/12/2018 10/13/2017 8/19/2017 7/21/2017   Today's Wt 197 lb 194 lb 194 lb 9.6 oz 198 lb 203 lb 200 lb 208 lb 9.6 oz   BMI 36.03 kg/m2 37.89 kg/m2 38.01 kg/m2 38.67 kg/m2 39.65 kg/m2 39.06 kg/m2 38.15 kg/m2     1/21/16 weight - 102.3 kg    Estimated Nutrition Needs:  1520 Kcals/day, Protein (g): 60 g Fluid (ml): 1700 ml  Based on:   [x]          Actual BW    []          ABW   []            Adjusted BW      Nutrition Interventions:  None at this time  Goal:   Blood glucose will be within target range of  mg/dL by 3/10/18. Pt will continue to consume >755 meals by 3/12/18. Weight maintenance (+/- 1-2 kg) by 3/17/18.         Nutrition Monitoring and Evaluation      [x]     Monitor po intake on meal rounds  [x]     Continue inpatient monitoring and intervention  []     Other:      Nutrition Risk:  []   High     [x]  Moderate    []  Minimal/Uncompromised    Gerldine Sandhoff, RD, CDE   Office:  [address] [place] [address] Pager:  [phone]

## 2018-03-07 NOTE — PROGRESS NOTES
Problem: Dysphagia (Adult)  Goal: *Acute Goals and Plan of Care (Insert Text)  Recommendations:  Diet: regular/thin  Meds: per pt preference  Aspiration Precautions  Oral Care TID    Goals:  Patient will:  1. Tolerate PO trials with 0 s/s overt distress in 4/5 trials - goal met  2. Utilize compensatory swallow strategies/maneuvers (decrease bite/sip, size/rate, alt. liq/sol) with min cues in 4/5 trials - goal met   Outcome: Resolved/Met Date Met: 03/07/18  Speech language pathology dysphagia treatment & discharge    Patient: Valorie Segura (16 y.o. female)  Date: 3/7/2018  Diagnosis: CVA (cerebral vascular accident) ([facility] [address] <principal problem not specified>       Precautions: falls    PLOF: lives with family     ASSESSMENT:  Follow up this am with family at bedside. Regular solid/thin liquid therapeutic snack. Pt able to feed self. Exhibited adequate bolus cohesion, manipulation and A-P transit. Further exhibited adequate swallow timing/reflex and hyolaryngeal excursion. Able to manipulate and clear with 0 clinical s/s aspiration and/or oropharyngeal dysphagia. Pt safe for regular solid, thin liquid diet. Maximum therapeutic gains met; safest, least restrictive diet achieved in current in-patient/acute setting. Accordingly, SLP to d/c intervention for dysphagia at this time. Progression toward goals:  [x]         Improving appropriately - goals met/approximated  []         Not making progress/Not appropriate - will d/c POC     PLAN:  Recommendations and Planned Interventions:  Maximum therapeutic gains met; safest, least restrictive diet achieved. D/C ST intervention at this time. Discharge Recommendations:  Skilled Nursing Facility     SUBJECTIVE:   Patient stated Abdoulaye welsh.     OBJECTIVE:   Cognitive and Communication Status:  Neurologic State: Alert  Orientation Level: Oriented X4  Cognition: Follows commands (follows commands with increased time)  Perception: Appears intact  Perseveration: No perseveration noted  Safety/Judgement: Awareness of environment, Fall prevention  Dysphagia Treatment:  Oral Assessment:  Oral Assessment  Labial: No impairment  Dentition: Natural  Oral Hygiene: Good  Lingual: No impairment  Velum: No impairment  Mandible: No impairment  P.O. Trials:   Patient Position: HOB 60   Vocal quality prior to P.O.: Low volume   Consistency Presented: Thin liquid, Solid   How Presented: Self-fed/presented       Bolus Acceptance: No impairment   Bolus Formation/Control: No impairment       Propulsion: No impairment   Oral Residue: None   Initiation of Swallow: No impairment   Laryngeal Elevation: Functional   Aspiration Signs/Symptoms: None   Pharyngeal Phase Characteristics: No impairment, issues, or problems    Effective Modifications: Small sips and bites   Cues for Modifications: None         Oral Phase Severity: No impairment   Pharyngeal Phase Severity : No impairment     PAIN:  Start of Tx: 0  End of Tx: 0     GCODESwallowing:  Swallow Goal Status CH= 0%   Swallow D/C Status CH= 0%    The severity rating is based on the following outcomes:  JUAN Noms Swallow Level 7    Clinical Judgement    After treatment:   []              Patient left in no apparent distress sitting up in chair  [x]              Patient left in no apparent distress in bed  [x]              Call bell left within reach  [x]              Nursing notified  []              Caregiver present  []              Bed alarm activated      COMMUNICATION/EDUCATION:   [x] Aspiration precautions; swallow safety; compensatory techniques  []        Patient unable to participate in education; education ongoing with staff  []  Posted safety precautions in patient's room.   [] Oral-motor/laryngeal strengthening exercises    PEÑA Weaver  Time Calculation: 15 mins

## 2018-03-07 NOTE — PROGRESS NOTES
[facility]   Discharge Planning/ Assessment    Reasons for Intervention: Chart reviewed and I spoke to pt. She seemed a little confused and lópez gave me permission to speak with her Daughter and Benjamin Brush [phone] cell, [phone] home. Patient has [employer] and has a basic medicaid without a policy number that only pays for her medicare premiums. [employer] did a medicaid evaluation 3 months ago and Monday came back out to do an assessment to get nursing care added to her basic medicaid. Dtr says tracking number was [id number]. Her [employer] Medicare does not pay for LTC, they would pay for Hospice at home or snf, but would not pay for room and board for snf or care aides for home. Ms Gunjan Lucero says there is no one that can stay home and oversee her care. She says a letter should be available next week to take to Medicaid from [employer] to initiate her full medicaid. I discussed that patient might be discharged prior to then and might need to be private pay or retrospective billing. Unless patient was able to participate in therapy, then plain snf might be available. I emailed Kingsley Bell to see where pt is in this complicated process. Transition plan is snf, maybe with hospice. Daughter does not yet have a preference to snf or LTC. She will sumaya to get list of providers.     High Risk Criteria  [x] Yes  []No   Physician Referral  [] Yes  []No        Date    Nursing Referral  [] Yes  []No        Date    Patient/Family Request  [] Yes  []No        Date       Resources:    Medicare  [x] Yes  []No   Medicaid  [] Yes  []No   No Resources  [] Yes  []No   Private Insurance  [] Yes  []No    Name/Phone Number    Other  [x] Yes  []No        (i.e. Workman's Comp)         Prior Services:    Prior Services  [] Yes  [x]No   Home Health  [] Yes  []No   [address]  [] Yes  []No        [facility] of [address] Program  [] Yes  []No       Meals on Wheels  [] Yes  []No Office on Aging  [] Yes  []No   Transportation Services  [] Yes  []No   Nursing Home  [] Yes  []No        Nursing Home Name    [address]  [] Yes  []No        [address] Name    Other       Information Source:      Information obtained from  [x] Patient  [] Parent   [] Guardian  [x] Child  [] Spouse   [] Significant Other/Partner   [] Friend      [] EMS    [] Nursing Home Chart          [] Other:   Chart Review  [x] Yes  []No     Family/Support System:    Patient lives with  [x] Alone    [] Spouse   [] Significant Other  [] Children  [] Caretaker   [] Parent  [] Sibling     [] Other       Other Support System:    Is the patient responsible for care of others  [] Yes  [x]No   Information of person caring for patient on  discharge    Managers financial affairs independently  [] Yes  [x]No   If no, explain:      Status Prior to Admission:    Mental Status  [x] Awake  [] Alert  [] Oriented  [] Quiet/Calm [] Lethargic/Sedated   [] Disoriented  [] Restless/Anxious  [] Combative   Personal Care  [] Dependent  [x] [address]  [] Requires Assistance   Meal Preparation Ability  [x] Independent   [] Standby Assistance   [] Minimal Assistance   [] Moderate Assistance  [] Maximum Assistance     [] Total Assistance   Chores  [x] Independent with Chores   [] N/A Nursing Home Resident   [] Requires Assistance   Bowel/Bladder  [x] Continent  [] Catheter  [] Incontinent  [] Ostomy Self-Care    [] Urine Diversion Self-Care  [] Maximum Assistance     [] Total Assistance   Number of Persons needed for assistance    DME at home  [] Lu Zaman  [] Marissa Zaman   [] Commode    [] Bathroom/Grab Bars  [] Hospital Bed  [] Nebulizer  [] Oxygen           [] Raised Toilet Seat  [] Shower Chair  [] Side Rails for Bed   [] Tub Transfer Bench   [] Frederic West  [] Rico Alan      [] Other:   Vendor      Treatment Presently Receiving:    Current Treatments  [] Chemotherapy  [] Dialysis  [] Insulin  [] IVAB [x] IVF [] O2  [] PCA   [] PT   [] RT   [] Tube Feedings   [] Wound Care     Psychosocial Evaluation:    Verbalized Knowledge of Disease Process  [] Patient  []Family   Coping with Disease Process  [] Patient  []Family   Requires Further Counseling Coping with Disease Process  [] Patient  []Family     Identified Projected Needs:    [address]  [] Yes  []No   Transportation  [] Yes  []No   Education  [] Yes  []No        Specific Education     Financial Counseling  [] Yes  []No   Inability to Care for Self/Will Require 24 hour care  [] Yes  []No   Pain Management  [] Yes  []No   Home Infusion Therapy  [] Yes  []No   Oxygen Therapy  [] Yes  []No   DME  [] Yes  []No   Long Term Care Placement  [x] Yes  []No   Rehab  [x] Yes  []No   Physical Therapy  [] Yes  []No   Needs Anticipated At This Time  [] Yes  []No     Intra-Hospital Referral:    [address]  [] Yes  []No     [] Yes  []No   Patient Representative  [] Yes  []No   Staff for Teaching Needs  [] Yes  []No   Specialty Teaching Needs     Diabetic Educator  [] Yes  []No   Referral for Diabetic Educator Needed  [] Yes  []No  If Yes, place order for Nutritionist or Diabetic Consult     Tentative Discharge Plan:    Home with No Services  [] Yes  [x]No   Home with Home Health Follow-up  [] Yes  []No        If Yes, specify type    Home Care Program  [] Yes  []No        If Yes, specify type    Meals on Wheels  [] Yes  []No   Office of Aging  [] Yes  []No   NHP  [] Yes  []No   Return to the Nursing Home  [] Yes  []No   Rehab Therapy  [] Yes  []No   Acute Rehab  [] Yes  []No   Subacute Rehab  [x] Yes  []No   Private Care  [] Yes  []No   Substance Abuse Referral  [] Yes  []No   Transportation  [] Yes  []No   Chore Service  [] Yes  []No   Inpatient Hospice  [] Yes  []No   OP RT  [] Yes  [] No   OP Hemo  [] Yes  [] No   OP PT  [] Yes  []No   Support Group  [] Yes  []No   Reach to Recovery  [] Yes  []No   OP Oncology Clinic  [] Yes  []No   Clinic Appointment  [] Yes  []No   DME  [] Yes  []No   Comments    Name of D/C Planner or  Given to Patient or Family Xi Joelsupa. Estefany Daryas   Phone Number         Extension [phone]   Date 03/07/18   Time    If you are discharged home, whom do you designate to participate in your discharge plan and receive any information needed?      Enter name of designee dtr        Phone # of designee         Address of designee         Updated         Patient refused to designate any           individual

## 2018-03-07 NOTE — ACP (ADVANCE CARE PLANNING)
Patient has designated ___daughter and MPOA_____________________ to participate in his/her discharge plan and to receive any needed information.      Name: Ashley Booker  Address:  Phone [phone]

## 2018-03-07 NOTE — PROGRESS NOTES
Problem: Mobility Impaired (Adult and Pediatric)  Goal: *Acute Goals and Plan of Care (Insert Text)  Physical Therapy Goals  Initiated 3/7/2018 and to be accomplished within 7 day(s)  1. Patient will move from supine to sit and sit to supine  and roll side to side in bed with maximal assistance to aid in positioning. 2.  Patient will maintain BLE ROM/strength via functional maintenance program to prepare for OOB activity. Outcome: Progressing Towards Goal  physical Therapy EVALUATION    Patient: Randy Munoz (89 y.o. female)  Date: 3/7/2018  Primary Diagnosis: CVA (cerebral vascular accident) [facility])  Precautions: Fall, Skin, Seizure (bedrest)    ASSESSMENT :  Bedrest orders; bed level PT evaluation performed. Family present. Proved education to family and patient on role of PT and plan of care. Oriented to person, place, and time. Follows commands. BLE AROM WFL on command. Strength 3+/5 BLE. Heels bilaterally intact. Prior to admission, amb with ww for past 2 weeks d/t recent falls. Prior to new onset of falls, furniture walker\" per family. Would benefit from functional maintenance program to maintain BLE ROM/strength to aid in positioning and preparation for out of bed activity. Rehab tech educated on proper technique and demonstrated understanding. Patient presents with deficits in:  Bed Mobility, Transfers, Gait, Strength, Balance, Home Exercise Program and Stairs    Patient will benefit from skilled intervention to address the above impairments.   Patients rehabilitation potential is considered to be Fair  Factors which may influence rehabilitation potential include:   []         None noted  []         Mental ability/status  [x]         Medical condition  []         Home/family situation and support systems  []         Safety awareness  []         Pain tolerance/management  []         Other:      PLAN :  Recommendations and Planned Interventions:  [x]           Bed Mobility Training [x]    Neuromuscular Re-Education  [x]           Transfer Training                   []    Orthotic/Prosthetic Training  [x]           Gait Training                          []    Modalities  [x]           Therapeutic Exercises          []    Edema Management/Control  [x]           Therapeutic Activities            [x]    Patient and Family Training/Education  []           Other (comment):    Frequency/Duration: Patient will follow 3-5x/week via rehab tech for functional maintenance program, weekly by PT for re-assessment. Discharge Recommendations: Rehab pending OOB assessment  Further Equipment Recommendations for Discharge: TBD with amb     SUBJECTIVE:   Agreeable to PT    OBJECTIVE DATA SUMMARY:     Past Medical History:   Diagnosis Date    Ankle fracture, right     Diabetes ([facility]     Hypertension     Lymphoma in remission ([facility]     2 spots in brain    Psychiatric disorder     Depression     Past Surgical History:   Procedure Laterality Date    HX HYSTERECTOMY       Barriers to Learning/Limitations: yes;  cognitive  Compensate with: visual, verbal, tactile, kinesthetic cues/model    G CODES:Mobility  Current  CM= 80-99%   Goal  CK= 40-59%.   The severity rating is based on the Other clinical judgement    Eval Complexity: History: MEDIUM  Complexity : 1-2 comorbidities / personal factors will impact the outcome/ POC Exam:MEDIUM Complexity : 3 Standardized tests and measures addressing body structure, function, activity limitation and / or participation in recreation  Presentation: MEDIUM Complexity : Evolving with changing characteristics  Clinical Decision Making:Medium Complexity clinical judgement Overall Complexity:MEDIUM    Prior Level of Function/Home Situation: Amb with walker  Home Situation  Home Environment: Private residence  # Steps to Enter: 3  One/Two Story Residence: One story  Living Alone: No  Support Systems: Family member(s)  Patient Expects to be Discharged to[de-identified] Unknown  Current DME Used/Available at Home: Walker, rolling, Shower chair, Commode, bedside  Tub or Shower Type: Shower (no grab bars or shower chair)  Critical Behavior:  Neurologic State: Alert  Orientation Level: Oriented X4  Cognition: Follows commands  Safety/Judgement: Fall prevention; Awareness of environment  Psychosocial  Patient Behaviors: Cooperative  Family  Behaviors: Calm; Cooperative; Appropriate for situation  Strength:    Strength: Generally decreased, functional (BLE 3+/5; bed level)  Manual Muscle Testing (LE)         R     L    Hip Flexion:   3+/5  3+/5    Knee EXT:   3+/5  3+/5  Knee FLEX:   3+/5  3+/5      Ankle DF:   3+/5  3+/5  _________________________________________________   Tone & Sensation:   Tone: Normal  Sensation: Intact  Range Of Motion:  AROM: Within functional limits (BLE)  Functional Mobility:  Bed Mobility:  Rolling:  (not assessed)  Supine to Sit:  (bed level)  Transfers:  Sit to Stand:  (bed level)  Balance:   Sitting:  (bed level)  Standing:  (bed level)  Ambulation/Gait Training:  Gait Description (WDL):  (bed level)  Pain:  Pre treatment pain level: 0  Post treatment pain level: 0  Pain Scale 1: Numeric (0 - 10)  Pain Intensity 1: 0  Activity Tolerance:   Fair  Please refer to the flowsheet for vital signs taken during this treatment. After treatment:   []         Patient left in no apparent distress sitting up in chair  [x]         Patient left in no apparent distress in bed  [x]         Call bell left within reach  [x]         Nursing notified  []         Caregiver present  []         Bed alarm activated    COMMUNICATION/EDUCATION:   [x]         Fall prevention education was provided and the patient/caregiver indicated understanding. [x]         Patient/family have participated as able in goal setting and plan of care. [x]         Patient/family agree to work toward stated goals and plan of care.   []         Patient understands intent and goals of therapy, but is neutral about his/her participation. []         Patient is unable to participate in goal setting and plan of care. Patient educated on the role of physical therapy during the acute stay  and the importance of mobility. ZORAIDA.       Thank you for this referral.  Akhil Reyes, PT   Time Calculation: 10 mins

## 2018-03-08 NOTE — CONSULTS
Neurology Consult  3/8/2018       Plans for hospice care noted. Will be available to see again as needed.

## 2018-03-08 NOTE — PROGRESS NOTES
Problem: Falls - Risk of  Goal: *Absence of Falls  Document Anjum Fall Risk and appropriate interventions in the flowsheet.    Outcome: Progressing Towards Goal  Fall Risk Interventions:  Mobility Interventions: Communicate number of staff needed for ambulation/transfer         Medication Interventions: Evaluate medications/consider consulting pharmacy, Patient to call before getting OOB    Elimination Interventions: Call light in reach    History of Falls Interventions: Room close to nurse's station, Door open when patient unattended

## 2018-03-08 NOTE — ROUTINE PROCESS
Bedside and Verbal shift change report given to [address] [place] [address] (oncoming nurse) by Mushtaq Zaidi RN   (offgoing nurse). Report included the following information SBAR, Kardex, Intake/Output, MAR and Recent Results.

## 2018-03-08 NOTE — PROGRESS NOTES
Progress Note      Patient: Clarisse Francisco               Sex: female          DOA: 3/6/2018       YOB: 1950      Age:  75 y.o.        LOS:  LOS: 2 days               Subjective:   Pt seen today and dcp note seen . will wait for instructions from the dcp as to when the pt can be dc'd with hospice . Objective:      Visit Vitals    /52    Pulse 67    Temp 98.4 °F (36.9 °C)    Resp 17    Ht 5' 2\" (1.575 m)    Wt 89.4 kg (197 lb)    SpO2 97%    Breastfeeding No    BMI 36.03 kg/m2       Physical Exam:  Pt is awake and denies any discomfort . she has a flat affect   Heart reg rate and rhythm   Lungs good breath soundsheard   Abdomen soft and nontender   Neuro . no focal weakness .  dementia       Lab/Data Reviewed:  BMP:   Lab Results   Component Value Date/Time     03/08/2018 04:30 AM    K 4.6 03/08/2018 02:42 PM     03/08/2018 04:30 AM    CO2 27 03/08/2018 04:30 AM    AGAP 7 03/08/2018 04:30 AM     (H) 03/08/2018 04:30 AM    BUN 14 03/08/2018 04:30 AM    CREA 0.69 03/08/2018 04:30 AM    GFRAA >60 03/08/2018 04:30 AM    GFRNA >60 03/08/2018 04:30 AM     CMP:   Lab Results   Component Value Date/Time     03/08/2018 04:30 AM    K 4.6 03/08/2018 02:42 PM     03/08/2018 04:30 AM    CO2 27 03/08/2018 04:30 AM    AGAP 7 03/08/2018 04:30 AM     (H) 03/08/2018 04:30 AM    BUN 14 03/08/2018 04:30 AM    CREA 0.69 03/08/2018 04:30 AM    GFRAA >60 03/08/2018 04:30 AM    GFRNA >60 03/08/2018 04:30 AM    CA 9.0 03/08/2018 04:30 AM    MG 2.1 03/08/2018 02:42 PM    PHOS 3.1 03/08/2018 02:42 PM    ALB 3.5 03/08/2018 04:30 AM    TP 7.1 03/08/2018 04:30 AM    GLOB 3.6 03/08/2018 04:30 AM    AGRAT 1.0 03/08/2018 04:30 AM    SGOT 7 (L) 03/08/2018 04:30 AM    ALT 15 03/08/2018 04:30 AM     CBC:   Lab Results   Component Value Date/Time    WBC 7.6 03/08/2018 04:30 AM    HGB 13.8 03/08/2018 04:30 AM    HCT 42.3 03/08/2018 04:30 AM     03/08/2018 04:30 AM Assessment/Plan     Active Problems:    CVA (cerebral vascular accident) ([facility] (3/6/2018)      Hemorrhage of brain, nontraumatic ([facility] [facility] (3/7/2018)      Dementia associated with other underlying disease (3/7/2018)      Debility (3/7/2018)  Underlying cns lymphoma       Plan: pt will be sent home with hospice when arrangements can be made

## 2018-03-08 NOTE — PROGRESS NOTES
2000 Family visiting. Pt awake and alert, follows commands, no distress noted. 2230 Sleeping, no distress noted. 0000 Zofran for nausea. Gets nauseated on movement. 0400 Bath given and repositioned, no change noted. 3535 Small amt of emesis noted.

## 2018-03-08 NOTE — PROGRESS NOTES
Patients family has declined radiation treatments. She does not have a benefit for comfort care/hospice in a facility with her [employer]. Family will need to pay room and board to a SNF until her Medicaid is approved. If they are unable to pay room and board to the SNF she will need to discharge home with hospice care. [employer] may not provide transportation home, family will need to pay out of pocket for transportation if her benefit does not cover transportation. Patients family has made application for Medicaid but it has yet to be approved. Will provide family a SNF list and explained the coverage limitations with [employer]. Palliative care is to met with family and discuss medical care options.    Mary Ellen Gaitan RN

## 2018-03-08 NOTE — PROGRESS NOTES
[facility]: [phone] ([phone])  [facility]: [address] [place] [address] Way: [phone]    Patient Name: Tony Randolph  YOB: 1950    Date of Initial Consult: 3/7/2018   Reason for Consult: care decisions  Requesting Provider: Celeste Sigala   Primary Care Physician: Haily Tolliver MD      SUMMARY:   Tony Randolph is a 75y.o. year old with a past history of diabetes, hypertension, lymphoma s/p chemo and radiation , dementia, who was admitted on 3/6/2018 from home with a diagnosis of hemorrhage with the lateral left frontal lobe with possible recurrent lymphoma . Current medical issues leading to Palliative Medicine involvement include: 75year old female with brain hemorrhage possible recurrent lymphoma and dementia. Palliative medicine is consulted to discuss care options. PALLIATIVE DIAGNOSES:   1. Advanced care plan discussions   2. Brain hemorrhage     3. Dementia     4. Debility        PLAN:   1. Ms. Brionna Perea is seen as follow up today, her daughter Lili Ding and grandson Tj Valadez later joined at the bedside. Mrs. Brionna Perea is alert and oriented, processing remains slow. Small amount of vomiting while in room, however she denied nausea. 2. Advanced care plan discussions Lili Ding ( daughter is MPOA). Goals of care DNR/DNI. DDNR has been signed. Patient and family have decided not to go forward with radiation. All aware she is eligible for hospice care. Appreciate Case Management help, working on disposition Medicaid per  still in pending stage, applied back in October. Daughter aware and will call her contact. Care decisions; hospice once discharged, no feeding tubes if that were to become an option. 3. Brain hemorrhage/  alert verbal, speech and processing slow. Some vomiting today after movement with bed reclining. Decadron started by oncology. 4. Dementia formally dx by neurology, some could be secondary to previous RT.  Family has seen a decline in function. Family also aware this a progressive terminal disease  5. Debility had several falls, uses walker for ambulation. Needs assistance with ADL's and IADL's. Incontinent of urine. No longer safe to be at home alone. Family works. Will likely need long term care with hospice. 6. Initial consult note routed to primary continuity provider  7. Communicated plan of care with: Palliative IDT, daughter, patient    GOALS OF CARE:         TREATMENT PREFERENCES:   Code Status: DNR    Advance Care Planning:  Advance Care Planning 3/6/2018   Patient's Healthcare Decision Maker is: Legal Next of Valeria 68   Primary Decision Maker Name Dayna Dietrich   Primary Decision Maker Phone Number [phone]   Confirm Advance Directive Yes, on file   Does the patient have other document types Power of            Other Instructions:   Artificially Administered Nutrition: No feeding tube     Other:  As far as possible, the palliative care team has discussed with patient / health care proxy about goals of care / treatment preferences for patient. HISTORY:     History obtained from: chart and daughter     CHIEF COMPLAINT: vomiting     HPI/SUBJECTIVE:    The patient is:   [] Verbal and participatory  [x] Non-participatory due to: slow mental processing,   75year old female presented with vomiting, found to have brain hemorrhage concern for recurrent lymphoma. Has been seen by oncology not a candidate for chemotherapy, radiation oncology has been consulted. Patient also has dementia. She is currently comfortable, not vomiting. Family at bedside, goals of care and care discussions discussed.     Clinical Pain Assessment (nonverbal scale for nonverbal patients): Clinical Pain Assessment  Severity: 0          Duration: for how long has pt been experiencing pain (e.g., 2 days, 1 month, years)  Frequency: how often pain is an issue (e.g., several times per day, once every few days, constant)     FUNCTIONAL ASSESSMENT: Palliative Performance Scale (PPS):  PPS: 30    ECOG  ECOG Status : Completely disabled     PSYCHOSOCIAL/SPIRITUAL SCREENING:      Any spiritual / Druze concerns:  [] Yes /  [x] No    Caregiver Burnout:  [] Yes /  [x] No /  [] No Caregiver Present      Anticipatory grief assessment:   [x] Normal  / [] Maladaptive        REVIEW OF SYSTEMS:     Positive and pertinent negative findings in ROS are noted above in HPI. The following systems were [] reviewed / [x] unable to be reviewed as noted in HPI  Other findings are noted below. Systems: constitutional, ears/nose/mouth/throat, respiratory, gastrointestinal, genitourinary, musculoskeletal, integumentary, neurologic, psychiatric, endocrine. Positive findings noted below. Modified ESAS Completed by: provider   Fatigue: 6 Drowsiness: 0     Pain: 0   Anxiety: 0 Nausea: 0   Anorexia: 3 Dyspnea: 0     Constipation: No              PHYSICAL EXAM:     Wt Readings from Last 3 Encounters:   03/06/18 89.4 kg (197 lb)   03/06/18 88 kg (194 lb)   02/26/18 88.3 kg (194 lb 9.6 oz)     Blood pressure (!) 135/102, pulse 63, temperature 98 °F (36.7 °C), resp. rate 14, height 5' 2\" (1.575 m), weight 89.4 kg (197 lb), SpO2 98 %, not currently breastfeeding.   Pain:  Pain Scale 1: Numeric (0 - 10)  Pain Intensity 1: 0                     Constitutional: sitting up in bed, verbal alert, mentation somewhat slow but oriented x 3   Eyes: pupils equal, anicteric  Respiratory: breathing not labored  Skin: warm, dry  Neurologic: alert oriented x 3 speech slow, processes information slow         HISTORY:     Active Problems:    CVA (cerebral vascular accident) ([address] (3/6/2018)      Hemorrhage of brain, nontraumatic ([address] (3/7/2018)      Dementia associated with other underlying disease (3/7/2018)      Debility (3/7/2018)      Past Medical History:   Diagnosis Date    Ankle fracture, right     Diabetes ([address]     Hypertension     Lymphoma in remission ([address]     2 spots in brain    Psychiatric disorder     Depression      Past Surgical History:   Procedure Laterality Date    HX HYSTERECTOMY        History reviewed. No pertinent family history. History reviewed, no pertinent family history.   Social History   Substance Use Topics    Smoking status: Former Smoker    Smokeless tobacco: Never Used    Alcohol use No     Allergies   Allergen Reactions    Latex, Natural Rubber Other (comments)     Swelling in hands and lips    Iodine Anaphylaxis and Swelling      Current Facility-Administered Medications   Medication Dose Route Frequency    dexamethasone (DECADRON) tablet 1 mg  1 mg Oral Q12H    dextrose 5% and 0.9% NaCl infusion  67 mL/hr IntraVENous CONTINUOUS    citalopram (CELEXA) tablet 20 mg  20 mg Oral DAILY    donepezil (ARICEPT) tablet 10 mg  10 mg Oral QHS    enalapril (VASOTEC) tablet 20 mg  20 mg Oral DAILY    ELECTROLYTE REPLACEMENT PROTOCOL  1 Each Other Q8H    ELECTROLYTE REPLACEMENT PROTOCOL  1 Each Other Q8H    ELECTROLYTE REPLACEMENT PROTOCOL  1 Each Other Q8H    PHARMACY INFORMATION NOTE  1 Each Other DAILY    ondansetron (ZOFRAN) injection 2 mg  2 mg IntraVENous Q6H PRN    labetalol (NORMODYNE;TRANDATE) 20 mg/4 mL (5 mg/mL) injection 5 mg  5 mg IntraVENous Q15MIN PRN    acetaminophen (TYLENOL) tablet 650 mg  650 mg Oral Q4H PRN    acetaminophen (TYLENOL) suppository 650 mg  650 mg Rectal Q4H PRN    senna-docusate (PERICOLACE) 8.6-50 mg per tablet 2 Tab  2 Tab Oral QHS    albuterol (PROVENTIL VENTOLIN) nebulizer solution 2.5 mg  2.5 mg Nebulization M5A PRN    folic acid (FOLVITE) 1 mg, thiamine (B-1) 100 mg in 0.9% sodium chloride 50 mL ivpb   IntraVENous ACD    pantoprazole (PROTONIX) tablet 40 mg  40 mg Oral Q12H    Or    pantoprazole (PROTONIX) granules for oral suspension 40 mg  40 mg Oral Q12H    Or    pantoprazole (PROTONIX) 40 mg in sodium chloride 10 mL injection  40 mg IntraVENous Q12H    insulin lispro (HUMALOG) injection   SubCUTAneous AC&HS    glucose chewable tablet 16 g  16 g Oral PRN    glucagon (GLUCAGEN) injection 1 mg  1 mg IntraMUSCular PRN    dextrose (D50W) injection syrg 12.5-25 g  25-50 mL IntraVENous PRN    niCARdipine (CARDENE) 25 mg in 0.9% sodium chloride 250 mL infusion  0-15 mg/hr IntraVENous TITRATE        LAB AND IMAGING FINDINGS:     Lab Results   Component Value Date/Time    WBC 7.6 03/08/2018 04:30 AM    HGB 13.8 03/08/2018 04:30 AM    PLATELET 091 49/83/6815 04:30 AM     Lab Results   Component Value Date/Time    Sodium 141 03/08/2018 04:30 AM    Potassium 4.0 03/08/2018 04:30 AM    Chloride 107 03/08/2018 04:30 AM    CO2 27 03/08/2018 04:30 AM    BUN 14 03/08/2018 04:30 AM    Creatinine 0.69 03/08/2018 04:30 AM    Calcium 9.0 03/08/2018 04:30 AM    Magnesium 2.2 03/08/2018 04:30 AM    Phosphorus 3.4 03/08/2018 04:30 AM      Lab Results   Component Value Date/Time    AST (SGOT) 7 (L) 03/08/2018 04:30 AM    Alk. phosphatase 70 03/08/2018 04:30 AM    Protein, total 7.1 03/08/2018 04:30 AM    Albumin 3.5 03/08/2018 04:30 AM    Globulin 3.6 03/08/2018 04:30 AM     Lab Results   Component Value Date/Time    INR 1.0 03/07/2018 05:18 PM    Prothrombin time 12.7 03/07/2018 05:18 PM    aPTT 28.1 03/07/2018 05:18 PM      No results found for: IRON, FE, TIBC, IBCT, PSAT, FERR   No results found for: PH, PCO2, PO2  No components found for: [place]   Lab Results   Component Value Date/Time    CK 53 03/06/2018 06:20 PM    CK - MB 1.2 03/06/2018 06:20 PM              Total time: 35 minutes  Counseling / coordination time, spent as noted above: 25 minutes  > 50% counseling / coordination: yes with daughter    Prolonged service was provided for  []30 min   []75 min in face to face time in the presence of the patient, spent as noted above. Time Start:   Time End:   Note: this can only be billed with 89290 (initial) or 60904 (follow up). If multiple start / stop times, list each separately.

## 2018-03-08 NOTE — PROGRESS NOTES
0700 Bedside and Verbal shift change report received from [facility] . Report included the following information SBAR, Kardex, ED Summary, Intake/Output, MAR and Recent Results. Pt was alert and able to make her needs known. Dual NIH  Done. Pt denied any pain. Pt. On room air. Sinus bradycardia on monitor, SCDS in place. 0947 Pt. Given PRN zofran for nausea. 1030 Family at bedside. Palliative care at bedside  Pt has required Zofran twice this shift. She  gets nausea and vomiting with movement. Dr. Madeleine Poon was huerta aware. 1900 Bedside and Verbal shift change report given to Brittany Kirkland RN  (oncoming nurse) by Maricruz Post   (offgoing nurse). Report included the following information SBAR, Kardex, ED Summary, Procedure Summary, Intake/Output, Recent Results and Med Rec Status.

## 2018-03-09 NOTE — PROGRESS NOTES
Problem: Falls - Risk of  Goal: *Absence of Falls  Document Anjum Fall Risk and appropriate interventions in the flowsheet.    Outcome: Progressing Towards Goal  Fall Risk Interventions:  Mobility Interventions: Bed/chair exit alarm, Patient to call before getting OOB         Medication Interventions: Bed/chair exit alarm, Patient to call before getting OOB    Elimination Interventions: Bed/chair exit alarm, Call light in reach, Patient to call for help with toileting needs, Toileting schedule/hourly rounds    History of Falls Interventions: Door open when patient unattended, Bed/chair exit alarm, Room close to nurse's station

## 2018-03-09 NOTE — PROGRESS NOTES
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     Disposition  - Will need SNF with hospice services.     Available as further questions arise      Interval  Transferred to regular room  On dexamethasone  Emesis basin on bed  Admits to nausea  Denies abd pain          Visit Vitals    /84 (BP 1 Location: Right arm, BP Patient Position: At rest)    Pulse 62    Temp 98.1 °F (36.7 °C)    Resp 14    Ht 5' 2\" (1.575 m)    Wt 89.3 kg (196 lb 13.9 oz)    SpO2 98%    Breastfeeding No    BMI 36.01 kg/m2     Alert, NAD  Slowed responses, poor eye contact  PERRL, nonicteric  OP clear  CTAB ant  RRR  BS+, no grimace to palpation  No BLE edema  No rash    Recent Results (from the past 12 hour(s))   GLUCOSE, POC    Collection Time: 03/09/18  8:09 AM   Result Value Ref Range    Glucose (POC) 156 (H) 70 - 327 mg/dL   METABOLIC PANEL, BASIC    Collection Time: 03/09/18 10:25 AM   Result Value Ref Range    Sodium 140 136 - 145 mmol/L    Potassium 3.8 3.5 - 5.5 mmol/L    Chloride 106 100 - 108 mmol/L    CO2 28 21 - 32 mmol/L    Anion gap 6 3.0 - 18 mmol/L    Glucose 138 (H) 74 - 99 mg/dL    BUN 12 7.0 - 18 MG/DL    Creatinine 0.67 0.6 - 1.3 MG/DL    BUN/Creatinine ratio 18 12 - 20      GFR est AA >60 >60 ml/min/1.73m2    GFR est non-AA >60 >60 ml/min/1.73m2    Calcium 9.0 8.5 - 10.1 MG/DL   CBC WITH AUTOMATED DIFF    Collection Time: 03/09/18 10:25 AM   Result Value Ref Range    WBC 9.0 4.6 - 13.2 K/uL    RBC 4.51 4.20 - 5.30 M/uL    HGB 13.1 12.0 - 16.0 g/dL    HCT 39.8 35.0 - 45.0 %    MCV 88.2 74.0 - 97.0 FL    MCH 29.0 24.0 - 34.0 PG    MCHC 32.9 31.0 - 37.0 g/dL    RDW 12.9 11.6 - 14.5 %    PLATELET 187 548 - 021 K/uL    MPV 10.0 9.2 - 11.8 FL    NEUTROPHILS 80 (H) 40 - 73 %    LYMPHOCYTES 14 (L) 21 - 52 %    MONOCYTES 6 3 - 10 %    EOSINOPHILS 0 0 - 5 %    BASOPHILS 0 0 - 2 %    ABS. NEUTROPHILS 7.2 1.8 - 8.0 K/UL    ABS. LYMPHOCYTES 1.2 0.9 - 3.6 K/UL    ABS. MONOCYTES 0.5 0.05 - 1.2 K/UL    ABS. EOSINOPHILS 0.0 0.0 - 0.4 K/UL    ABS.  BASOPHILS 0.0 0.0 - 0.06 K/UL    DF AUTOMATED     GLUCOSE, POC    Collection Time: 03/09/18 11:07 AM   Result Value Ref Range    Glucose (POC) 156 (H) 70 - 110 mg/dL   GLUCOSE, POC    Collection Time: 03/09/18 12:22 PM   Result Value Ref Range    Glucose (POC) 191 (H) 70 - 110 mg/dL       S[place] [facility] [phone]  Cell [phone]

## 2018-03-09 NOTE — PROGRESS NOTES
Patients daughter Ms Miguel Terrell left message for me. She says that Shaila Casey [phone] had the assessment and the recommendation for long term facility care for medicaid. And  we could call Ms Cathy Rivers to see where we are. I had previously emailed to Sandra Reese to see if she knew where we are with medicaid. She has a facility for long term care picked out [address] [place] [address] and daughter spoke to Rudy there. She would agreee for information to be shared with them. Posted to [address] [place] [address] in [place]. Nidhi Jurado.  [address] Management  [phone], beeper [phone]

## 2018-03-09 NOTE — PROGRESS NOTES
Problem: Falls - Risk of  Goal: *Absence of Falls  Document Anjum Fall Risk and appropriate interventions in the flowsheet.    Outcome: Progressing Towards Goal  Fall Risk Interventions:  Mobility Interventions: Bed/chair exit alarm         Medication Interventions: Bed/chair exit alarm, Evaluate medications/consider consulting pharmacy    Elimination Interventions: Bed/chair exit alarm, Call light in reach, Patient to call for help with toileting needs    History of Falls Interventions: Bed/chair exit alarm, Door open when patient unattended

## 2018-03-09 NOTE — PROGRESS NOTES
Physical Exam   Skin:           Primary Nurse Caprice Najera and Mellisa Gonzalez, RN performed a dual skin assessment on this patient No impairment noted  Dion score is 16

## 2018-03-09 NOTE — PROGRESS NOTES
I have contacted Matthias Rehman [phone]  From Dept of [facility] and she states she has Mrs Martha Herron UAI and will fax it to me at [phone]. I faxed UAI to Ms Aaron High at [facility] [phone], and I tried to call Ms Lorene Schwarz, but no answer and her mailbox is full. I called back to her supervisor, Mr Elbert Dhaliwal [phone] and I got no answer, left a voice mail. I also called [phone] as directed by the supervisor's phone answering message and left  Message with that  also. I was able to get [employer] OK for faxing to Ms Lorene Schwarz. 1411: I called and spoke to Omid Titus from [facility] and I faxed her copy of the UAI. She said the plan was to try to admit pt under [facility] with PT/OT notes first. Then, when they got the Medicaid number, could switch it over to [facility]. That would require the therapy notes to be placed and then auth started with [facility], which would not occur till next week. If able to obtain a medicaid number today, then possibly able to go there sooner. I called Ms Aaron High number again, ( [facility]) but still no answer. 1447: Mr Elbert Dhaliwal called back and said they received the UAI and will have a determination by end of next week if family is able to update resource information. I called  Alex Benjamin and she says she has been working with Mr Elbert Dhaliwal and has a few of her mom's things to update and find out. She will be in [facility] on Monday, but will be able to accept voice mails.

## 2018-03-09 NOTE — PROGRESS NOTES
Physical Exam   Skin:           Primary Nurse Gildardo Ovalle and Eugenio Gonzales, RN performed a dual skin assessment on this patient No impairment noted  Dion score is 16

## 2018-03-09 NOTE — PROGRESS NOTES
[facility]: [phone]  [facility]: [address] [place] [address] Way: [phone]    Patient Name: Randy Munoz  YOB: 1950    Date of Initial Consult: 3/7/2018   Reason for Consult: care decisions  Requesting Provider: Jame Katz   Primary Care Physician: Nela Talley MD      SUMMARY:   Randy Munoz is a 75y.o. year old with a past history of diabetes, hypertension, lymphoma s/p chemo and radiation , dementia, who was admitted on 3/6/2018 from home with a diagnosis of hemorrhage with the lateral left frontal lobe with possible recurrent lymphoma . Current medical issues leading to Palliative Medicine involvement include: 75year old female with brain hemorrhage possible recurrent lymphoma and dementia. Palliative medicine is consulted to discuss care options. PALLIATIVE DIAGNOSES:   1. Advanced care plan discussions   2. Brain hemorrhage     3. Dementia     4. Debility        PLAN:   1. Follow up with Ms. Leesa Smith today, has moved out of ICU. Alert oriented x 2, fatigues easily with conversation. Has difficultly following in depth conversation. Daughter Warren Palacio later joined at bedside. 2. Advanced care plan discussions Warren Palacio ( daughter is MPOA). Goals of care DNR/DNI. DDNR has been signed. Has moved out of ICU. Daughter understands disease well. Does not wish for escalation of care or return to ICU, no pressor support. Appreciate therapy support, daughter wishes for her to go to nursing facility for therapy to achieve best quality of life once this complete she will move to LTC with hospice care. POST completed today, DNR/DNI, comfort care at d/c along with therapy for as long as beneficial, no feeding tubes. Hospice referral not placed as plan is skilled care for now. 3. Brain hemorrhage/ possible recurrent lymphoma  alert verbal, speech and processing slow. C/o of nausea today, received Zofran, on Decadron.  Seen by oncology not a candidate for chemo, patient and family have elected not to pursue RT. 4. Dementia formally dx by neurology, some could be secondary to previous RT. Family has seen a decline in function. Family also aware this a progressive terminal disease  5. Debility had several falls, uses walker for ambulation. Needs assistance with ADL's and IADL's. Incontinent of urine. No longer safe to be at home alone. Family works. Participating in therapy and will go to nursing facility with therapy, once complete long term care with hospice support. 6. Initial consult note routed to primary continuity provider  7. Communicated plan of care with: Palliative IDT, daughter, patient    GOALS OF CARE:  Patient/Health Care Proxy Stated Goals: Comfort (comfort on discharge)      TREATMENT PREFERENCES:   Code Status: DNR    Advance Care Planning:  Advance Care Planning 3/6/2018   Patient's Healthcare Decision Maker is: Legal Next david Shaw 68   Primary Decision Maker Name Nata Lopez   Primary Decision Maker Phone Number [phone]   Confirm Advance Directive Yes, on file   Does the patient have other document types Power of            Other Instructions:   Artificially Administered Nutrition: No feeding tube     Other:  As far as possible, the palliative care team has discussed with patient / health care proxy about goals of care / treatment preferences for patient. HISTORY:     History obtained from: chart and daughter     CHIEF COMPLAINT: vomiting     HPI/SUBJECTIVE:    The patient is:   [] Verbal and participatory  [x] Non-participatory due to: slow mental processing,   75year old female presented with vomiting, found to have brain hemorrhage concern for recurrent lymphoma. Has been seen by oncology not a candidate for chemotherapy, radiation oncology has been consulted. Patient also has dementia. She is currently comfortable, not vomiting.  Family at bedside, goals of care and care discussions discussed. Clinical Pain Assessment (nonverbal scale for nonverbal patients): Clinical Pain Assessment  Severity: 1  Location: abdomen  Duration: several days  Effect: minimal discomfort   Frequency: occasionally          Duration: for how long has pt been experiencing pain (e.g., 2 days, 1 month, years)  Frequency: how often pain is an issue (e.g., several times per day, once every few days, constant)     FUNCTIONAL ASSESSMENT:     Palliative Performance Scale (PPS):  PPS: 30    ECOG  ECOG Status : Completely disabled     PSYCHOSOCIAL/SPIRITUAL SCREENING:      Any spiritual / Taoist concerns:  [] Yes /  [x] No    Caregiver Burnout:  [] Yes /  [x] No /  [] No Caregiver Present      Anticipatory grief assessment:   [x] Normal  / [] Maladaptive        REVIEW OF SYSTEMS:     Positive and pertinent negative findings in ROS are noted above in HPI. The following systems were [] reviewed / [x] unable to be reviewed as noted in HPI  Other findings are noted below. Systems: constitutional, ears/nose/mouth/throat, respiratory, gastrointestinal, genitourinary, musculoskeletal, integumentary, neurologic, psychiatric, endocrine. Positive findings noted below. Modified ESAS Completed by: provider   Fatigue: 5 Drowsiness: 0     Pain: 1   Anxiety: 0 Nausea: 3   Anorexia: 3 Dyspnea: 0     Constipation: No              PHYSICAL EXAM:     Wt Readings from Last 3 Encounters:   03/09/18 89.3 kg (196 lb 13.9 oz)   03/06/18 88 kg (194 lb)   02/26/18 88.3 kg (194 lb 9.6 oz)     Blood pressure 170/84, pulse 62, temperature 98.1 °F (36.7 °C), resp. rate 14, height 5' 2\" (1.575 m), weight 89.3 kg (196 lb 13.9 oz), SpO2 98 %, not currently breastfeeding.   Pain:  Pain Scale 1: Numeric (0 - 10)  Pain Intensity 1: 0                     Constitutional: sitting up in bed, alert, voice soft slow but oriented x 3, in NAD  Eyes: pupils equal, anicteric  Respiratory: breathing not labored  Skin: warm, dry  Neurologic: alert oriented x 2 speech slow, processes information slow         HISTORY:     Active Problems:    CVA (cerebral vascular accident) ([facility] (3/6/2018)      Hemorrhage of brain, nontraumatic ([facility] (3/7/2018)      Dementia associated with other underlying disease (3/7/2018)      Debility (3/7/2018)      Past Medical History:   Diagnosis Date    Ankle fracture, right     Diabetes ([facility]     Hypertension     Lymphoma in remission ([facility]     2 spots in brain    Psychiatric disorder     Depression      Past Surgical History:   Procedure Laterality Date    HX HYSTERECTOMY        History reviewed. No pertinent family history. History reviewed, no pertinent family history.   Social History   Substance Use Topics    Smoking status: Former Smoker    Smokeless tobacco: Never Used    Alcohol use No     Allergies   Allergen Reactions    Latex, Natural Rubber Other (comments)     Swelling in hands and lips    Iodine Anaphylaxis and Swelling      Current Facility-Administered Medications   Medication Dose Route Frequency    dexamethasone (DECADRON) tablet 1 mg  1 mg Oral Q12H    dextrose 5% and 0.9% NaCl infusion  67 mL/hr IntraVENous CONTINUOUS    citalopram (CELEXA) tablet 20 mg  20 mg Oral DAILY    donepezil (ARICEPT) tablet 10 mg  10 mg Oral QHS    enalapril (VASOTEC) tablet 20 mg  20 mg Oral DAILY    PHARMACY INFORMATION NOTE  1 Each Other DAILY    ondansetron (ZOFRAN) injection 2 mg  2 mg IntraVENous Q6H PRN    labetalol (NORMODYNE;TRANDATE) 20 mg/4 mL (5 mg/mL) injection 5 mg  5 mg IntraVENous Q15MIN PRN    acetaminophen (TYLENOL) tablet 650 mg  650 mg Oral Q4H PRN    acetaminophen (TYLENOL) suppository 650 mg  650 mg Rectal Q4H PRN    senna-docusate (PERICOLACE) 8.6-50 mg per tablet 2 Tab  2 Tab Oral QHS    albuterol (PROVENTIL VENTOLIN) nebulizer solution 2.5 mg  2.5 mg Nebulization S1A PRN    folic acid (FOLVITE) 1 mg, thiamine (B-1) 100 mg in 0.9% sodium chloride 50 mL ivpb   IntraVENous ACD    pantoprazole (PROTONIX) tablet 40 mg  40 mg Oral Q12H    Or    pantoprazole (PROTONIX) granules for oral suspension 40 mg  40 mg Oral Q12H    Or    pantoprazole (PROTONIX) 40 mg in sodium chloride 10 mL injection  40 mg IntraVENous Q12H    insulin lispro (HUMALOG) injection   SubCUTAneous AC&HS    glucose chewable tablet 16 g  16 g Oral PRN    glucagon (GLUCAGEN) injection 1 mg  1 mg IntraMUSCular PRN    dextrose (D50W) injection syrg 12.5-25 g  25-50 mL IntraVENous PRN        LAB AND IMAGING FINDINGS:     Lab Results   Component Value Date/Time    WBC 9.0 03/09/2018 10:25 AM    HGB 13.1 03/09/2018 10:25 AM    PLATELET 508 79/27/9011 10:25 AM     Lab Results   Component Value Date/Time    Sodium 140 03/09/2018 10:25 AM    Potassium 3.8 03/09/2018 10:25 AM    Chloride 106 03/09/2018 10:25 AM    CO2 28 03/09/2018 10:25 AM    BUN 12 03/09/2018 10:25 AM    Creatinine 0.67 03/09/2018 10:25 AM    Calcium 9.0 03/09/2018 10:25 AM    Magnesium 2.1 03/08/2018 02:42 PM    Phosphorus 3.1 03/08/2018 02:42 PM      Lab Results   Component Value Date/Time    AST (SGOT) 7 (L) 03/08/2018 04:30 AM    Alk. phosphatase 70 03/08/2018 04:30 AM    Protein, total 7.1 03/08/2018 04:30 AM    Albumin 3.5 03/08/2018 04:30 AM    Globulin 3.6 03/08/2018 04:30 AM     Lab Results   Component Value Date/Time    INR 1.1 03/08/2018 02:42 PM    Prothrombin time 13.4 03/08/2018 02:42 PM    aPTT 28.1 03/08/2018 02:42 PM      No results found for: IRON, FE, TIBC, IBCT, PSAT, FERR   No results found for: PH, PCO2, PO2  No components found for: [place]   Lab Results   Component Value Date/Time    CK 53 03/06/2018 06:20 PM    CK - MB 1.2 03/06/2018 06:20 PM              Total time: 65 minutes  Counseling / coordination time, spent as noted above: 55 minutes  > 50% counseling / coordination: yes with daughter    Prolonged service was provided for  [x]30 min   []75 min in face to face time in the presence of the patient, spent as noted above.   Time Start: 1300Time End: 39433   0900 0915 Note: this can only be billed with 59847 (initial) or 35286 (follow up). If multiple start / stop times, list each separately.

## 2018-03-09 NOTE — PROGRESS NOTES
I have spoken to Lianne Armstrong who states the patients Medicaid only cover [employer], coverage for the patients insurance premiums. I was told by June that the patient need a UAI completed which would then need to be turned over to [facility] which would then activate the patients Full Medicaid benefit. The family met with [facility], admission coordinator for [facility] yesterday and stated they would like her facility at discharge. I spoke to [facility]. I asked about the status of her acceptance. She stated the family told her that the UAI was completed and had been turned in to [facility]. She stated the family will need to decide on a Level of Care, either LTC or Hospice. She, Zen Said, also stated her facility is active with [address] and [facility]. The family will need select an agency to provide hospice services. This can occur only after the patient \" full\" Medicaid has been authorized. I spoke to Mellisa Cowart with [employer] and she stated the patients assessment, UAI, was completed March 5, 2018. When it was submitted to [facility]. I found out the patients benefits worker name and called, S. Radujillian Chaudhari, at [phone]. I spoke to Ms Radu Chaudhari and she stated she will need the completed UAI and the 225 from the accepting facility. After receipt of those items the patient can be converted. Ms Radu Chaudhari ask that when we have the  UAI it  is to be faxed to [phone]. Ms Radu Chaudhari also asked that she be called to expedite the conversion, [phone]. I have called the [address] [place] [address] [phone] and requested copy of the Screening to be faxed to me at the case management office. The health department is the agency I was told stores the UAI. Once obtained I can then fax this to Ms SHaja Chaudhari who can convert the patient to ' Full Medicaid. I have spoke to palliative care and explained this process.   The patient daughter may elect for the patient to discharge to a facility under skilled care. I explained to Palliative care that she will need PT and OT screening and will need to be authorized by [facility] if they elect to discharge as skilled. I have paged Dr Isra Bay and requested PT and OT evaluations.   Nacho Minaya RN

## 2018-03-09 NOTE — PROGRESS NOTES
Received patient from ICU. Received report from [address]. Dual NIH completed at bedside. Call bell and belongings within reach. RN to continue to monitor patient. @4729 Patient c/o nausea. Elevated HOB. Medicated patient. See MAR.     @1400 Patient tolerated lunch well. Denies nausea. @0617 Received call from CARLOS Noel with Palliative care. Order received for \"no escalation of care, no transfer to ICU, no pressor support. \" RBV. Order entered as requested. @ 1807 /78 and HR 58. Noted goal to keep SBP <160. PRN labetalol parameters are to hold if HR <60. PRN not given at this time. Will reassess BP. Recheck /79 within goal.     @2020 Bedside shift change report given to Isabelle Monte RN (oncoming nurse) by Teo Ding RN (offgoing nurse). Report included the following information SBAR and Kardex. Dual NIH completed.

## 2018-03-09 NOTE — PROGRESS NOTES
TRANSFER - OUT REPORT:    Verbal report given to ROSSI Mercer (name) on Danney Sandifer  being transferred to [facility] (unit) for routine progression of care       Report consisted of patients Situation, Background, Assessment and   Recommendations(SBAR). Information from the following report(s) SBAR, Kardex, Intake/Output, MAR, Recent Results and Cardiac Rhythm SB-SR was reviewed with the receiving nurse. Lines:   Peripheral IV 03/06/18 Left Antecubital (Active)   Site Assessment Clean, dry, & intact 3/9/2018 10:00 AM   Phlebitis Assessment 0 3/9/2018 10:00 AM   Infiltration Assessment 0 3/9/2018 10:00 AM   Dressing Status Clean, dry, & intact 3/9/2018 10:00 AM   Dressing Type Transparent;Tape 3/9/2018  8:00 AM   Hub Color/Line Status Pink; Infusing 3/9/2018  8:00 AM   Action Taken Open ports on tubing capped 3/9/2018  8:00 AM   Alcohol Cap Used Yes 3/9/2018  8:00 AM       Peripheral IV 03/06/18 Right Forearm (Active)   Site Assessment Clean, dry, & intact 3/9/2018 10:00 AM   Phlebitis Assessment 0 3/9/2018 10:00 AM   Infiltration Assessment 0 3/9/2018 10:00 AM   Dressing Status Clean, dry, & intact 3/9/2018 10:00 AM   Dressing Type Transparent;Tape 3/9/2018  8:00 AM   Hub Color/Line Status Pink;Flushed;Capped 3/9/2018  8:00 AM   Action Taken Open ports on tubing capped 3/9/2018  8:00 AM   Alcohol Cap Used Yes 3/9/2018  8:00 AM        Opportunity for questions and clarification was provided.       Patient transported with:   Registered Nurse

## 2018-03-09 NOTE — PROGRESS NOTES
Patient is unable to communicate at this time as she is resting peacefully at this time due to her current illness. No family seen at time of this visit.  offered prayer and left Spiritual Care brochure. Chaplains will continue to follow and will provide pastoral care on an as needed/requested basis.     Yaquelin Guillermo   Spiritual Care   [phone]

## 2018-03-09 NOTE — ROUTINE PROCESS
Bedside shift change report given to Eda Olvera (oncoming nurse) by Gerri Maravilla RN (offgoing nurse). Report included the following information SBAR, Kardex, ED Summary, Procedure Summary, Intake/Output, MAR, Accordion, Recent Results, Med Rec Status and Cardiac Rhythm Sinus Megan Deal.

## 2018-03-09 NOTE — PROGRESS NOTES
Problem: Self Care Deficits Care Plan (Adult)  Goal: *Acute Goals and Plan of Care (Insert Text)  Occupational Therapy Goals  Initiated 3/7/2018 within 7 day(s). Pt re-evaluated on 3/9/2018 due to OOB orders. Updated goals listed below. Bedrest Goals  1. Patient will perform grooming tasks with supervision/set-up. 2.  Patient will perform functional task for 8 minutes with minimal assistance to incorporate LUE to increase coordination and control. 3.  Patient will perform upper body dressing with supervision/set-up. 4.  Patient will participate in upper extremity therapeutic exercise/activities with supervision/set-up for 8 minutes to increase LUE strength/control for functional transfers and ADLs. 5.  Patient will utilize energy conservation techniques during functional activities with minimal verbal cues. Updated Goals (Julia Yang MS, OTR/L)  1. Patient will perform grooming tasks at EOB with minimal assistance to incorporate LUE to increase coordination. .  2.  Patient will perform upper body dressing with supervision for balance. 3.  Patient will perform functional task at EOB for 8 minutes with supervision for balance to increase activity tolerance for ADLs. 4.  Patient will perform toilet transfers with supervision/set-up. 5.  Patient will perform all aspects of toileting with supervision/set-up. 6.  Patient will participate in upper extremity therapeutic exercise/activities with supervision/set-up for 8 minutes to maintain  BUE strength for ADLs. 7.  Patient will utilize energy conservation techniques during functional activities with minimal verbal cues. Outcome: Progressing Towards Goal  Occupational Therapy ReEVALUATION    Patient: Claudetta Blazer (89 y.o. female)  Date: 3/9/2018  Diagnosis: CVA (cerebral vascular accident) [facility]) <principal problem not specified>       Precautions: Fall, Seizure  PLOF: Per chart review, pt required assistance with most ADLs PTA.   ASSESSMENT :  Based on the objective data described below, the patient presents with With impairments with regard to bed mobility, activity tolerance and independence in ADLs. Pt now has OOB orders. Re-evaluted to determine current level of function. Pt requires min/mod A for bed mobility; fair sitting balance. Supervision/min A for don/doff hospital gown (episode of incontinence). Min A x2 for functional transfers in prep for ADLs and BSC transfer; trunk flexion noted in standing; unable to assume upright posture despite verbal/tactile cues. Max/total A for pericare due to impaired standing balance. Edmund pads changed. Pt returned supine, HOB elevated. Needs within reach. ROSSI Mercer notified about pts episode of incontinence. Recommend SNF upon d/c. Patient will benefit from skilled intervention to address the above impairments.   Patients rehabilitation potential is considered to be Good  Factors which may influence rehabilitation potential include:   []                None noted  []                Mental ability/status  [x]                Medical condition  []                Home/family situation and support systems  []                Safety awareness  []                Pain tolerance/management  []                Other:        PLAN :  Recommendations and Planned Interventions:  [x]                  Self Care Training                  [x]           Therapeutic Activities  [x]                  Functional Mobility Training    []           Cognitive Retraining  [x]                  Therapeutic Exercises           [x]           Endurance Activities  [x]                  Balance Training                   []           Neuromuscular Re-Education  []                  Visual/Perceptual Training     [x]      Home Safety Training  [x]                  Patient Education                 [x]           Family Training/Education  []                  Other (comment):    Frequency/Duration: Patient will be followed by occupational therapy 3-5 times a week to address goals. Discharge Recommendations: [facility]  Further Equipment Recommendations for Discharge: TBD at next level of care     SUBJECTIVE:   Patient stated My daughter Tiff Lo"    OBJECTIVE DATA SUMMARY:   Hospital course since last seen and reason for reevaluation: Pt now has OOB orders. Re-evaluted to determine current level of function. Pt requires min/mod A for bed mobility and min A x2 for functional transfers in prep for ADLs. Recommend continued therapy. G CODES:  Self Care [id number] Current  CL= 60-79%  [id number] Goal  CJ= 20-39%. The severity rating is based on the Other Functional assessment, MMT, ROM     Eval Complexity: History: MEDIUM Complexity : Expanded review of history including physical, cognitive and psychosocial  history ; Examination: MEDIUM Complexity : 3-5 performance deficits relating to physical, cognitive , or psychosocial skils that result in activity limitations and / or participation restrictions; Decision Making:MEDIUM Complexity : Patient may present with comorbidities that affect occupational performnce. Miniml to moderate modification of tasks or assistance (eg, physical or verbal ) with assesment(s) is necessary to enable patient to complete evaluation     Cognitive/Behavioral Status:  Neurologic State: Alert  Orientation Level: Disoriented X4, Oriented to person, Oriented to time  Cognition: Follows commands  Safety/Judgement: Awareness of environment, Fall prevention     Skin: Intact (BUEs)  Edema: None noted (BUEs)    Vision/Perceptual:    Acuity: Able to read clock/calendar on wall without difficulty      Coordination:  Coordination: Generally decreased, functional (LUE ataxic)  Fine Motor Skills-Upper: Right Intact; Left Intact    Gross Motor Skills-Upper: Right Intact; Left Intact     Balance:  Sitting: Impaired  Sitting - Static: Fair (occasional)  Sitting - Dynamic: Fair (occasional) (Fair-)  Standing: Impaired  Standing - Static: Fair  Standing - Dynamic : Poor     Strength:  Strength: Within functional limits    Tone & Sensation:  Tone: Normal  Sensation: Intact    Range of Motion:  AROM: Within functional limits (BUEs)    Functional Mobility and Transfers for ADLs:  Bed Mobility:  Supine to Sit: Moderate assistance  Sit to Supine: Moderate assistance    Transfers:  Sit to Stand: Minimum assistance;Assist x2   Toilet Transfer :  (not assessed)     ADL Assessment:  Feeding: Setup;Minimum assistance  Oral Facial Hygiene/Grooming: Setup;Stand-by assistance  Bathing: Moderate assistance  Upper Body Dressing: Setup;Stand-by assistance  Lower Body Dressing: Moderate assistance  Toileting: Maximum assistance (purewick)    Cognitive Retraining  Safety/Judgement: Awareness of environment; Fall prevention    Pain:  Pre treatment pain level: 0/10  Post treatment pain level: 0/10  Pain Scale 1: Numeric (0 - 10)  Pain Intensity 1: 0    Activity Tolerance:  Fair  Please refer to the flowsheet for vital signs taken during this treatment. After treatment:   [] Patient left in no apparent distress sitting up in chair  [x] Patient left in no apparent distress in bed  [x] Call bell left within reach  [x] Nursing notified  [] Caregiver present  [] Bed alarm activated    COMMUNICATION/EDUCATION: Pt educated on role of JEET Bright; she needs reinforcement. [x]    Home safety education was provided and the patient/caregiver indicated understanding. [x]    Patient/family have participated as able in goal setting and plan of care. [x]    Patient/family agree to work toward stated goals and plan of care. []    Patient understands intent and goals of therapy, but is neutral about his/her participation. []    Patient is unable to participate in goal setting and plan of care. This patients plan of care is appropriate for delegation to [facility].     Thank you for this referral.    Elva Campos MS OTR/L  Time Calculation: 11 mins

## 2018-03-09 NOTE — PROGRESS NOTES
0700- Report received from [facility] and assumed care of patient. Patient alert and oriented x3. No signs of distress. VS stable. Dual NIHSS performed. Will continue to monitor. 0800- Oral care, face washed, and marcos care provided at this time. 0930- Patient up in bed eating breakfast. No signs of distress. VS stable. Will continue to monitor. 1100- Patient resting comfortably. VS stable. No signs of distress. Will continue to monitor.

## 2018-03-09 NOTE — PROGRESS NOTES
Problem: Self Care Deficits Care Plan (Adult)  Goal: *Acute Goals and Plan of Care (Insert Text)  Occupational Therapy Goals  Initiated 3/7/2018 within 7 day(s). Pt seen at bed level; will re-evaluate with OOB activity. 1.  Patient will perform grooming tasks with supervision/set-up. 2.  Patient will perform functional task for 8 minutes with minimal assistance to incorporate LUE to increase coordination and control. 3.  Patient will perform upper body dressing with supervision/set-up. 4.  Patient will participate in upper extremity therapeutic exercise/activities with supervision/set-up for 8 minutes to increase LUE strength/control for functional transfers and ADLs. 5.  Patient will utilize energy conservation techniques during functional activities with minimal verbal cues. Occupational Therapy TREATMENT    Patient: Yang Juares (89 y.o. female)  Date: 3/9/2018  Diagnosis: CVA (cerebral vascular accident) [facility]) <principal problem not specified>       Precautions: Fall, Skin, Seizure (bedrest)  Chart, occupational therapy assessment, plan of care, and goals were reviewed. PLOF: Pt was independent with basic self care tasks and utilized RW for functional mobility PTA. ASSESSMENT:  Pt presented supine in bed with HOB raised agreeable to skilled OT services this date. Supportive daughter and grandson present during treatment session. Pt performed functional task to incorporate using LUE to increase coordination and control with SBA. Pt held onto small bowl of grapes with R hand and utilized L hand to  the grapes and bring to mouth. Pt was able to perform function without dropping bowl or grapes and was able to control LUE to mouth. Pt participated in [address] [place] [address] strengthening to increase strength/control for carryover to functional transfers and ADLs. Pt utilized yellow theraband to perform elbow flexion/extension and horizontal shoulder add-/abduction (2 sets x 10 reps each).  Pt utilized LUE while performing exercises. Pt was left comfortable in bed and call bell within reach. Supportive family present. EDUCATION Educated pt and family on importance of incorporating LUE during functional tasks for greater independence. Educated family on allowing pt to perform tasks and that extra time may be required. Progression toward goals:  []          Improving appropriately and progressing toward goals  [x]          Improving slowly and progressing toward goals  []          Not making progress toward goals and plan of care will be adjusted     PLAN:  Patient continues to benefit from skilled intervention to address the above impairments. Continue treatment per established plan of care. Discharge Recommendations:  [facility]  Further Equipment Recommendations for Discharge:  TBD     SUBJECTIVE:   Patient stated It is October.  When asked what today's date was. OBJECTIVE DATA SUMMARY:     G CODES:  Self Care  Current  CJ= 20-39%. The severity rating is based on the Other functional assessment    Cognitive/Behavioral Status:  Neurologic State: Alert  Orientation Level: Oriented to person, Oriented to place  Cognition: Follows commands  Safety/Judgement: Fall prevention, Awareness of environment  Functional Mobility and Transfers for ADLs:        Therapeutic Exercises:   Pt participated in BUE strengthening to increase strength/control for carryover to functional transfers and ADLs. Pt utilized yellow theraband to perform elbow flexion/extension and horizontal shoulder add-/abduction (2 sets x 10 reps each). Pt utilized LUE while performing exercises. Pain:  Pre Treatment:0/10  Post Treatment:0/10  Pain Scale 1: Numeric (0 - 10)  Pain Intensity 1: 0     Activity Tolerance:    Fair  Please refer to the flowsheet for vital signs taken during this treatment.   After treatment:   []  Patient left in no apparent distress sitting up in chair  [x]  Patient left in no apparent distress in bed  [x]  Call bell left within reach  []  Nursing notified  [x]  Caregiver present (daughter and grandson)  []  Bed alarm activated    JANNY White/L  Time Calculation: 28 mins

## 2018-03-09 NOTE — PROGRESS NOTES
New OT order received and acknowledged. Pt re-evaluated to determine OOB activity. Formal documentation to follow.     Mic Hickman MS OTR/L  Office Ext: [phone]  Pager: [phone]

## 2018-03-10 NOTE — PROGRESS NOTES
Progress Note      Patient: Caroline Hollingsworth               Sex: female          DOA: 3/6/2018       YOB: 1950      Age:  75 y.o.        LOS:  LOS: 4 days               Subjective:   Pt is more responsive today. This is probably due to the decadron. Discussed with the pt's daughter . bp is high will add bp meds       Objective:      Visit Vitals    BP (!) 157/98 (BP 1 Location: Right arm, BP Patient Position: At rest)    Pulse 65    Temp 98 °F (36.7 °C)    Resp 16    Ht 5' 2\" (1.575 m)    Wt 89.3 kg (196 lb 13.9 oz)    SpO2 (!) 65%    Breastfeeding No    BMI 36.01 kg/m2       Physical Exam:  Pt is awake and is still not able to indulge in conversation   Heart reg rate and rhythm   Lungs good air flow   Abdomen soft and nontender   Neuro flat affect remains but she is more responsive        Lab/Data Reviewed:  BMP: No results found for: NA, K, CL, CO2, AGAP, GLU, BUN, CREA, GFRAA, GFRNA  CMP:   Lab Results   Component Value Date/Time    MG 2.0 03/09/2018 03:14 PM    PHOS 3.0 03/09/2018 03:14 PM    ALB 3.4 03/09/2018 03:14 PM    TP 7.1 03/09/2018 03:14 PM    GLOB 3.7 03/09/2018 03:14 PM    AGRAT 0.9 03/09/2018 03:14 PM    SGOT 8 (L) 03/09/2018 03:14 PM    ALT 17 03/09/2018 03:14 PM     CBC: No results found for: WBC, HGB, HGBEXT, HCT, HCTEXT, PLT, PLTEXT, HGBEXT, HCTEXT, PLTEXT        Assessment/Plan     Active Problems:    CVA (cerebral vascular accident) ([facility] [address] (3/6/2018)      Hemorrhage of brain, nontraumatic ([facility] [address] (3/7/2018)      Dementia associated with other underlying disease (3/7/2018)      Debility (3/7/2018)  htn   Cns lymphoma       Plan:will add norvasc for now .  Plan is to get the pt to rehab

## 2018-03-10 NOTE — PROGRESS NOTES
Problem: Falls - Risk of  Goal: *Absence of Falls  Document Anjum Fall Risk and appropriate interventions in the flowsheet.    Outcome: Progressing Towards Goal  Fall Risk Interventions:  Mobility Interventions: Communicate number of staff needed for ambulation/transfer, Patient to call before getting OOB         Medication Interventions: Evaluate medications/consider consulting pharmacy, Patient to call before getting OOB    Elimination Interventions: Call light in reach    History of Falls Interventions: Door open when patient unattended

## 2018-03-10 NOTE — PROGRESS NOTES
Pt in bed resting alert and oriented x3 denies pain at the moment. Assessement completed plan of care for the shift explained pt verbalized understanding. IVF infusing well, HOB elevated, bed low and in locked position. Dual NIH done no changes. Daughter and grand at bedside  Call light and frequently used items within reach. 0600- Pt given a shaower made comfortable in bed     0745- Bedside and Verbal shift change report given to Gunjan Schreiber, [address] [place] [address] (oncoming nurse) by Emily Rivas RN (offgoing nurse). Report included the following information SBAR, Kardex, Intake/Output, MAR and Recent Results.

## 2018-03-10 NOTE — PROGRESS NOTES
Problem: Falls - Risk of  Goal: *Absence of Falls  Document Anjum Fall Risk and appropriate interventions in the flowsheet.    Outcome: Progressing Towards Goal  Fall Risk Interventions:  Mobility Interventions: Patient to call before getting OOB         Medication Interventions: Bed/chair exit alarm    Elimination Interventions: Call light in reach, Patient to call for help with toileting needs    History of Falls Interventions: Bed/chair exit alarm

## 2018-03-10 NOTE — PROGRESS NOTES
Problem: Falls - Risk of  Goal: *Absence of Falls  Document Anjum Fall Risk and appropriate interventions in the flowsheet.    Fall Risk Interventions:  Mobility Interventions: Patient to call before getting OOB         Medication Interventions: Bed/chair exit alarm    Elimination Interventions: Call light in reach, Patient to call for help with toileting needs    History of Falls Interventions: Bed/chair exit alarm

## 2018-03-10 NOTE — PROGRESS NOTES
[facility]:     Folic acid 1 mg, Thiamine 100 mg in NS 50 ml IVPB was discontinued and   Folic acid 1 mg PO daily, and   Thiamine 100 mg PO daily were ordered. If the patient no longer meets all criteria for oral therapy, the pharmacist will switch back to IV therapy. Thanks.

## 2018-03-10 NOTE — PROGRESS NOTES
Assumed care of patient from Raissa Warner RN (offgoing nurse). Patient is awake in bed with no complaints at this time. Bed locked and in lowest position with call bell in reach. Patient encouraged to call for assistance. Patient verbalized understanding. Dual [facility] completed. 1945- Bedside and Verbal shift change report given to Brionna Hayden RN  (oncoming nurse) by Odalis Pimentel (offgoing nurse). Report included the following information SBAR, Kardex, Intake/Output, MAR and Recent Results. Dual [facility] completed.

## 2018-03-11 NOTE — PROGRESS NOTES
Problem: Mobility Impaired (Adult and Pediatric)  Goal: *Acute Goals and Plan of Care (Insert Text)  3/10/2018  Physical Therapy Goals Initiated 3/7/2018 met at this time. New goals as follows and to be accomplished within 7 day(s)  1. Patient will move from supine to sit and sit to supine , scoot up and down and roll side to side in bed with minimal assistance/contact guard assist.     2.  Patient will transfer from bed to chair and chair to bed with minimal assistance/contact guard assist using the least restrictive device. 3.  Patient will perform sit to stand with minimal assistance/contact guard assist.  4.  Patient will ambulate with minimal assistance/contact guard assist for >/= 50 feet with the least restrictive device.   -----Raj Espinoza. LAURA López      Physical Therapy Goals  Initiated 3/7/2018 and to be accomplished within 7 day(s)  1. Patient will move from supine to sit and sit to supine  and roll side to side in bed with maximal assistance to aid in positioning. 2.  Patient will maintain BLE ROM/strength via functional maintenance program to prepare for OOB activity. Outcome: Progressing Towards Goal  physical Therapy TREATMENT    Patient: Casie Oconnor (89 y.o. female)  Date: 3/11/2018  Diagnosis: CVA (cerebral vascular accident) [facility]) <principal problem not specified>       Precautions: Fall, Seizure  Chart, physical therapy assessment, plan of care and goals were reviewed. PLOF: lives w daughter & son in law; uses walker    ASSESSMENT: presents in bed. Agreeable to PT. Reports not having a great day. Minimal assist for standing however difficulty with and slow to initiate taking steps. Unable to take steps fwd away from eob at this time. Side stepping only. Pt nausea and vomiting sm. amt emesis after activity. RN present .      EDUCATION:  transfer   Progression toward goals:        Improving slowly and progressing toward goals     PLAN:  Patient continues to benefit from skilled intervention to address the above impairments. Continue treatment per established plan of care. Discharge Recommendations:  SNF  Further Equipment Recommendations for Discharge:  determine once c ompletes rehab     SUBJECTIVE:   Patient stated     OBJECTIVE DATA SUMMARY:   Critical Behavior:  Neurologic State: Alert  Orientation Level: Oriented X4  Cognition: Follows commands  Safety/Judgement: Awareness of environment, Fall prevention    G CODE:Mobility [id number] Current  CL= 60-79%   Goal  CK= 40-59%. The severity rating is based on the Other [facility] Balance Scale  1+: Pt supports self with upper extremities but requires therapist assistance. Pt performs >50% effort. (Min assist). CL, 60% to <80% impaired. Functional Mobility Training:  Bed Mobility:  Supine to Sit: Moderate assistance  Sit to Supine: Stand-by assistance  Transfers:  Sit to Stand: Minimum assistance (x 4 trials)  Stand to Sit: Contact guard assistance  Interventions: Safety awareness training; Tactile cues; Verbal cues  Balance:  Sitting - Static: Good (unsupported)  Sitting - Dynamic: Fair (occasional) (plus)  Standing: Impaired; With support  Standing - Static: Fair  Standing - Dynamic : Poor  Ambulation/Gait Training: side stepping along eob 3 steps x 3 trials w MIN A for moving walker; VC for sequencing  Therapeutic Exercises:   Seated  B LAQ x 10; Bridging; reaching all planes sitting eob min excursions  Vital Signs  Temp: 98.2 °F (36.8 °C)     Pulse (Heart Rate): 69     BP: (!) 173/93 (post STANDING ACTIVITY)     Resp Rate: 16     O2 Sat (%): 96 %  Pain:  Pre treatment pain level:  0  Post treatment pain level:  0  Pain Scale 1: Numeric (0 - 10)  Pain Intensity 1: 0  Activity Tolerance:   poor  After treatment:   Patient left in no apparent distress in bed: HOB raised  Call bell left within reach  Nursing Roxana Penaloza present    Nupur Ludwig PTA   Time Calculation: 24 mins

## 2018-03-11 NOTE — PROGRESS NOTES
Bedside and Verbal shift change report given to Becki Torre RN (oncoming nurse) by ROSSI Morton (offgoing nurse). Report included the following information SBAR, Kardex and MAR. Assessment completed. Dual NIH completed. [id number], calm and cooperative. Patient denies pain at this time. Family at bedside. Bed locked and at lowest position. Call bell within reach. Patient resting comfortably. 0727-Bedside and Verbal shift change report given to Patel Cuello (oncoming nurse) by Becki Torre RN (offgoing nurse). Report included the following information SBAR, Kardex and MAR. Dual NIH completed. Patient resting in bed.

## 2018-03-11 NOTE — PROGRESS NOTES
Assumed care of patient from Yari Reid RN (offgoing nurse). Patient resting in bed with no signs or symptoms of distress noted. Bed locked and in lowest position with call bell in reach. Dual [facility] completed. 1927- Bedside and Verbal shift change report given to Basilia Tobar RN   (oncoming nurse) by Bhumika Cheung (offgoing nurse). Report included the following information SBAR, Kardex, Intake/Output, MAR and Recent Results. Dual [facility] completed.

## 2018-03-11 NOTE — PROGRESS NOTES
Assumed care of patient, dual NIH completed with Iliana Enriquez. No c/o pain. Sbar report received from [facility]. Call bell in , bed in lowest position. 0121: Prn Zofran given to patient, patient vomited x2.     0145: Patient resting comfortably, no nausea or vomiting has subsided.

## 2018-03-11 NOTE — PROGRESS NOTES
Problem: Falls - Risk of  Goal: *Absence of Falls  Document Anjum Fall Risk and appropriate interventions in the flowsheet.    Outcome: Progressing Towards Goal  Fall Risk Interventions:  Mobility Interventions: Communicate number of staff needed for ambulation/transfer, Patient to call before getting OOB         Medication Interventions: Patient to call before getting OOB    Elimination Interventions: Call light in reach, Patient to call for help with toileting needs    History of Falls Interventions: Door open when patient unattended

## 2018-03-11 NOTE — PROGRESS NOTES
Problem: Mobility Impaired (Adult and Pediatric)  Goal: *Acute Goals and Plan of Care (Insert Text)  3/10/2018  Physical Therapy Goals Initiated 3/7/2018 met at this time. New goals as follows and to be accomplished within 7 day(s)  1. Patient will move from supine to sit and sit to supine , scoot up and down and roll side to side in bed with minimal assistance/contact guard assist.     2.  Patient will transfer from bed to chair and chair to bed with minimal assistance/contact guard assist using the least restrictive device. 3.  Patient will perform sit to stand with minimal assistance/contact guard assist.  4.  Patient will ambulate with minimal assistance/contact guard assist for >/= 50 feet with the least restrictive device.   -----Marcel Melgar. LAURA López      Physical Therapy Goals  Initiated 3/7/2018 and to be accomplished within 7 day(s)  1. Patient will move from supine to sit and sit to supine  and roll side to side in bed with maximal assistance to aid in positioning. 2.  Patient will maintain BLE ROM/strength via functional maintenance program to prepare for OOB activity. physical Therapy RE-EVALUATION and TREATMENT    Patient: Claudetta Blazer (28 y.o. female)  Date: 3/10/2018  Diagnosis: CVA (cerebral vascular accident) [facility]) <principal problem not specified>       Precautions: Fall, Seizure  PLOF: Ambulatory with walker    ASSESSMENT:  Patient off bedrest.  Patient seen today for functional mobility assessment. Patient required increased cues to follow command. Required mod Assist with bed mobility. Min Assist of 2 sit to stand from bed and mod assist of 2 sit to stand from chair. Ambulated with rolling walker 4 feet with min Assist of 2. Will benefit from skilled PT intervention to increase overall functional mobility independence and safety.   EDUCATION: Plan of care, bed mobility, transfers and gait with rolling walker   Patient's progression toward goals since last assessment: goals met. New goals set     PLAN:  Goals have been updated based on progression since last assessment. Patient continues to benefit from skilled intervention to address the above impairments. Continue to follow the patient 1 time per day/4-7 days per week to address goals. Planned Interventions:  [x]     Bed Mobility Training          []     Neuromuscular Re-Education  [x]     Transfer Training                []    Orthotic/Prosthetic Training  [x]     Gait Training                       []     Modalities  []     Therapeutic Exercises       []     Edema Management/Control  []     Therapeutic Activities         [x]     Patient and Family Training/Education  []     Other (comment):  Discharge Recommendations: Skilled Nursing Facility  Further Equipment Recommendations for Discharge: to be determined at the next level of care     SUBJECTIVE:   Patient flat affect, agreeable to working with therapy with encouragement from daughter and grand-son    OBJECTIVE DATA SUMMARY:     G CODES:  Mobility  Current  CL= 60-79%. The severity rating is based on the Other [address] [address] Standing Balance Scale  0: Pt performs 25% or less of standing activity (Max assist) CN, 100% impaired. 1: Pt supports self with upper extremities but requires therapist assistance. Pt performs 25-50% of effort (Mod assist) CM, 80% to <100% impaired. 1+: Pt supports self with upper extremities but requires therapist assistance. Pt performs >50% effort. (Min assist). CL, 60% to <80% impaired. 2: Pt supports self independently with both upper extremities (walker, crutches, parallel bars). CL, 60% to <80% impaired. 2+: Pt support self independently with 1 upper extremity (cane, crutch, 1 parallel bar). CK, 40% to <60% impaired. 3: Pt stands without upper extremity support for up to 30 seconds. CK, 40% to <60% impaired. 3+: Pt stands without upper extremity support for 30 seconds or greater.  CJ, 20% to <40% impaired. 4: Pt independently moves and returns center of gravity 1-2 inches in one plane. CJ, 20% to <40% impaired. 4+: Pt independently moves and returns center of gravity 1-2 inches in multiple planes. CI, 1% to <20% impaired. 5: Pt independently moves and returns center of gravity in all planes greater than 2 inches. CH, 0% impaired. Critical Behavior:  Neurologic State: Alert  Orientation Level: Oriented to person, Oriented to place  Cognition: Follows commands  Safety/Judgement: Awareness of environment, Fall prevention  Functional Mobility Training:  Bed Mobility:  Supine to Sit: Moderate assistance  Sit to Supine: Moderate assistance  Transfers:  Sit to Stand: Minimum assistance;Assist x2 (from bed; mod assist of 2 from chair)  Stand to Sit: Minimum assistance;Assist x2  Bed to Chair: Moderate assistance;Assist x2 (bed <> chair)  Balance:  Sitting: Impaired  Sitting - Static: Fair (occasional)  Sitting - Dynamic: Fair (occasional) (Minus)  Standing: Impaired; With support  Standing - Static: Fair  Standing - Dynamic : Poor (Plus)  Ambulation/Gait Training:  Ambulation - Level of Assistance: Minimal assistance;Assist x2  Gait Abnormalities: Decreased step clearance  Base of Support: Center of gravity altered  Speed/Ebony: Slow  Step Length: Left shortened;Right shortened    Pain:  Pre treatment pain:  0  Post treatment pain:  0  Pain Scale 1: Numeric (0 - 10)  Pain Intensity 1: 0      Activity Tolerance:   Fair Minus. Some nausea noted during treatment but no vomitting    Please refer to the flowsheet for vital signs taken during this treatment.   After treatment:   []  Patient left in no apparent distress sitting up in chair  [x]  Patient left in no apparent distress in bed  [x]  Call bell left within reach  [x]  Nursing notified  [x]  Family present  []  Bed alarm activated    Recommendations for nursing:  Written on communication board: OOB with assist of 2  Verbally communicated to: nurse Darien Gonzalez, PT   Time Calculation: 40 mins

## 2018-03-11 NOTE — PROGRESS NOTES
Progress Note      Patient: Tameka Bloom               Sex: female          DOA: 3/6/2018       YOB: 1950      Age:  75 y.o.        LOS:  LOS: 5 days               Subjective:   Pt is much more alert today and her speech has improved greatly . She was sitting up in the bed today . she will be slowly mobilized / waiting for arrangements to be made so that she can go home with hospice or go to rehab first . Pt is a dnt      Objective:      Visit Vitals    /61 (BP 1 Location: Left arm, BP Patient Position: At rest)    Pulse (!) 56    Temp 98.9 °F (37.2 °C)    Resp 16    Ht 5' 2\" (1.575 m)    Wt 89.3 kg (196 lb 13.9 oz)    SpO2 97%    Breastfeeding No    BMI 36.01 kg/m2       Physical Exam:  Pt is alert and appears yanna comfortable   Heart reg rate and rhythm   Lungs good breath sounds heard   Abdomen soft and nontender   Neuro more animated today         Lab/Data Reviewed:  BMP:   Lab Results   Component Value Date/Time     03/11/2018 06:25 AM    K 3.7 03/11/2018 06:25 AM     03/11/2018 06:25 AM    CO2 27 03/11/2018 06:25 AM    AGAP 7 03/11/2018 06:25 AM     (H) 03/11/2018 06:25 AM    BUN 19 (H) 03/11/2018 06:25 AM    CREA 0.64 03/11/2018 06:25 AM    GFRAA >60 03/11/2018 06:25 AM    GFRNA >60 03/11/2018 06:25 AM     CMP:   Lab Results   Component Value Date/Time     03/11/2018 06:25 AM    K 3.7 03/11/2018 06:25 AM     03/11/2018 06:25 AM    CO2 27 03/11/2018 06:25 AM    AGAP 7 03/11/2018 06:25 AM     (H) 03/11/2018 06:25 AM    BUN 19 (H) 03/11/2018 06:25 AM    CREA 0.64 03/11/2018 06:25 AM    GFRAA >60 03/11/2018 06:25 AM    GFRNA >60 03/11/2018 06:25 AM    CA 9.0 03/11/2018 06:25 AM    MG 2.0 03/10/2018 04:22 PM    PHOS 2.9 03/10/2018 04:22 PM    ALB 3.3 (L) 03/11/2018 06:25 AM    TP 6.7 03/11/2018 06:25 AM    GLOB 3.4 03/11/2018 06:25 AM    AGRAT 1.0 03/11/2018 06:25 AM    SGOT 7 (L) 03/11/2018 06:25 AM    ALT 16 03/11/2018 06:25 AM     CBC: Lab Results   Component Value Date/Time    WBC 8.0 03/11/2018 06:25 AM    HGB 12.9 03/11/2018 06:25 AM    HCT 38.6 03/11/2018 06:25 AM     03/11/2018 06:25 AM           Assessment/Plan     Active Problems:    CVA (cerebral vascular accident) ([facility] (3/6/2018)      Hemorrhage of brain, nontraumatic ([facility] (3/7/2018)      Dementia associated with other underlying disease (3/7/2018)      Debility (3/7/2018)  Cns lymphoma . Plan: continue on decadron at 2 mg po bid waiting for placement The current medical regimen is effective;  continue present plan and medications.  Pt and ot

## 2018-03-12 PROBLEM — K59.09 OTHER CONSTIPATION: Status: ACTIVE | Noted: 2018-01-01

## 2018-03-12 NOTE — PROGRESS NOTES
Assumed care of patient from [place], [place] (offgoing nurse). Patient is awake in bed with no complaints at this time. Dual NIH completed. Bed locked and in lowest position with call bell in reach. Encouraged patient to use call bell for assistance. Patient voiced understanding. 1045- Patient working with PT when experienced N/V. P.R.N Zofran given. Will continue to monitor patient. 1305- Verbal orders received from Aline Siddiqui NP to discontinue continuous fluids. RBV. 56- Patient discharge to [facility] accompanied by transport via stretcher to transport vehicle. No distress noted. Patient has discharge instructions along with belongings. Voiced understanding of discharge instructions. Report called to [facility]. Was transferred many times before able to give report at 1810. Report called to ROSSI Leon. Report included the following information SBAR, Kardex, Intake/Output, MAR and Recent Results.

## 2018-03-12 NOTE — PROGRESS NOTES
Problem: Mobility Impaired (Adult and Pediatric)  Goal: *Acute Goals and Plan of Care (Insert Text)  3/10/2018  Physical Therapy Goals Initiated 3/7/2018 met at this time. New goals as follows and to be accomplished within 7 day(s)  1. Patient will move from supine to sit and sit to supine , scoot up and down and roll side to side in bed with minimal assistance/contact guard assist.     2.  Patient will transfer from bed to chair and chair to bed with minimal assistance/contact guard assist using the least restrictive device. 3.  Patient will perform sit to stand with minimal assistance/contact guard assist.  4.  Patient will ambulate with minimal assistance/contact guard assist for >/= 50 feet with the least restrictive device.   -----Isai Das. LAURA López      Physical Therapy Goals  Initiated 3/7/2018 and to be accomplished within 7 day(s)  1. Patient will move from supine to sit and sit to supine  and roll side to side in bed with maximal assistance to aid in positioning. 2.  Patient will maintain BLE ROM/strength via functional maintenance program to prepare for OOB activity. Outcome: Progressing Towards Goal  physical Therapy TREATMENT    Patient: Radha Obrien (11 y.o. female)  Date: 3/12/2018  Diagnosis: CVA (cerebral vascular accident) [facility]) <principal problem not specified>       Precautions: Fall, Seizure  Chart, physical therapy assessment, plan of care and goals were reviewed. PLOF:   Lives w family. Grandson present qand reports pt was ambulatory with a walker two weeks ago but pta went from this to dependent. ASSESSMENT:  Cont w N/V w mobility. Grandson reports this is baseline for patient 2/2 lymphoma cerebellum and cerebrum. Grandson also reports some baseline  confusion/dementia Progressed to OOB to chair w RW and MIN to MOD a x 1; max VC for safety and positioning for stand to sit  EDUCATION:  Grandson for LE ex.  Pt for hand placement during transfer  Progression toward goals:        Improving slowly and progressing toward goals       PLAN:  Patient continues to benefit from skilled intervention to address the above impairments. Continue treatment per established plan of care. Discharge Recommendations:SNF  Further Equipment Recommendations for Discharge:determine once completes rehab     SUBJECTIVE:   Patient stated .    OBJECTIVE DATA SUMMARY:   Critical Behavior:  Neurologic State: Alert  Orientation Level: Oriented to person, Oriented to place, Oriented to time  Cognition: Follows commands  Safety/Judgement: Awareness of environment, Fall prevention    G CODE:Mobility [id number] Current  CL= 60-79%   Goal  CK= 40-59%. The severity rating is based on the Other [facility] Standing Balance Scale  1+: Pt supports self with upper extremities but requires therapist assistance. Pt performs >50% effort. (Min assist). CL, 60% to <80% impaired. Functional Mobility Training:  Bed Mobility:  Supine to Sit: Minimum assistance; Moderate assistance  Transfers:  Sit to Stand: Minimum assistance; Moderate assistance; Additional time;Assist x1  Stand to Sit: Minimum assistance; Moderate assistance (for safe positioning to chair)  Bed to Chair: Minimum assistance; Moderate assistance; Additional time;Assist x1  Interventions: Safety awareness training; Tactile cues; Verbal cues; Visual cues (max vc)     Balance:  Sitting - Static: Good (unsupported)  Sitting - Dynamic: Fair (occasional)  Standing - Static: Fair  Standing - Dynamic : Poor  Ambulation/Gait Training:  Distance (ft): 6 Feet (ft)  Assistive Device: Gait belt;Walker, rolling  Ambulation - Level of Assistance: Minimal assistance; Moderate assistance  Gait Abnormalities: Altered arm swing;Decreased step clearance  Base of Support: Center of gravity altered  Speed/Ebony: Delayed; Slow  Step Length: Left shortened;Right shortened  Therapeutic Exercises:   B Le heel slide, hip abd/add, AA SLR all x 10   Vital Signs  Temp: 98 °F (36.7 °C)     Pulse (Heart Rate): 77     BP: 187/84 (vomiting)     Resp Rate: 14     O2 Sat (%): 95 %  Pain:  Pre treatment pain level:   0  Post treatment pain level:  0  Pain Scale 1: Numeric (0 - 10)  Pain Intensity 1: 0  Activity Tolerance:   poor  After treatment:   Patient left in no apparent distress sitting up in recliner chair, LE's elevated  Call bell left within reach  [address] [facility] notified  Grandson present      Phuong Prudent, PTA   Time Calculation: 33 mins

## 2018-03-12 NOTE — PROGRESS NOTES
[facility]  Hospitalist Division        Inpatient Daily Progress Note    Daily progress Note    Patient: Carlo Astudillo MRN: [id number]  CSN: [id number]    YOB: 1950  Age: 75 y.o. Sex: female    DOA: 3/6/2018 LOS:  LOS: 6 days                    Chief Complaint:  Weakness, falls     Interval History:    Carlo Astudillo is a 75 y.o. female who was admitted to the hospital because of a h/o frequent falling at home. She also has had a recent h/o multiple episodes of vomiting. The pt has a h/o cns lymphoma and is s/p brainbiopsy . She had a biopsy of one of the frontal lobes and this established the diagnosis according to the family . The pt before she had her diagnosis of lymphoma had frequent episodes of falling and also had episodes of vomiting both of which have occurred prior to the present admission . The pt has had a ct scan of the head and this showed a small hyperdense hemorrhage within the lateral left frontal lobe and appears centered at the subcortical junction. No significant edema at this time . The volume of the hemorrhage was 16x 16x 14 mm. The pt has a h/h diabetes mellitus Type 2. Patient was admitted for ongoing management. MRI Brain showed Approximately 2 cm amorphous intraparenchymal acute hemorrhage posterior  superior left frontal lobe with surrounding ring-enhancing and nodular areas of enhancement in the adjacent frontal lobe parenchyma. This does not have appearance of hemorrhage associated with infarct. Palliative Care consulted- patient's daughter wishes for no escalation of care. Patient DNR/DNI. POST form signed and on chart. Patient matched for SNF and authorization is currently pending.     Subjective:      Sitting edge of bed with PT    Objective:      Visit Vitals    /83 (BP 1 Location: Right arm, BP Patient Position: Sitting)    Pulse 60    Temp 98 °F (36.7 °C)    Resp 15    Ht 5' 2\" (1.575 m)    Wt 89.3 kg (196 lb 13.9 oz)    SpO2 96%    Breastfeeding No    BMI 36.01 kg/m2       Physical Exam:  General appearance: alert, cooperative, no distress, appears stated age  Lungs: clear to auscultation bilaterally, no wheezes or rhonchi   Heart: regular rate and rhythm, S1, S2 normal, no murmur, click, rub or gallop  Abdomen: soft, non tender, non distended.  Normoactive bowel sounds  Extremities: extremities normal, atraumatic, no cyanosis or edema  Skin: Skin color, texture, turgor normal. No rashes or lesions  Neurologic: moves all 4 extremities with equal strength   PSY: appropriately behaved      Intake and Output:  Current Shift:  03/12 0701 - 03/12 1900  In: 480 [P.O.:480]  Out: -   Last three shifts:  03/10 1901 - 03/12 0700  In: 792.8 [I.V.:792.8]  Out: 1500 [Urine:1500]    Recent Results (from the past 24 hour(s))   GLUCOSE, POC    Collection Time: 03/11/18  5:25 PM   Result Value Ref Range    Glucose (POC) 168 (H) 70 - 110 mg/dL   GLUCOSE, POC    Collection Time: 03/11/18  9:04 PM   Result Value Ref Range    Glucose (POC) 173 (H) 70 - 110 mg/dL   GLUCOSE, POC    Collection Time: 03/12/18  7:48 AM   Result Value Ref Range    Glucose (POC) 145 (H) 70 - 110 mg/dL   GLUCOSE, POC    Collection Time: 03/12/18 11:52 AM   Result Value Ref Range    Glucose (POC) 173 (H) 70 - 110 mg/dL           Lab Results   Component Value Date/Time    Glucose 158 (H) 03/11/2018 06:25 AM    Glucose 138 (H) 03/09/2018 10:25 AM    Glucose 129 (H) 03/08/2018 04:30 AM    Glucose 99 03/07/2018 04:00 AM    Glucose 117 (H) 03/06/2018 06:20 PM        Assessment/Plan:     Patient Active Problem List   Diagnosis Code    [facility] E11.9    Essential hypertension, benign I10    Class 2 obesity due to excess calories with serious comorbidity and body mass index (BMI) of 38.0 to 38.9 in adult E66.09, Z68.38    Lymphoma in remission ([facility] C85.90    Advance care planning Z71.89    CVA (cerebral vascular accident) ([facility] I63.9    Hemorrhage of brain, nontraumatic ([facility] I61.9    Dementia associated with other underlying disease F02.80    Debility R53.81    Other constipation K59.09       Plan  Hyperdense hemorrhage Left lateral frontal lobe  - Neurology signed off  - Continue decadron     Dementia   - Aricept     HTN  - Enalapril   - Amlodipine    DM-II  - SSI  - Diabetic diet    Constipation  - Miralax  - Pericolace     DVT prophylaxis  - SCDs       Dispo: SNF pending insurance auth      SADI Alarcon-TERE [address]  Pager:  [phone]  Office:  [phone]

## 2018-03-12 NOTE — PROGRESS NOTES
NUTRITION follow-up/Plan of care    RECOMMENDATIONS:   1. Consistent CHO  2. Monitor weight and PO intake  3. RD to follow    GOALS:   1. Ongoing: PO intake meets >75% of protein/calorie needs by 3/19  2. Ongoing: Maintain weight (+/- 1-2 lb) by 3/19    ASSESSMENT:   Wt status is classified as obese per BMI of 36. Adequate PO intake per vitals. Labs noted. BG range (135-173) over the past 24 hours. Nutrition recommendations listed. RD to follow. Nutrition Risk:  []   High []  Moderate [x] Low    SUBJECTIVE/OBJECTIVE:   Pt  admitted after having N/V and found to have a left frontal hemorrhage. 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 Micah Montesinos with chemotherapy and XRT).  Wt has been stable since July 2017. NKFA, stable wt and good appetite PTA. Pt had 3 episodes of N/V since last night; zofran given. Reports having good appetite and consuming % of meals usually. Awaiting placement. Will monitor. Information Obtained From:   [x] Chart Review  [x] Patient  [] Family/Caregiver  [x] Nurse/Physician   [] Patient Rounds/Interdisciplinary Meeting    Diet: Consistent CHO  Patient Vitals for the past 100 hrs:   % Diet Eaten   03/12/18 0930 80 %   03/10/18 1744 100 %   03/10/18 1435 50 %   03/09/18 1807 80 %   03/09/18 1400 20 %     Medications: [x] Reviewed   IV: D5 NS @67 mL/hr  Decadron 2 mg Q12  Encounter Diagnoses     ICD-10-CM ICD-9-CM   1. Non-intractable vomiting with nausea, unspecified vomiting type R11.2 787.01   2. Intraparenchymal hematoma of brain, left, without loss of consciousness, initial encounter [facility] S06.350A 853.01   3. Advance care planning Z71.89 V65.49   4. Debility R53.81 799.3   5.  Dementia associated with other underlying disease without behavioral disturbance F02.80 294.10     Past Medical History:   Diagnosis Date    Ankle fracture, right     Diabetes ([facility]     Hypertension     Lymphoma [facility] ([facility]     2 spots in brain    Psychiatric disorder     Depression     Labs:  Lab Results   Component Value Date/Time    Sodium 138 03/11/2018 06:25 AM    Potassium 3.7 03/11/2018 06:25 AM    Chloride 104 03/11/2018 06:25 AM    CO2 27 03/11/2018 06:25 AM    Anion gap 7 03/11/2018 06:25 AM    Glucose 158 (H) 03/11/2018 06:25 AM    BUN 19 (H) 03/11/2018 06:25 AM    Creatinine 0.64 03/11/2018 06:25 AM    Calcium 9.0 03/11/2018 06:25 AM    Magnesium 1.9 03/11/2018 02:52 PM    Phosphorus 3.4 03/11/2018 02:52 PM    Albumin 3.3 (L) 03/11/2018 06:25 AM     Anthropometrics: BMI (calculated): 36 Last 3 Recorded Weights in this Encounter    03/06/18 0908 03/09/18 0656   Weight: 89.4 kg (197 lb) 89.3 kg (196 lb 13.9 oz)    Ht Readings from Last 1 Encounters:   03/06/18 5' 2\" (1.575 m)     []  Weight Loss  []  Weight Gain  [x]  Weight Stable   []  New wt n/a on record     Estimated Nutrition Needs:   1657 Kcals/day  Protein (g): 60 g    Nutrition Problems Identified:   [] Suboptimal PO intake   [] Food Allergies  [] Difficulty chewing/swallowing/poor dentition  [] Constipation/Diarrhea   [x] Nausea/Vomiting x 3 since last night   [] None  [] Other:     Plan:   [x] Therapeutic Diet  []  Obtained/adjusted food preferences/tolerances and/or snacks options   []  Supplements added   [] Occupational therapy following for feeding techniques  []  HS snack added   []  Modify diet texture   []  Modify diet for food allergies   []  Educate patient   []  Assist with menu selection   [x]  Monitor PO intake on meal rounds   [x]  Continue inpatient monitoring and intervention   []  Participated in discharge planning/Interdisciplinary rounds/Team meetings   []  Other:     Education Needs:   [x] Not appropriate for teaching at this time    [] Identified and addressed    Nutrition Monitoring and Evaluation:   [x] Continue inpatient monitoring and interventions    [] Other:     Agueda Alvarez

## 2018-03-12 NOTE — PROGRESS NOTES
Problem: Falls - Risk of  Goal: *Absence of Falls  Document Anjum Fall Risk and appropriate interventions in the flowsheet.    Outcome: Progressing Towards Goal  Fall Risk Interventions:  Mobility Interventions: Communicate number of staff needed for ambulation/transfer         Medication Interventions: Patient to call before getting OOB    Elimination Interventions: Call light in reach, Patient to call for help with toileting needs    History of Falls Interventions: Door open when patient unattended

## 2018-03-12 NOTE — DISCHARGE INSTRUCTIONS
DISCHARGE SUMMARY from Nurse    PATIENT INSTRUCTIONS:    After general anesthesia or intravenous sedation, for 24 hours or while taking prescription Narcotics:  · Limit your activities  · Do not drive and operate hazardous machinery  · Do not make important personal or business decisions  · Do  not drink alcoholic beverages  · If you have not urinated within 8 hours after discharge, please contact your surgeon on call. Report the following to your surgeon:  · Excessive pain, swelling, redness or odor of or around the surgical area  · Temperature over 100.5  · Nausea and vomiting lasting longer than 4 hours or if unable to take medications  · Any signs of decreased circulation or nerve impairment to extremity: change in color, persistent  numbness, tingling, coldness or increase pain  · Any questions    What to do at Home:  Recommended activity: Activity as tolerated    If you experience any of the following symptoms as listed in the discharge instructions as \"When to Call for Help\", please follow up with your primary care physician and/or call 911. *  Please give a list of your current medications to your Primary Care Provider. *  Please update this list whenever your medications are discontinued, doses are      changed, or new medications (including over-the-counter products) are added. *  Please carry medication information at all times in case of emergency situations. These are general instructions for a healthy lifestyle:    No smoking/ No tobacco products/ Avoid exposure to second hand smoke  Surgeon General's Warning:  Quitting smoking now greatly reduces serious risk to your health.     Obesity, smoking, and sedentary lifestyle greatly increases your risk for illness    A healthy diet, regular physical exercise & weight monitoring are important for maintaining a healthy lifestyle    You may be retaining fluid if you have a history of heart failure or if you experience any of the following symptoms:  Weight gain of 3 pounds or more overnight or 5 pounds in a week, increased swelling in our hands or feet or shortness of breath while lying flat in bed. Please call your doctor as soon as you notice any of these symptoms; do not wait until your next office visit. Recognize signs and symptoms of STROKE:    F-face looks uneven    A-arms unable to move or move unevenly    S-speech slurred or non-existent    T-time-call 911 as soon as signs and symptoms begin-DO NOT go       Back to bed or wait to see if you get better-TIME IS BRAIN. Warning Signs of HEART ATTACK     Call 911 if you have these symptoms:   Chest discomfort. Most heart attacks involve discomfort in the center of the chest that lasts more than a few minutes, or that goes away and comes back. It can feel like uncomfortable pressure, squeezing, fullness, or pain.  Discomfort in other areas of the upper body. Symptoms can include pain or discomfort in one or both arms, the back, neck, jaw, or stomach.  Shortness of breath with or without chest discomfort.  Other signs may include breaking out in a cold sweat, nausea, or lightheadedness. Don't wait more than five minutes to call 911 - MINUTES MATTER! Fast action can save your life. Calling 911 is almost always the fastest way to get lifesaving treatment. Emergency Medical Services staff can begin treatment when they arrive -- up to an hour sooner than if someone gets to the hospital by car. The discharge information has been reviewed with the patient. The patient verbalized understanding. Discharge medications reviewed with the patient and appropriate educational materials and side effects teaching were provided. ___________________________________________________________________________________________________________________________________           Learning About How to Prevent a Stroke  What is a stroke?   A stroke occurs when a blood vessel in the brain bursts or is blocked by a blood clot. Without blood and the oxygen it carries, part of the brain starts to die. The part of the body controlled by the damaged area of the brain can't work properly. But there are many things you can do to help lower your stroke risk. What increases your risk for stroke? A risk factor is anything that makes you more likely to have a particular health problem. Risk factors for stroke that you can treat or change include:  · Health problems like high blood pressure, atrial fibrillation, diabetes, and high cholesterol. · Smoking. · Heavy use of alcohol. · Being overweight. · Not getting enough physical activity. Risk factors you can't change include:  · Age. The risk of stroke goes up as you get older. · Race.  Americans, Native Americans, and [place] Natives have a higher risk than those of other races. · Being female. Women have a higher risk of stroke than men. · Family history of stroke. Your doctor can help you know your risk. Then you and your can doctor talk about whether you need to lower it. What can you do to prevent a stroke? · Treat any health problems you have that raise your risk. · Adopt a heart-healthy lifestyle:  ¨ Don't smoke. If you need help quitting, talk to your doctor about stop-smoking programs and medicines. These can increase your chances of quitting for good. ¨ Limit alcohol to 2 drinks a day for men and 1 drink a day for women. ¨ Stay at a healthy weight. Lose weight if you need to. ¨ If your doctor recommends it, get more exercise. Walking is a good choice. Bit by bit, increase the amount you walk every day. Try for at least 30 minutes on most days of the week. ¨ Eat heart-healthy foods. These include fruits, vegetables, high-fiber foods, and fish. Choose foods that are low in sodium, saturated fat, and trans fat. · Decide with your doctor whether you will also take medicines to help lower your risk.  For example, you and your doctor may decide you will take a medicine that prevents blood clots. What are the symptoms of a stroke? The brain damage from a stroke starts within minutes. That's why it's so important to know the symptoms of stroke and to act fast. Quick treatment can help limit damage to the brain so that you have fewer problems after the stroke. FAST is a simple way to remember the main symptoms of stroke. Recognizing these symptoms helps you know when to call for medical help. FAST stands for:  · Face drooping. · Arm weakness. · Speech difficulty. · Time to call 911. Follow-up care is a key part of your treatment and safety. Be sure to make and go to all appointments, and call your doctor if you are having problems. It's also a good idea to know your test results and keep a list of the medicines you take. Where can you learn more? Go to http://elizabeth-demetris.info/. Enter G757 in the search box to learn more about \"Learning About How to Prevent a Stroke. \"  Current as of: March 20, 2017  Content Version: 11.4  © 5958-0814 [employer]. Care instructions adapted under license by [employer] (which disclaims liability or warranty for this information). If you have questions about a medical condition or this instruction, always ask your healthcare professional. [address] any warranty or liability for your use of this information. Learning About How to Prevent Another Stroke  What can you do to prevent another stroke? After a stroke, people feel lots of different emotions. Some people are worried that they could have another stroke. Or they may feel overwhelmed by how much there is to learn and do. Some people feel sad or depressed. No matter what emotions you are feeling, you can give yourself some control and peace of mind by making a plan to lower your risk of having another stroke.   Take your medicines  You'll need to take medicines to help prevent another stroke. Be sure to take your medicines exactly as prescribed. And don't stop taking them unless your doctor tells you to. If you stop taking your medicines, you can increase your risk of having another stroke. Some of the medicines your doctor may prescribe include:  · Aspirin or some other blood thinner to prevent blood clots. · Statins to lower cholesterol. · Blood pressure medicines to lower blood pressure. Manage other health problems  You can help lower your chance of having another stroke by managing certain other health problems. Problems that increase your risk of having another stroke include:  · High blood pressure. · High cholesterol. · Atrial fibrillation. · Diabetes. If you have any of these health problems, you can manage them with healthy lifestyle changes along with medicine. Adopt a healthy lifestyle  · Do not smoke or allow others to smoke around you. If you need help quitting, talk to your doctor about stop-smoking programs and medicines. These can increase your chances of quitting for good. Smoking makes a stroke more likely. · Limit alcohol to 2 drinks a day for men and 1 drink a day for women. · Lose weight if you need to. Controlling your weight will help you keep your heart and body healthy. · Be active. Ask your doctor what type and level of activity is safe for you. · Eat heart-healthy foods, like fruits, vegetables, and high-fiber foods. It's also important to:  · Get a flu shot every year. · Ask for help if you think you are depressed. Do stroke rehab  Taking part in a stroke rehabilitation (rehab) program will help you to regain skills you lost or make the most of your abilities after a stroke. It also helps you take steps to prevent another stroke. Your rehab team will give you education and support to help you build new, healthy habits. You'll learn how to manage any other health problems that you might have.  Fortunato Fitzgerald also learn how to exercise safely, eat a healthy diet, and quit smoking if you smoke. Skye Lebron work with your team to decide what lifestyle choices are best for you. If your doctor hasn't already suggested it, ask him or her if stroke rehab is right for you. Know stroke symptoms  Make sure you know the symptoms of stroke. FAST is a simple way to remember. Recognizing these symptoms helps you know when to call for medical help. FAST stands for:  · Face drooping. · Arm weakness. · Speech difficulty. · Time to call 911. Follow-up care is a key part of your treatment and safety. Be sure to make and go to all appointments, and call your doctor if you are having problems. It's also a good idea to know your test results and keep a list of the medicines you take. Where can you learn more? Go to http://elizabeth-demetris.info/. Enter B825 in the search box to learn more about \"Learning About How to Prevent Another Stroke. \"  Current as of: March 20, 2017  Content Version: 11.4  © 7974-1590 [employer]. Care instructions adapted under license by [employer] (which disclaims liability or warranty for this information). If you have questions about a medical condition or this instruction, always ask your healthcare professional. [place] [address] any warranty or liability for your use of this information. Patient discharged without removing armband and transfered to another healthcare acute, sub acute , or extended care facility. Informed of privacy risks if armband lost or stolen.

## 2018-03-12 NOTE — PROGRESS NOTES
SANCHO lubin copied and placed in envelope. And Uploaded in [place]. I called Vandana Yuan at [email or web address] and told her also.

## 2018-03-12 NOTE — ROUTINE PROCESS
Bedside / verbal shift change report given to [address] [place] [address] Road   (oncoming nurse) by Enrique Conroy RN (offgoing nurse). Report included the following information SBAR, Kardex, Intake/Output, MAR and Recent Results.  Dual NIh done with oncoming Nurse

## 2018-03-12 NOTE — PROGRESS NOTES
Pt has [employer] gold, no auth required for stretcher transport. spoke with Mora Enriquez there, ref# [id number]. Transport set with [facility] to [facility] for 530 pm pcs completed envelope in nurses station. paged dr Trey Zambrano, notified pts dtr. mrs tsai.

## 2018-03-12 NOTE — PROGRESS NOTES
Problem: Falls - Risk of  Goal: *Absence of Falls  Document Anjum Fall Risk and appropriate interventions in the flowsheet.    Outcome: Progressing Towards Goal  Fall Risk Interventions:  Mobility Interventions: Bed/chair exit alarm, Communicate number of staff needed for ambulation/transfer, Patient to call before getting OOB         Medication Interventions: Patient to call before getting OOB, Assess postural VS orthostatic hypotension    Elimination Interventions: Call light in reach    History of Falls Interventions: Door open when patient unattended

## 2018-03-12 NOTE — PROGRESS NOTES
[facility]: [phone]  [facility]ly [facility]: [address] [place] [address] Way: [phone]    Patient Name: Jeimy Giraldo  YOB: 1950    Date of Initial Consult: 3/7/2018   Reason for Consult: care decisions  Requesting Provider: Betsy Ernandez   Primary Care Physician: Barney Zambrano MD      SUMMARY:   Jeimy Giraldo is a 75y.o. year old with a past history of diabetes, hypertension, lymphoma s/p chemo and radiation , dementia, who was admitted on 3/6/2018 from home with a diagnosis of hemorrhage with the lateral left frontal lobe with possible recurrent lymphoma . Current medical issues leading to Palliative Medicine involvement include: 75year old female with brain hemorrhage possible recurrent lymphoma and dementia. Palliative medicine is consulted to discuss care options. PALLIATIVE DIAGNOSES:   1. Advanced care plan discussions   2. Brain hemorrhage     3. Dementia     4. Debility        PLAN:   1. Ms. Josef Gustafson was seen today as follow up along with Ms. Jacobo Huynh, NP. Sitting up in bed, beginning to work with OT. Alert and oriented x 2, however becomes distracted easily. 2. Advanced care plan discussions Eulah May ( daughter is LISA). Goals of care DNR/DNI. DDNR has been signed. NO escalation of care, no ICU return no pressor support in the event of decline. POST has been signed for dnr/dni, comfort measures on d/c, no feeding tubes ever. Plan at d/c is therapy at SNF once complete transition to LTC with hospice support. 3. Brain hemorrhage/ possible recurrent lymphoma  alert verbal, speech and processing slow. No vomiting so far today. Tells me she has nausea but currently appears comfortable , on Decadron. Would send to SNF with Zofran ODT Seen by oncology not a candidate for chemo, patient and family have elected not to pursue RT. 4. Constipation unsure when last stool was, will add miralax to [facility], denies feeling she is constipated. 5. Dementia formally dx by neurology, some could be secondary to previous RT. Family has seen a decline in function. 6. Debility had several falls, uses walker for ambulation. Needs assistance with ADL's and IADL's. Incontinent of urine. No longer safe to be at home alone. Family works. Participating in therapy and will go to nursing facility with therapy, once complete long term care with hospice support. Working with OT / PT   7. Initial consult note routed to primary continuity provider  8. Communicated plan of care with: Palliative IDT    GOALS OF CARE:  Patient/Health Care Proxy Stated Goals: Comfort      TREATMENT PREFERENCES:   Code Status: DNR    Advance Care Planning:  Advance Care Planning 3/6/2018   Patient's Healthcare Decision Maker is: Legal Next of Valeria Valdovinos   Primary Decision Maker Name Johann Bush   Primary Decision Maker Phone Number [phone]   Confirm Advance Directive Yes, on file   Does the patient have other document types Power of            Other Instructions:   Artificially Administered Nutrition: No feeding tube     Other:  As far as possible, the palliative care team has discussed with patient / health care proxy about goals of care / treatment preferences for patient. HISTORY:     History obtained from: chart and daughter     CHIEF COMPLAINT: vomiting     HPI/SUBJECTIVE:    The patient is:   [] Verbal and participatory  [x] Non-participatory due to: slow mental processing,   75year old female presented with vomiting, found to have brain hemorrhage concern for recurrent lymphoma. Has been seen by oncology not a candidate for chemotherapy, radiation oncology has been consulted. Patient also has dementia. She is currently comfortable, not vomiting. Family at bedside, goals of care and care discussions discussed.     Clinical Pain Assessment (nonverbal scale for nonverbal patients): Clinical Pain Assessment  Severity: 0  Location: abdomen  Duration: several days  Effect: minimal discomfort   Frequency: occasionally          Duration: for how long has pt been experiencing pain (e.g., 2 days, 1 month, years)  Frequency: how often pain is an issue (e.g., several times per day, once every few days, constant)     FUNCTIONAL ASSESSMENT:     Palliative Performance Scale (PPS):  PPS: 30    ECOG  ECOG Status : Completely disabled     PSYCHOSOCIAL/SPIRITUAL SCREENING:      Any spiritual / Pentecostalism concerns:  [] Yes /  [x] No    Caregiver Burnout:  [] Yes /  [x] No /  [] No Caregiver Present      Anticipatory grief assessment:   [x] Normal  / [] Maladaptive        REVIEW OF SYSTEMS:     Positive and pertinent negative findings in ROS are noted above in HPI. The following systems were [] reviewed / [x] unable to be reviewed as noted in HPI  Other findings are noted below. Systems: constitutional, ears/nose/mouth/throat, respiratory, gastrointestinal, genitourinary, musculoskeletal, integumentary, neurologic, psychiatric, endocrine. Positive findings noted below. Modified ESAS Completed by: provider   Fatigue: 5 Drowsiness: 0     Pain: 0   Anxiety: 0 Nausea: 3   Anorexia: 3 Dyspnea: 0     Constipation: Yes              PHYSICAL EXAM:     Wt Readings from Last 3 Encounters:   03/09/18 89.3 kg (196 lb 13.9 oz)   03/06/18 88 kg (194 lb)   02/26/18 88.3 kg (194 lb 9.6 oz)     Blood pressure 162/83, pulse 60, temperature 98 °F (36.7 °C), resp. rate 15, height 5' 2\" (1.575 m), weight 89.3 kg (196 lb 13.9 oz), SpO2 96 %, not currently breastfeeding. Pain:  Pain Scale 1: Numeric (0 - 10)  Pain Intensity 1: 0                     Constitutional: sitting up in bed, OT present at bedside and beginning to assist with teeth brushing.  In NAD  Eyes: pupils equal, anicteric  Respiratory: breathing not labored  Skin: warm, dry  Neurologic: alert oriented x 2 speech slow, processes information slow         HISTORY:     Active Problems:    CVA (cerebral vascular accident) ([facility] [address] (3/6/2018)      Hemorrhage of brain, nontraumatic ([facility] [address] (3/7/2018)      Dementia associated with other underlying disease (3/7/2018)      Debility (3/7/2018)      Past Medical History:   Diagnosis Date    Ankle fracture, right     Diabetes ([facility] [address]     Hypertension     Lymphoma in remission ([facility] [address]     2 spots in brain    Psychiatric disorder     Depression      Past Surgical History:   Procedure Laterality Date    HX HYSTERECTOMY        History reviewed. No pertinent family history. History reviewed, no pertinent family history.   Social History   Substance Use Topics    Smoking status: Former Smoker    Smokeless tobacco: Never Used    Alcohol use No     Allergies   Allergen Reactions    Latex, Natural Rubber Other (comments)     Swelling in hands and lips    Iodine Anaphylaxis and Swelling      Current Facility-Administered Medications   Medication Dose Route Frequency    folic acid (FOLVITE) tablet 1 mg  1 mg Oral DAILY    Thiamine Mononitrate (B-1) tablet 100 mg  100 mg Oral DAILY    amLODIPine (NORVASC) tablet 10 mg  10 mg Oral DAILY    dexamethasone (DECADRON) tablet 2 mg  2 mg Oral Q12H    citalopram (CELEXA) tablet 20 mg  20 mg Oral DAILY    donepezil (ARICEPT) tablet 10 mg  10 mg Oral QHS    enalapril (VASOTEC) tablet 20 mg  20 mg Oral DAILY    PHARMACY INFORMATION NOTE  1 Each Other DAILY    ondansetron (ZOFRAN) injection 2 mg  2 mg IntraVENous Q6H PRN    labetalol (NORMODYNE;TRANDATE) 20 mg/4 mL (5 mg/mL) injection 5 mg  5 mg IntraVENous Q15MIN PRN    acetaminophen (TYLENOL) tablet 650 mg  650 mg Oral Q4H PRN    acetaminophen (TYLENOL) suppository 650 mg  650 mg Rectal Q4H PRN    senna-docusate (PERICOLACE) 8.6-50 mg per tablet 2 Tab  2 Tab Oral QHS    albuterol (PROVENTIL VENTOLIN) nebulizer solution 2.5 mg  2.5 mg Nebulization Q4H PRN    pantoprazole (PROTONIX) tablet 40 mg  40 mg Oral Q12H    Or    pantoprazole (PROTONIX) granules for oral suspension 40 mg  40 mg Oral Q12H    Or    pantoprazole (PROTONIX) 40 mg in sodium chloride 10 mL injection  40 mg IntraVENous Q12H    insulin lispro (HUMALOG) injection   SubCUTAneous AC&HS    glucose chewable tablet 16 g  16 g Oral PRN    glucagon (GLUCAGEN) injection 1 mg  1 mg IntraMUSCular PRN    dextrose (D50W) injection syrg 12.5-25 g  25-50 mL IntraVENous PRN        LAB AND IMAGING FINDINGS:     Lab Results   Component Value Date/Time    WBC 8.0 03/11/2018 06:25 AM    HGB 12.9 03/11/2018 06:25 AM    PLATELET 719 81/51/8231 06:25 AM     Lab Results   Component Value Date/Time    Sodium 138 03/11/2018 06:25 AM    Potassium 3.7 03/11/2018 06:25 AM    Chloride 104 03/11/2018 06:25 AM    CO2 27 03/11/2018 06:25 AM    BUN 19 (H) 03/11/2018 06:25 AM    Creatinine 0.64 03/11/2018 06:25 AM    Calcium 9.0 03/11/2018 06:25 AM    Magnesium 1.9 03/11/2018 02:52 PM    Phosphorus 3.4 03/11/2018 02:52 PM      Lab Results   Component Value Date/Time    AST (SGOT) 7 (L) 03/11/2018 06:25 AM    Alk. phosphatase 65 03/11/2018 06:25 AM    Protein, total 6.7 03/11/2018 06:25 AM    Albumin 3.3 (L) 03/11/2018 06:25 AM    Globulin 3.4 03/11/2018 06:25 AM     Lab Results   Component Value Date/Time    INR 1.0 03/11/2018 02:52 PM    Prothrombin time 12.9 03/11/2018 02:52 PM    aPTT 25.2 03/11/2018 02:52 PM      No results found for: IRON, FE, TIBC, IBCT, PSAT, FERR   No results found for: PH, PCO2, PO2  No components found for: [place]   Lab Results   Component Value Date/Time    CK 53 03/06/2018 06:20 PM    CK - MB 1.2 03/06/2018 06:20 PM              Total time: 35 minutes  Counseling / coordination time, spent as noted above: 25 minutes  > 50% counseling / coordination: yes with daughter    Prolonged service was provided for  []30 min   []75 min in face to face time in the presence of the patient, spent as noted above. Note: this can only be billed with 88695 (initial) or 62475 (follow up). If multiple start / stop times, list each separately.

## 2018-03-12 NOTE — DISCHARGE SUMMARY
Discharge Summary    Patient: Octavio Lubin               Sex: female          DOA: 3/6/2018         YOB: 1950      Age:  75 y.o.        LOS:  LOS: 6 days                Admit Date: 3/6/2018    Discharge Date: 3/12/2018    Admission Diagnoses: CVA (cerebral vascular accident) [facility])    Discharge Diagnoses:    Problem List as of 3/12/2018  Date Reviewed: 1/12/2018          Codes Class Noted - Resolved    Other constipation ICD-10-CM: K59.09  ICD-9-CM: 564.09  3/12/2018 - Present        Hemorrhage of brain, nontraumatic ([facility] [address] ICD-10-CM: I61.9  ICD-9-CM: 672  3/7/2018 - Present        Dementia associated with other underlying disease ICD-10-CM: F02.80  ICD-9-CM: 294.10  3/7/2018 - Present        Debility ICD-10-CM: R53.81  ICD-9-CM: 799.3  3/7/2018 - Present        CVA (cerebral vascular accident) [facility]) ICD-10-CM: I63.9  ICD-9-CM: 434.91  3/6/2018 - Present        Advance care planning ICD-10-CM: Z71.89  ICD-9-CM: V65.49  7/6/2016 - Present    Overview Signed 7/6/2016  8:18 AM by Dre Pace MD     Medical power of  is her daughter Dayanara Flores. Pt is full code             DM (diabetes mellitus) ([facility] [address] ICD-10-CM: E11.9  ICD-9-CM: 250.00  1/23/2014 - Present        Essential hypertension, benign ICD-10-CM: I10  ICD-9-CM: 401.1  1/23/2014 - Present        Class 2 obesity due to excess calories with serious comorbidity and body mass index (BMI) of 38.0 to 38.9 in adult ICD-10-CM: E66.09, Z68.38  ICD-9-CM: 278.00, V85.38  1/23/2014 - Present        Lymphoma in remission [facility]) ICD-10-CM: C85.90  ICD-9-CM: 202.80  1/23/2014 - Present              Discharge Condition:  Improved    Hospital Course:  Ms Africa Weaver is a 75year old female who has a known history of CNS lymphoma. She has received XRT and Chemotherapy in the past for her previous diagnosis. She was found to have multiple ring enhancing lesions involving her brain on this admission.   Oncology and Neurology were consulted for assistance with management. Ms Lupe Emmanuel has dementia and she is not thought to be a candidate for surgical treatment at this point. Her family met with Palliative Care and they opted to make her DNR and comfort care measures moving forward. She will discharge on Decadron to decrease inflammation and her blood sugar will be addressed with insulin. Her family is clear that they would never want a feeding tube and they do not want escalation of care. She would not be considered a candidate to return to the hospital.  She is appropriate to transfer this evening. Consults: Oncology, Neurology, Palliative Care   Consulting Provider:  Dr Oswaldo Gil MD   Consulting Provider:  Kailash Sevilla MD      Consulting Provider: Mckenzie Vazquez MD      Discharge Medications:     Current Discharge Medication List      START taking these medications    Details   amLODIPine (NORVASC) 10 mg tablet Take 1 Tab by mouth daily. Qty: 30 Tab, Refills: 0      folic acid (FOLVITE) 1 mg tablet Take 1 Tab by mouth daily. Qty: 30 Tab, Refills: 0      Thiamine Mononitrate (B-1) 100 mg tablet Take 1 Tab by mouth daily. Qty: 30 Tab, Refills: 0      dexamethasone (DECADRON) 4 mg tablet Take 0.5 Tabs by mouth two (2) times daily (with meals). Qty: 60 Tab, Refills: 0         CONTINUE these medications which have NOT CHANGED    Details   donepezil (ARICEPT) 10 mg tablet Take 1 Tab by mouth nightly. Qty: 90 Tab, Refills: 1    Associated Diagnoses: Vascular dementia without behavioral disturbance      DILT- mg XR capsule TAKE 1 CAPSULE EVERY DAY  Qty: 90 Cap, Refills: 0    Associated Diagnoses: Essential hypertension with goal blood pressure less than 140/90      levETIRAcetam (KEPPRA XR) 750 mg ER tablet Take 1 Tab by Mouth Every Night at Bedtime. Qty: 90 Tab, Refills: 1    Associated Diagnoses: Seizure ([address]      enalapril (VASOTEC) 20 mg tablet Take 1 Tab by mouth daily.   Qty: 90 Tab, Refills: 3    Comments: Cancel 10 mg order  Associated Diagnoses: Essential hypertension with goal blood pressure less than 140/90      NOVOLOG FLEXPEN 100 unit/mL inpn INJECT  30 UNITS SUBCUTANEOUSLY IN THE MORNING  AND  20 UNITS AT NIGHT FOR DIABETES  Qty: 45 mL, Refills: 1    Associated Diagnoses: Hyperglycemia due to type 2 diabetes mellitus (HCC)      citalopram (CELEXA) 20 mg tablet Take 1 Tab by mouth daily. Indications: major depressive disorder  Qty: 30 Tab, Refills: 11    Associated Diagnoses: Moderate episode of recurrent major depressive disorder (HCC)      ondansetron (ZOFRAN ODT) 4 mg disintegrating tablet Take 1 Tab by mouth every eight (8) hours as needed for Nausea.   Qty: 20 Tab, Refills: 0         STOP taking these medications       VITAMIN D2 50,000 unit capsule Comments:   Reason for Stopping:         aspirin delayed-release 81 mg tablet Comments:   Reason for Stopping:         canagliflozin (INVOKANA) 100 mg tablet Comments:   Reason for Stopping:         DUREZOL 0.05 % ophthalmic emulsion Comments:   Reason for Stopping:         ILEVRO 0.3 % drps Comments:   Reason for Stopping:         IBUPROFEN/DIPHENHYDRAMINE CIT (ADVIL PM PO) Comments:   Reason for Stopping:               Activity: Activity as tolerated    Diet: Diabetic Diet    Wound Care: None needed    Follow-up: Follow up with staff MD on arrival.

## 2018-03-12 NOTE — PROGRESS NOTES
Problem: Self Care Deficits Care Plan (Adult)  Goal: *Acute Goals and Plan of Care (Insert Text)  Occupational Therapy Goals  Initiated 3/7/2018 within 7 day(s). Pt re-evaluated on 3/9/2018 due to OOB orders. Updated goals listed below. Bedrest Goals  1. Patient will perform grooming tasks with supervision/set-up. 2.  Patient will perform functional task for 8 minutes with minimal assistance to incorporate LUE to increase coordination and control. 3.  Patient will perform upper body dressing with supervision/set-up. 4.  Patient will participate in upper extremity therapeutic exercise/activities with supervision/set-up for 8 minutes to increase LUE strength/control for functional transfers and ADLs. 5.  Patient will utilize energy conservation techniques during functional activities with minimal verbal cues. Updated Goals (Julia Yang MS, OTR/L)  1. Patient will perform grooming tasks at EOB with minimal assistance to incorporate LUE to increase coordination. .  2.  Patient will perform upper body dressing with supervision for balance. 3.  Patient will perform functional task at EOB for 8 minutes with supervision for balance to increase activity tolerance for ADLs. 4.  Patient will perform toilet transfers with supervision/set-up. 5.  Patient will perform all aspects of toileting with supervision/set-up. 6.  Patient will participate in upper extremity therapeutic exercise/activities with supervision/set-up for 8 minutes to maintain  BUE strength for ADLs. 7.  Patient will utilize energy conservation techniques during functional activities with minimal verbal cues. Occupational Therapy TREATMENT    Patient: Karen Workman (89 y.o. female)  Date: 3/12/2018  Diagnosis: CVA (cerebral vascular accident) [facility]) <principal problem not specified>       Precautions: Fall, Seizure  Chart, occupational therapy assessment, plan of care, and goals were reviewed.   PLOF: Pt required assistance with ADLs PTA.    ASSESSMENT:  Pt presented supine in bed with HOB raised agreeable to skilled OT services this date. Pt maneuvered to seated EOB Min/Mod A to perform grooming tasks. Pt combed hair SBA requiring cuing for thoroughness. Pt performed oral hygiene and washing face Mod I after retrieval of items. Pt incorporated LUE during ADL tasks. Pt requested to lay back down. Pt moved to supine in bed Min/Mod A. Pt scooted towards [facility] Min A requiring verbal cues for hand placement. Pt threw up after returning to supine. Nursing called for assistance. After nausea had passed, pt provided clean hospital gown. Pt donned hospital gown supervision/setup. Pt was left comfortable in bed and call bell within reach. Nursing present in room. EDUCATION Pt educated on proper bed mobility techniques. Progression toward goals:  [x]          Improving appropriately and progressing toward goals  []          Improving slowly and progressing toward goals  []          Not making progress toward goals and plan of care will be adjusted     PLAN:  Patient continues to benefit from skilled intervention to address the above impairments. Continue treatment per established plan of care. Discharge Recommendations:  Skilled Nursing Facility  Further Equipment Recommendations for Discharge:  TBD     SUBJECTIVE:   Patient stated Sure.  When asked if she would like to sit EOB. OBJECTIVE DATA SUMMARY:     G CODES:  Self Care  Current  CL= 60-79%. The severity rating is based on the Other functional assessment    Cognitive/Behavioral Status:  Neurologic State: Alert  Orientation Level: Oriented to person, Oriented to place, Oriented to time  Cognition: Follows commands  Safety/Judgement: Awareness of environment, Fall prevention  Functional Mobility and Transfers for ADLs:   Bed Mobility:     Supine to Sit: Minimum assistance; Moderate assistance  Sit to Supine: Minimum assistance; Moderate assistance  Scooting: Minimum assistance   Transfers:  Sit to Stand: Minimum assistance; Moderate assistance; Additional time;Assist x1     Bed to Chair: Minimum assistance; Moderate assistance; Additional time;Assist x1     Balance:  Sitting - Static: Good (unsupported)  Sitting - Dynamic: Fair (occasional)  Standing - Static: Fair  Standing - Dynamic : Poor  ADL Intervention:       Grooming  Washing Face: Modified independent (seated EOB)  Brushing Teeth: Modified independent (seated EOB)  Brushing/Combing Hair: Stand-by assistance (seated EOB)    Upper Body Dressing Assistance  Hospital Gown: Supervision/ set-up (supine in bed with HOB raised)      Pain:0/10  Pre Treatment:  Post Treatment:  Pain Scale 1: Numeric (0 - 10)  Pain Intensity 1: 0    Activity Tolerance:    Fair  Please refer to the flowsheet for vital signs taken during this treatment.   After treatment:   []  Patient left in no apparent distress sitting up in chair  [x]  Patient left in no apparent distress in bed  [x]  Call bell left within reach  [x]  Nursing notified  []  Caregiver present  []  Bed alarm activated    HEATHER Sparks  Time Calculation: 31 mins

## 2018-03-12 NOTE — INTERDISCIPLINARY ROUNDS
IDR Summary      Patient: Xi Fisher MRN: [id number]    Age: 75 y.o.  : 1950     Admit Diagnosis: CVA (cerebral vascular accident) ([address]        DIET status: Cardiac consistent carb     Lines/Tubes:   IV: YES   Needed: YES  Thorpe: NO  Needed:NO  Central Line: NO Needed: NO      VTE Prophylaxis: Mechanical    Mobility needs: Yes     PT ordered:  YES PT eval on chart: YES    OT ordered:  YES OT eval on chart: YES      ST ordered:  NO ST eval on chart:  NO     Disposition/Care Management:  Discharge plan: SNF      Home Health ordered? NO     Recommended DME from PT/OT:      DME ordered? NO     SNF- has patient been matched? YES    Accepting bed? YES   Does patient require insurance auth?   YES        Barriers to discharge:   Financial concerns:No   PCP: Delia Torres MD    : NO  Interventions:       LOS: 6 days     Expected days until discharge: 1-2 days pending insurance auth- has been started            Signed:     Renetta Perdue, FNP-BC  [address]  Hospitalist Division  Pager:  [phone]  Office:  [phone]

## 2018-03-12 NOTE — PROGRESS NOTES
Hematology/Oncology Chart Note  Betty Vieyra  2109/01      Continue low-dose dexamethasone  Plan is SNF-hospice    Recent Results (from the past 12 hour(s))   GLUCOSE, POC    Collection Time: 03/11/18  9:04 PM   Result Value Ref Range    Glucose (POC) 173 (H) 70 - 110 mg/dL         Deanne Blackmon MD  [facility] [phone]  Cell [phone]

## 2018-03-14 NOTE — PROGRESS NOTES
Nurse Gonzalez 10 Follow Up Note  -03/06/18 Admitted at [facility] for CVA  -03/12/18 Discharged to [address] [place] [address]   -03/14/18 NN contact      Transition of Care Coordination/Hospital to SNF        Patient was admitted to [facility] on 03/06/18 and discharged on 03/12/18 for CVA. Patient was transferred to [address] [place] [address] for continuation of care. Course of hospitalization: Per DC summary  Dr. Javed Wiley 03/12/18  Hospital Course:  Ms Mari Escobedo is a 75year old female who has a known history of CNS lymphoma. She has received XRT and Chemotherapy in the past for her previous diagnosis. She was found to have multiple ring enhancing lesions involving her brain on this admission. Oncology and Neurology were consulted for assistance with management. Ms Mari Escobedo has dementia and she is not thought to be a candidate for surgical treatment at this point. Her family met with Palliative Care and they opted to make her DNR and comfort care measures moving forward. She will discharge on Decadron to decrease inflammation and her blood sugar will be addressed with insulin. Her family is clear that they would never want a feeding tube and they do not want escalation of care. She would not be considered a candidate to return to the hospital.  She is appropriate to transfer this evening. Pertinent labs/imaging:   Results for Maday Thomson (MRN [id number]) as of 3/14/2018 17:06   Ref.  Range 3/12/2018 15:30 3/12/2018 16:25   GLUCOSE,FAST - POC Latest Ref Range: 70 - 110 mg/dL  185 (H)   Ionized Calcium Latest Ref Range: 1.12 - 1.32 MMOL/L 1.15    INR Latest Ref Range: 0.8 - 1.2   1.1    Prothrombin time Latest Ref Range: 11.5 - 15.2 sec 13.5    aPTT Latest Ref Range: 23.0 - 36.4 SEC 24.8    Phosphorus Latest Ref Range: 2.5 - 4.9 MG/DL 3.4    Magnesium Latest Ref Range: 1.6 - 2.6 mg/dL 1.9    Bilirubin, total Latest Ref Range: 0.2 - 1.0 MG/DL 0.3    Bilirubin, direct Latest Ref Range: 0.0 - 0.2 MG/DL 0.1    Protein, total Latest Ref Range: 6.4 - 8.2 g/dL 6.9    Albumin Latest Ref Range: 3.4 - 5.0 g/dL 3.4    Globulin Latest Ref Range: 2.0 - 4.0 g/dL 3.5    A-G Ratio Latest Ref Range: 0.8 - 1.7   1.0    ALT (SGPT) Latest Ref Range: 13 - 56 U/L 16    AST Latest Ref Range: 15 - 37 U/L 4 (L)    Alk. phosphatase Latest Ref Range: 45 - 117 U/L 63    Lipase Latest Ref Range: 73 - 393 U/L 72 (L)    Ammonia Latest Ref Range: 11 - 32 UMOL/L 15    Crossmatch Expiration Unknown 03/15/2018    TYPE & SCREEN Unknown Rpt      Inpatient Consults:  Palliative- Dr. Oral Cowden  Hem/Onc-Dr. Montemayor Erps Dr. Roshni Gibson        Discharge diagnoses:   Discharge Diagnoses:    Problem List as of 3/12/2018  Date Reviewed: 1/12/2018             Codes Class Noted - Resolved     Other constipation ICD-10-CM: K59.09  ICD-9-CM: 564.09   3/12/2018 - Present           Hemorrhage of brain, nontraumatic ([facility] [address] ICD-10-CM: I61.9  ICD-9-CM: 410   3/7/2018 - Present           Dementia associated with other underlying disease ICD-10-CM: F02.80  ICD-9-CM: 294.10   3/7/2018 - Present           Debility ICD-10-CM: R53.81  ICD-9-CM: 419. 3   3/7/2018 - Present           CVA (cerebral vascular accident) [facility]) ICD-10-CM: I63.9  ICD-9-CM: 434.91   3/6/2018 - Present           Advance care planning ICD-10-CM: Z71.89  ICD-9-CM: V65.49   7/6/2016 - Present     Overview Signed 7/6/2016  8:18 AM by Hiro Lees MD       Medical power of  is her daughter Maribeth Steward. Pt is full code               DM (diabetes mellitus) ([facility] [address] ICD-10-CM: E11.9  ICD-9-CM: 250.00   1/23/2014 - Present           Essential hypertension, benign ICD-10-CM: I10  ICD-9-CM: 739. 1   1/23/2014 - Present           Class 2 obesity due to excess calories with serious comorbidity and body mass index (BMI) of 38.0 to 38.9 in adult ICD-10-CM: E66.09, Z68.38  ICD-9-CM: 278.00, V85.38   1/23/2014 - Present           Lymphoma in remission ([facility] ICD-10-CM: C85.90  ICD-9-CM: 202.80             Nurse Navigator(NN) contacted [facility] Nicole Bradford ([phone]) to verify admission, review discharge orders and reconcile discharge medications. Spoke to Nurse Dariel Hylton. Provided introduction to self, and explanation of the Nurses Navigator role. Verified name and  as patient identifiers. Admission verified. Reconciled transfer medications and reviewed discharge orders. She is aware patient currently receiving skilled services and once Medicaid approved, patient will be transitioning to LTC for comfort care. She is requesting info regarding Medicaid application and to be faxed to [phone]    Opportunity to ask questions was provided. Contact information was provided for future reference or further questions. Will continue to monitor. Addendum: Emailed Efe Muller with [employer] for update regarding patient's Medicaid Application. Ms. Fry Short response:  \"She still pending with Judeen Pulse  [phone] application pending since October. \" Info faxed to [facility]

## 2018-12-19 NOTE — PROGRESS NOTES
attempted to complete  the initial Spiritual Assessment of the patient in bed 11 of the emergency room with family present at bedside. Patient is in a critical condition and is now unable to communicate at this time. . Patient does not have any Christian/cultural needs that will affect patients preferences in health care.  Chaplains will continue to follow and will provide pastoral care on an as needed/requested basis     Chaplain Mio Nassar   Board Certified [address] [place] [address]   [phone] needed assist
